# Patient Record
Sex: MALE | Race: WHITE | NOT HISPANIC OR LATINO | Employment: OTHER | URBAN - METROPOLITAN AREA
[De-identification: names, ages, dates, MRNs, and addresses within clinical notes are randomized per-mention and may not be internally consistent; named-entity substitution may affect disease eponyms.]

---

## 2024-11-19 ENCOUNTER — APPOINTMENT (OUTPATIENT)
Dept: RADIOLOGY | Facility: HOSPITAL | Age: 69
DRG: 177 | End: 2024-11-19
Payer: MEDICARE

## 2024-11-19 ENCOUNTER — APPOINTMENT (OUTPATIENT)
Dept: CARDIOLOGY | Facility: HOSPITAL | Age: 69
DRG: 177 | End: 2024-11-19
Payer: MEDICARE

## 2024-11-19 ENCOUNTER — HOSPITAL ENCOUNTER (INPATIENT)
Facility: HOSPITAL | Age: 69
DRG: 177 | End: 2024-11-19
Attending: EMERGENCY MEDICINE | Admitting: STUDENT IN AN ORGANIZED HEALTH CARE EDUCATION/TRAINING PROGRAM
Payer: MEDICARE

## 2024-11-19 DIAGNOSIS — I21.4 NSTEMI (NON-ST ELEVATED MYOCARDIAL INFARCTION) (MULTI): ICD-10-CM

## 2024-11-19 DIAGNOSIS — R41.82 ALTERED MENTAL STATUS, UNSPECIFIED ALTERED MENTAL STATUS TYPE: ICD-10-CM

## 2024-11-19 DIAGNOSIS — I48.91 ATRIAL FIBRILLATION WITH RVR (MULTI): Primary | ICD-10-CM

## 2024-11-19 DIAGNOSIS — J18.9 PNEUMONIA OF BOTH LUNGS DUE TO INFECTIOUS ORGANISM, UNSPECIFIED PART OF LUNG: ICD-10-CM

## 2024-11-19 DIAGNOSIS — F10.939 ALCOHOL WITHDRAWAL SYNDROME WITH COMPLICATION (MULTI): ICD-10-CM

## 2024-11-19 LAB
ALBUMIN SERPL BCP-MCNC: 4.4 G/DL (ref 3.4–5)
ALP SERPL-CCNC: 88 U/L (ref 33–136)
ALT SERPL W P-5'-P-CCNC: 5 U/L (ref 10–52)
AMMONIA PLAS-SCNC: 24 UMOL/L (ref 16–53)
AMPHETAMINES UR QL SCN: ABNORMAL
ANION GAP SERPL CALC-SCNC: 23 MMOL/L (ref 10–20)
APPEARANCE UR: CLEAR
AST SERPL W P-5'-P-CCNC: 19 U/L (ref 9–39)
ATRIAL RATE: 150 BPM
ATRIAL RATE: 58 BPM
ATRIAL RATE: 91 BPM
BARBITURATES UR QL SCN: ABNORMAL
BASOPHILS # BLD AUTO: 0.07 X10*3/UL (ref 0–0.1)
BASOPHILS NFR BLD AUTO: 0.7 %
BENZODIAZ UR QL SCN: ABNORMAL
BILIRUB SERPL-MCNC: 1.2 MG/DL (ref 0–1.2)
BILIRUB UR STRIP.AUTO-MCNC: NEGATIVE MG/DL
BNP SERPL-MCNC: 278 PG/ML (ref 0–99)
BUN SERPL-MCNC: 15 MG/DL (ref 6–23)
BZE UR QL SCN: ABNORMAL
CALCIUM SERPL-MCNC: 9.9 MG/DL (ref 8.6–10.3)
CANNABINOIDS UR QL SCN: ABNORMAL
CARDIAC TROPONIN I PNL SERPL HS: 10 NG/L (ref 0–20)
CHLORIDE SERPL-SCNC: 92 MMOL/L (ref 98–107)
CK SERPL-CCNC: 40 U/L (ref 0–325)
CO2 SERPL-SCNC: 21 MMOL/L (ref 21–32)
COLOR UR: YELLOW
CREAT SERPL-MCNC: 1.19 MG/DL (ref 0.5–1.3)
EGFRCR SERPLBLD CKD-EPI 2021: 65 ML/MIN/1.73M*2
EOSINOPHIL # BLD AUTO: 0.4 X10*3/UL (ref 0–0.4)
EOSINOPHIL NFR BLD AUTO: 4 %
ERYTHROCYTE [DISTWIDTH] IN BLOOD BY AUTOMATED COUNT: 14.2 % (ref 11.5–14.5)
ETHANOL SERPL-MCNC: <10 MG/DL
FENTANYL+NORFENTANYL UR QL SCN: ABNORMAL
GLUCOSE BLD MANUAL STRIP-MCNC: 100 MG/DL (ref 74–99)
GLUCOSE SERPL-MCNC: 71 MG/DL (ref 74–99)
GLUCOSE UR STRIP.AUTO-MCNC: NORMAL MG/DL
HCT VFR BLD AUTO: 53.5 % (ref 41–52)
HGB BLD-MCNC: 17.5 G/DL (ref 13.5–17.5)
HOLD SPECIMEN: NORMAL
HYALINE CASTS #/AREA URNS AUTO: ABNORMAL /LPF
IMM GRANULOCYTES # BLD AUTO: 0.07 X10*3/UL (ref 0–0.5)
IMM GRANULOCYTES NFR BLD AUTO: 0.7 % (ref 0–0.9)
KETONES UR STRIP.AUTO-MCNC: ABNORMAL MG/DL
LACTATE SERPL-SCNC: 1.5 MMOL/L (ref 0.4–2)
LEUKOCYTE ESTERASE UR QL STRIP.AUTO: NEGATIVE
LIPASE SERPL-CCNC: 16 U/L (ref 9–82)
LYMPHOCYTES # BLD AUTO: 1.5 X10*3/UL (ref 0.8–3)
LYMPHOCYTES NFR BLD AUTO: 15 %
MCH RBC QN AUTO: 31.7 PG (ref 26–34)
MCHC RBC AUTO-ENTMCNC: 32.7 G/DL (ref 32–36)
MCV RBC AUTO: 97 FL (ref 80–100)
METHADONE UR QL SCN: ABNORMAL
MONOCYTES # BLD AUTO: 0.85 X10*3/UL (ref 0.05–0.8)
MONOCYTES NFR BLD AUTO: 8.5 %
NEUTROPHILS # BLD AUTO: 7.14 X10*3/UL (ref 1.6–5.5)
NEUTROPHILS NFR BLD AUTO: 71.1 %
NITRITE UR QL STRIP.AUTO: NEGATIVE
NRBC BLD-RTO: 0 /100 WBCS (ref 0–0)
OPIATES UR QL SCN: ABNORMAL
OXYCODONE+OXYMORPHONE UR QL SCN: ABNORMAL
PCP UR QL SCN: ABNORMAL
PH UR STRIP.AUTO: 5.5 [PH]
PLATELET # BLD AUTO: 377 X10*3/UL (ref 150–450)
POTASSIUM SERPL-SCNC: 3.9 MMOL/L (ref 3.5–5.3)
PROT SERPL-MCNC: 9 G/DL (ref 6.4–8.2)
PROT UR STRIP.AUTO-MCNC: ABNORMAL MG/DL
Q ONSET: 228 MS
QRS COUNT: 15 BEATS
QRS COUNT: 22 BEATS
QRS COUNT: 22 BEATS
QRS DURATION: 70 MS
QRS DURATION: 72 MS
QRS DURATION: 74 MS
QT INTERVAL: 316 MS
QT INTERVAL: 324 MS
QT INTERVAL: 374 MS
QTC CALCULATION(BAZETT): 469 MS
QTC CALCULATION(BAZETT): 471 MS
QTC CALCULATION(BAZETT): 490 MS
QTC FREDERICIA: 412 MS
QTC FREDERICIA: 427 MS
QTC FREDERICIA: 435 MS
R AXIS: 87 DEGREES
R AXIS: 90 DEGREES
R AXIS: 95 DEGREES
RBC # BLD AUTO: 5.52 X10*6/UL (ref 4.5–5.9)
RBC # UR STRIP.AUTO: ABNORMAL /UL
RBC #/AREA URNS AUTO: ABNORMAL /HPF
SODIUM SERPL-SCNC: 132 MMOL/L (ref 136–145)
SP GR UR STRIP.AUTO: 1.02
T AXIS: 240 DEGREES
T AXIS: 243 DEGREES
T AXIS: 269 DEGREES
T OFFSET: 386 MS
T OFFSET: 390 MS
T OFFSET: 415 MS
UROBILINOGEN UR STRIP.AUTO-MCNC: NORMAL MG/DL
VENTRICULAR RATE: 134 BPM
VENTRICULAR RATE: 138 BPM
VENTRICULAR RATE: 95 BPM
WBC # BLD AUTO: 10 X10*3/UL (ref 4.4–11.3)
WBC #/AREA URNS AUTO: ABNORMAL /HPF

## 2024-11-19 PROCEDURE — 93005 ELECTROCARDIOGRAM TRACING: CPT

## 2024-11-19 PROCEDURE — 86140 C-REACTIVE PROTEIN: CPT | Performed by: STUDENT IN AN ORGANIZED HEALTH CARE EDUCATION/TRAINING PROGRAM

## 2024-11-19 PROCEDURE — 2060000001 HC INTERMEDIATE ICU ROOM DAILY

## 2024-11-19 PROCEDURE — 96361 HYDRATE IV INFUSION ADD-ON: CPT

## 2024-11-19 PROCEDURE — 96375 TX/PRO/DX INJ NEW DRUG ADDON: CPT

## 2024-11-19 PROCEDURE — 80053 COMPREHEN METABOLIC PANEL: CPT | Performed by: EMERGENCY MEDICINE

## 2024-11-19 PROCEDURE — 71045 X-RAY EXAM CHEST 1 VIEW: CPT

## 2024-11-19 PROCEDURE — 85025 COMPLETE CBC W/AUTO DIFF WBC: CPT | Performed by: EMERGENCY MEDICINE

## 2024-11-19 PROCEDURE — 96365 THER/PROPH/DIAG IV INF INIT: CPT | Mod: 59

## 2024-11-19 PROCEDURE — 2500000004 HC RX 250 GENERAL PHARMACY W/ HCPCS (ALT 636 FOR OP/ED): Performed by: STUDENT IN AN ORGANIZED HEALTH CARE EDUCATION/TRAINING PROGRAM

## 2024-11-19 PROCEDURE — 82550 ASSAY OF CK (CPK): CPT | Performed by: EMERGENCY MEDICINE

## 2024-11-19 PROCEDURE — 96372 THER/PROPH/DIAG INJ SC/IM: CPT

## 2024-11-19 PROCEDURE — 87899 AGENT NOS ASSAY W/OPTIC: CPT | Mod: ELYLAB | Performed by: STUDENT IN AN ORGANIZED HEALTH CARE EDUCATION/TRAINING PROGRAM

## 2024-11-19 PROCEDURE — 84484 ASSAY OF TROPONIN QUANT: CPT | Performed by: EMERGENCY MEDICINE

## 2024-11-19 PROCEDURE — 71045 X-RAY EXAM CHEST 1 VIEW: CPT | Performed by: RADIOLOGY

## 2024-11-19 PROCEDURE — 2500000004 HC RX 250 GENERAL PHARMACY W/ HCPCS (ALT 636 FOR OP/ED): Performed by: EMERGENCY MEDICINE

## 2024-11-19 PROCEDURE — 70450 CT HEAD/BRAIN W/O DYE: CPT

## 2024-11-19 PROCEDURE — 99291 CRITICAL CARE FIRST HOUR: CPT | Performed by: EMERGENCY MEDICINE

## 2024-11-19 PROCEDURE — 87449 NOS EACH ORGANISM AG IA: CPT | Mod: ELYLAB | Performed by: STUDENT IN AN ORGANIZED HEALTH CARE EDUCATION/TRAINING PROGRAM

## 2024-11-19 PROCEDURE — 81001 URINALYSIS AUTO W/SCOPE: CPT | Performed by: EMERGENCY MEDICINE

## 2024-11-19 PROCEDURE — 82077 ASSAY SPEC XCP UR&BREATH IA: CPT | Performed by: EMERGENCY MEDICINE

## 2024-11-19 PROCEDURE — 83690 ASSAY OF LIPASE: CPT | Performed by: EMERGENCY MEDICINE

## 2024-11-19 PROCEDURE — 82947 ASSAY GLUCOSE BLOOD QUANT: CPT

## 2024-11-19 PROCEDURE — 87040 BLOOD CULTURE FOR BACTERIA: CPT | Mod: ELYLAB | Performed by: EMERGENCY MEDICINE

## 2024-11-19 PROCEDURE — 2500000001 HC RX 250 WO HCPCS SELF ADMINISTERED DRUGS (ALT 637 FOR MEDICARE OP): Performed by: STUDENT IN AN ORGANIZED HEALTH CARE EDUCATION/TRAINING PROGRAM

## 2024-11-19 PROCEDURE — 83880 ASSAY OF NATRIURETIC PEPTIDE: CPT | Performed by: EMERGENCY MEDICINE

## 2024-11-19 PROCEDURE — 70450 CT HEAD/BRAIN W/O DYE: CPT | Performed by: RADIOLOGY

## 2024-11-19 PROCEDURE — 36415 COLL VENOUS BLD VENIPUNCTURE: CPT | Performed by: EMERGENCY MEDICINE

## 2024-11-19 PROCEDURE — 83605 ASSAY OF LACTIC ACID: CPT | Performed by: EMERGENCY MEDICINE

## 2024-11-19 PROCEDURE — 2500000004 HC RX 250 GENERAL PHARMACY W/ HCPCS (ALT 636 FOR OP/ED)

## 2024-11-19 PROCEDURE — 99223 1ST HOSP IP/OBS HIGH 75: CPT | Performed by: STUDENT IN AN ORGANIZED HEALTH CARE EDUCATION/TRAINING PROGRAM

## 2024-11-19 PROCEDURE — 82140 ASSAY OF AMMONIA: CPT | Performed by: EMERGENCY MEDICINE

## 2024-11-19 PROCEDURE — 80307 DRUG TEST PRSMV CHEM ANLYZR: CPT | Performed by: STUDENT IN AN ORGANIZED HEALTH CARE EDUCATION/TRAINING PROGRAM

## 2024-11-19 RX ORDER — OLANZAPINE 10 MG/2ML
5 INJECTION, POWDER, FOR SOLUTION INTRAMUSCULAR ONCE AS NEEDED
Status: COMPLETED | OUTPATIENT
Start: 2024-11-19 | End: 2024-11-20

## 2024-11-19 RX ORDER — LANOLIN ALCOHOL/MO/W.PET/CERES
100 CREAM (GRAM) TOPICAL DAILY
Status: DISCONTINUED | OUTPATIENT
Start: 2024-11-22 | End: 2024-11-20

## 2024-11-19 RX ORDER — LORAZEPAM 2 MG/ML
INJECTION INTRAMUSCULAR
Status: DISPENSED
Start: 2024-11-19 | End: 2024-11-19

## 2024-11-19 RX ORDER — METOPROLOL TARTRATE 25 MG/1
12.5 TABLET, FILM COATED ORAL
COMMUNITY
Start: 2024-09-03 | End: 2024-11-25 | Stop reason: HOSPADM

## 2024-11-19 RX ORDER — GABAPENTIN 400 MG/1
400 CAPSULE ORAL
COMMUNITY
Start: 2024-02-14

## 2024-11-19 RX ORDER — ACETAMINOPHEN 650 MG/1
650 SUPPOSITORY RECTAL EVERY 4 HOURS PRN
Status: DISCONTINUED | OUTPATIENT
Start: 2024-11-19 | End: 2024-11-25 | Stop reason: HOSPADM

## 2024-11-19 RX ORDER — POLYETHYLENE GLYCOL 3350 17 G/17G
17 POWDER, FOR SOLUTION ORAL DAILY PRN
Status: DISCONTINUED | OUTPATIENT
Start: 2024-11-19 | End: 2024-11-22

## 2024-11-19 RX ORDER — LORAZEPAM 2 MG/ML
1 INJECTION INTRAMUSCULAR EVERY 2 HOUR PRN
Status: DISCONTINUED | OUTPATIENT
Start: 2024-11-19 | End: 2024-11-20

## 2024-11-19 RX ORDER — LORAZEPAM 2 MG/ML
2 INJECTION INTRAMUSCULAR EVERY 2 HOUR PRN
Status: DISCONTINUED | OUTPATIENT
Start: 2024-11-19 | End: 2024-11-20

## 2024-11-19 RX ORDER — METOPROLOL TARTRATE 1 MG/ML
5 INJECTION, SOLUTION INTRAVENOUS EVERY 6 HOURS
Status: DISCONTINUED | OUTPATIENT
Start: 2024-11-19 | End: 2024-11-20

## 2024-11-19 RX ORDER — CEFTRIAXONE 1 G/50ML
1 INJECTION, SOLUTION INTRAVENOUS EVERY 24 HOURS
Status: DISCONTINUED | OUTPATIENT
Start: 2024-11-19 | End: 2024-11-21

## 2024-11-19 RX ORDER — ENOXAPARIN SODIUM 100 MG/ML
1 INJECTION SUBCUTANEOUS EVERY 12 HOURS
Status: DISCONTINUED | OUTPATIENT
Start: 2024-11-19 | End: 2024-11-20

## 2024-11-19 RX ORDER — MULTIVIT-MIN/IRON FUM/FOLIC AC 7.5 MG-4
1 TABLET ORAL DAILY
Status: DISCONTINUED | OUTPATIENT
Start: 2024-11-19 | End: 2024-11-20

## 2024-11-19 RX ORDER — ONDANSETRON HYDROCHLORIDE 2 MG/ML
4 INJECTION, SOLUTION INTRAVENOUS ONCE
Status: COMPLETED | OUTPATIENT
Start: 2024-11-19 | End: 2024-11-19

## 2024-11-19 RX ORDER — FOLIC ACID 1 MG/1
1 TABLET ORAL DAILY
Status: DISCONTINUED | OUTPATIENT
Start: 2024-11-19 | End: 2024-11-20

## 2024-11-19 RX ORDER — THIAMINE HYDROCHLORIDE 100 MG/ML
100 INJECTION, SOLUTION INTRAMUSCULAR; INTRAVENOUS DAILY
Status: DISCONTINUED | OUTPATIENT
Start: 2024-11-19 | End: 2024-11-20

## 2024-11-19 RX ORDER — DABIGATRAN ETEXILATE 150 MG/1
150 CAPSULE ORAL
COMMUNITY
Start: 2024-06-26

## 2024-11-19 RX ORDER — HALOPERIDOL 5 MG/ML
5 INJECTION INTRAMUSCULAR ONCE
Status: COMPLETED | OUTPATIENT
Start: 2024-11-19 | End: 2024-11-19

## 2024-11-19 RX ORDER — METOPROLOL TARTRATE 50 MG/1
50 TABLET ORAL 2 TIMES DAILY
Status: DISCONTINUED | OUTPATIENT
Start: 2024-11-19 | End: 2024-11-23

## 2024-11-19 RX ORDER — ACETAMINOPHEN 325 MG/1
650 TABLET ORAL EVERY 4 HOURS PRN
Status: DISCONTINUED | OUTPATIENT
Start: 2024-11-19 | End: 2024-11-25 | Stop reason: HOSPADM

## 2024-11-19 RX ORDER — LORAZEPAM 2 MG/ML
1 INJECTION INTRAMUSCULAR ONCE
Status: COMPLETED | OUTPATIENT
Start: 2024-11-19 | End: 2024-11-19

## 2024-11-19 RX ORDER — CYCLOBENZAPRINE HCL 10 MG
10 TABLET ORAL 3 TIMES DAILY PRN
COMMUNITY
Start: 2024-06-21

## 2024-11-19 RX ORDER — LORAZEPAM 2 MG/ML
0.5 INJECTION INTRAMUSCULAR EVERY 2 HOUR PRN
Status: DISCONTINUED | OUTPATIENT
Start: 2024-11-19 | End: 2024-11-20

## 2024-11-19 RX ORDER — LORAZEPAM 2 MG/ML
1 INJECTION INTRAMUSCULAR ONCE
Status: DISCONTINUED | OUTPATIENT
Start: 2024-11-19 | End: 2024-11-19

## 2024-11-19 RX ORDER — HALOPERIDOL 5 MG/ML
INJECTION INTRAMUSCULAR
Status: COMPLETED
Start: 2024-11-19 | End: 2024-11-19

## 2024-11-19 RX ORDER — DABIGATRAN ETEXILATE 150 MG/1
150 CAPSULE ORAL 2 TIMES DAILY
Status: DISCONTINUED | OUTPATIENT
Start: 2024-11-19 | End: 2024-11-25 | Stop reason: HOSPADM

## 2024-11-19 RX ORDER — ACETAMINOPHEN 160 MG/5ML
650 SOLUTION ORAL EVERY 4 HOURS PRN
Status: DISCONTINUED | OUTPATIENT
Start: 2024-11-19 | End: 2024-11-25 | Stop reason: HOSPADM

## 2024-11-19 RX ADMIN — DEXTROSE MONOHYDRATE 10 MG/HR: 50 INJECTION, SOLUTION INTRAVENOUS at 06:45

## 2024-11-19 RX ADMIN — SODIUM CHLORIDE 1000 ML: 9 INJECTION, SOLUTION INTRAVENOUS at 08:07

## 2024-11-19 RX ADMIN — LORAZEPAM 2 MG: 2 INJECTION INTRAMUSCULAR; INTRAVENOUS at 19:51

## 2024-11-19 RX ADMIN — METOPROLOL TARTRATE 5 MG: 5 INJECTION INTRAVENOUS at 11:27

## 2024-11-19 RX ADMIN — METOPROLOL TARTRATE 5 MG: 5 INJECTION INTRAVENOUS at 17:31

## 2024-11-19 RX ADMIN — CEFTRIAXONE SODIUM 1 G: 1 INJECTION, SOLUTION INTRAVENOUS at 20:43

## 2024-11-19 RX ADMIN — HALOPERIDOL LACTATE 5 MG: 5 INJECTION, SOLUTION INTRAMUSCULAR at 06:09

## 2024-11-19 RX ADMIN — PIPERACILLIN SODIUM AND TAZOBACTAM SODIUM 3.38 G: 3; .375 INJECTION, SOLUTION INTRAVENOUS at 08:01

## 2024-11-19 RX ADMIN — LORAZEPAM 2 MG: 2 INJECTION INTRAMUSCULAR; INTRAVENOUS at 13:27

## 2024-11-19 RX ADMIN — AZITHROMYCIN MONOHYDRATE 500 MG: 500 INJECTION, POWDER, LYOPHILIZED, FOR SOLUTION INTRAVENOUS at 18:51

## 2024-11-19 RX ADMIN — ONDANSETRON 4 MG: 2 INJECTION INTRAMUSCULAR; INTRAVENOUS at 03:57

## 2024-11-19 RX ADMIN — HALOPERIDOL 5 MG: 5 INJECTION INTRAMUSCULAR at 06:09

## 2024-11-19 RX ADMIN — LORAZEPAM 2 MG: 2 INJECTION INTRAMUSCULAR; INTRAVENOUS at 15:13

## 2024-11-19 RX ADMIN — ENOXAPARIN SODIUM 50 MG: 60 INJECTION SUBCUTANEOUS at 20:44

## 2024-11-19 RX ADMIN — LORAZEPAM 1 MG: 2 INJECTION INTRAMUSCULAR; INTRAVENOUS at 04:50

## 2024-11-19 RX ADMIN — LORAZEPAM 2 MG: 2 INJECTION INTRAMUSCULAR; INTRAVENOUS at 22:29

## 2024-11-19 RX ADMIN — LORAZEPAM 2 MG: 2 INJECTION INTRAMUSCULAR; INTRAVENOUS at 17:30

## 2024-11-19 RX ADMIN — SODIUM CHLORIDE 500 ML: 9 INJECTION, SOLUTION INTRAVENOUS at 03:18

## 2024-11-19 RX ADMIN — METOPROLOL TARTRATE 5 MG: 5 INJECTION INTRAVENOUS at 22:32

## 2024-11-19 SDOH — SOCIAL STABILITY: SOCIAL INSECURITY: DO YOU FEEL UNSAFE GOING BACK TO THE PLACE WHERE YOU ARE LIVING?: UNABLE TO ASSESS

## 2024-11-19 SDOH — SOCIAL STABILITY: SOCIAL INSECURITY: ARE THERE ANY APPARENT SIGNS OF INJURIES/BEHAVIORS THAT COULD BE RELATED TO ABUSE/NEGLECT?: UNABLE TO ASSESS

## 2024-11-19 SDOH — ECONOMIC STABILITY: HOUSING INSECURITY: AT ANY TIME IN THE PAST 12 MONTHS, WERE YOU HOMELESS OR LIVING IN A SHELTER (INCLUDING NOW)?: NO

## 2024-11-19 SDOH — ECONOMIC STABILITY: HOUSING INSECURITY
IN THE LAST 12 MONTHS, WAS THERE A TIME WHEN YOU WERE NOT ABLE TO PAY THE MORTGAGE OR RENT ON TIME?: PATIENT UNABLE TO ANSWER

## 2024-11-19 SDOH — HEALTH STABILITY: MENTAL HEALTH: HOW OFTEN DO YOU HAVE SIX OR MORE DRINKS ON ONE OCCASION?: PATIENT UNABLE TO ANSWER

## 2024-11-19 SDOH — HEALTH STABILITY: PHYSICAL HEALTH
HOW OFTEN DO YOU NEED TO HAVE SOMEONE HELP YOU WHEN YOU READ INSTRUCTIONS, PAMPHLETS, OR OTHER WRITTEN MATERIAL FROM YOUR DOCTOR OR PHARMACY?: PATIENT DECLINES TO RESPOND

## 2024-11-19 SDOH — SOCIAL STABILITY: SOCIAL INSECURITY
WITHIN THE LAST YEAR, HAVE YOU BEEN RAPED OR FORCED TO HAVE ANY KIND OF SEXUAL ACTIVITY BY YOUR PARTNER OR EX-PARTNER?: PATIENT UNABLE TO ANSWER

## 2024-11-19 SDOH — HEALTH STABILITY: MENTAL HEALTH
DO YOU FEEL STRESS - TENSE, RESTLESS, NERVOUS, OR ANXIOUS, OR UNABLE TO SLEEP AT NIGHT BECAUSE YOUR MIND IS TROUBLED ALL THE TIME - THESE DAYS?: PATIENT UNABLE TO ANSWER

## 2024-11-19 SDOH — SOCIAL STABILITY: SOCIAL INSECURITY: ARE YOU OR HAVE YOU BEEN THREATENED OR ABUSED PHYSICALLY, EMOTIONALLY, OR SEXUALLY BY ANYONE?: UNABLE TO ASSESS

## 2024-11-19 SDOH — SOCIAL STABILITY: SOCIAL INSECURITY: ABUSE: ADULT

## 2024-11-19 SDOH — ECONOMIC STABILITY: FOOD INSECURITY
HOW HARD IS IT FOR YOU TO PAY FOR THE VERY BASICS LIKE FOOD, HOUSING, MEDICAL CARE, AND HEATING?: PATIENT UNABLE TO ANSWER

## 2024-11-19 SDOH — ECONOMIC STABILITY: INCOME INSECURITY
IN THE PAST 12 MONTHS HAS THE ELECTRIC, GAS, OIL, OR WATER COMPANY THREATENED TO SHUT OFF SERVICES IN YOUR HOME?: PATIENT UNABLE TO ANSWER

## 2024-11-19 SDOH — SOCIAL STABILITY: SOCIAL INSECURITY: HAVE YOU HAD ANY THOUGHTS OF HARMING ANYONE ELSE?: UNABLE TO ASSESS

## 2024-11-19 SDOH — SOCIAL STABILITY: SOCIAL INSECURITY: ARE YOU MARRIED, WIDOWED, DIVORCED, SEPARATED, NEVER MARRIED, OR LIVING WITH A PARTNER?: PATIENT UNABLE TO ANSWER

## 2024-11-19 SDOH — SOCIAL STABILITY: SOCIAL INSECURITY
WITHIN THE LAST YEAR, HAVE YOU BEEN HUMILIATED OR EMOTIONALLY ABUSED IN OTHER WAYS BY YOUR PARTNER OR EX-PARTNER?: PATIENT UNABLE TO ANSWER

## 2024-11-19 SDOH — HEALTH STABILITY: MENTAL HEALTH: HOW MANY DRINKS CONTAINING ALCOHOL DO YOU HAVE ON A TYPICAL DAY WHEN YOU ARE DRINKING?: PATIENT UNABLE TO ANSWER

## 2024-11-19 SDOH — SOCIAL STABILITY: SOCIAL NETWORK: HOW OFTEN DO YOU GET TOGETHER WITH FRIENDS OR RELATIVES?: PATIENT UNABLE TO ANSWER

## 2024-11-19 SDOH — HEALTH STABILITY: PHYSICAL HEALTH
ON AVERAGE, HOW MANY DAYS PER WEEK DO YOU ENGAGE IN MODERATE TO STRENUOUS EXERCISE (LIKE A BRISK WALK)?: PATIENT UNABLE TO ANSWER

## 2024-11-19 SDOH — SOCIAL STABILITY: SOCIAL INSECURITY: DOES ANYONE TRY TO KEEP YOU FROM HAVING/CONTACTING OTHER FRIENDS OR DOING THINGS OUTSIDE YOUR HOME?: UNABLE TO ASSESS

## 2024-11-19 SDOH — SOCIAL STABILITY: SOCIAL NETWORK: HOW OFTEN DO YOU ATTEND CHURCH OR RELIGIOUS SERVICES?: PATIENT UNABLE TO ANSWER

## 2024-11-19 SDOH — SOCIAL STABILITY: SOCIAL NETWORK
DO YOU BELONG TO ANY CLUBS OR ORGANIZATIONS SUCH AS CHURCH GROUPS, UNIONS, FRATERNAL OR ATHLETIC GROUPS, OR SCHOOL GROUPS?: PATIENT UNABLE TO ANSWER

## 2024-11-19 SDOH — SOCIAL STABILITY: SOCIAL INSECURITY: HAVE YOU HAD THOUGHTS OF HARMING ANYONE ELSE?: UNABLE TO ASSESS

## 2024-11-19 SDOH — SOCIAL STABILITY: SOCIAL INSECURITY
WITHIN THE LAST YEAR, HAVE YOU BEEN KICKED, HIT, SLAPPED, OR OTHERWISE PHYSICALLY HURT BY YOUR PARTNER OR EX-PARTNER?: PATIENT UNABLE TO ANSWER

## 2024-11-19 SDOH — ECONOMIC STABILITY: FOOD INSECURITY
WITHIN THE PAST 12 MONTHS, THE FOOD YOU BOUGHT JUST DIDN'T LAST AND YOU DIDN'T HAVE MONEY TO GET MORE.: PATIENT UNABLE TO ANSWER

## 2024-11-19 SDOH — HEALTH STABILITY: MENTAL HEALTH: HOW OFTEN DO YOU HAVE A DRINK CONTAINING ALCOHOL?: PATIENT UNABLE TO ANSWER

## 2024-11-19 SDOH — SOCIAL STABILITY: SOCIAL INSECURITY: WITHIN THE LAST YEAR, HAVE YOU BEEN AFRAID OF YOUR PARTNER OR EX-PARTNER?: PATIENT UNABLE TO ANSWER

## 2024-11-19 SDOH — ECONOMIC STABILITY: TRANSPORTATION INSECURITY: IN THE PAST 12 MONTHS, HAS LACK OF TRANSPORTATION KEPT YOU FROM MEDICAL APPOINTMENTS OR FROM GETTING MEDICATIONS?: NO

## 2024-11-19 SDOH — SOCIAL STABILITY: SOCIAL INSECURITY: DO YOU FEEL ANYONE HAS EXPLOITED OR TAKEN ADVANTAGE OF YOU FINANCIALLY OR OF YOUR PERSONAL PROPERTY?: UNABLE TO ASSESS

## 2024-11-19 SDOH — HEALTH STABILITY: PHYSICAL HEALTH: ON AVERAGE, HOW MANY MINUTES DO YOU ENGAGE IN EXERCISE AT THIS LEVEL?: PATIENT UNABLE TO ANSWER

## 2024-11-19 SDOH — ECONOMIC STABILITY: FOOD INSECURITY
WITHIN THE PAST 12 MONTHS, YOU WORRIED THAT YOUR FOOD WOULD RUN OUT BEFORE YOU GOT THE MONEY TO BUY MORE.: PATIENT UNABLE TO ANSWER

## 2024-11-19 SDOH — SOCIAL STABILITY: SOCIAL NETWORK: IN A TYPICAL WEEK, HOW MANY TIMES DO YOU TALK ON THE PHONE WITH FAMILY, FRIENDS, OR NEIGHBORS?: PATIENT UNABLE TO ANSWER

## 2024-11-19 SDOH — SOCIAL STABILITY: SOCIAL NETWORK: HOW OFTEN DO YOU ATTEND MEETINGS OF THE CLUBS OR ORGANIZATIONS YOU BELONG TO?: PATIENT UNABLE TO ANSWER

## 2024-11-19 SDOH — ECONOMIC STABILITY: HOUSING INSECURITY: IN THE PAST 12 MONTHS, HOW MANY TIMES HAVE YOU MOVED WHERE YOU WERE LIVING?: 0

## 2024-11-19 SDOH — SOCIAL STABILITY: SOCIAL INSECURITY: HAS ANYONE EVER THREATENED TO HURT YOUR FAMILY OR YOUR PETS?: UNABLE TO ASSESS

## 2024-11-19 ASSESSMENT — LIFESTYLE VARIABLES
AGITATION: 5
PAROXYSMAL SWEATS: BARELY PERCEPTIBLE SWEATING, PALMS MOIST
ORIENTATION AND CLOUDING OF SENSORIUM: DISORIENTED FOR PLACE OR PERSON
AGITATION: MODERATELY FIDGETY AND RESTLESS
PAROXYSMAL SWEATS: BARELY PERCEPTIBLE SWEATING, PALMS MOIST
HAVE YOU OR SOMEONE ELSE BEEN INJURED AS A RESULT OF YOUR DRINKING: NO
AUDITORY DISTURBANCES: NOT PRESENT
AUDIT TOTAL SCORE: 0
VISUAL DISTURBANCES: NOT PRESENT
AUDIT-C TOTAL SCORE: -1
ORIENTATION AND CLOUDING OF SENSORIUM: DISORIENTED FOR PLACE OR PERSON
AUDITORY DISTURBANCES: NOT PRESENT
HOW OFTEN DURING THE LAST YEAR HAVE YOU HAD A FEELING OF GUILT OR REMORSE AFTER DRINKING: NEVER
HOW OFTEN DO YOU HAVE 6 OR MORE DRINKS ON ONE OCCASION: NEVER
HEADACHE, FULLNESS IN HEAD: NOT PRESENT
AUDIT-C TOTAL SCORE: 5
ORIENTATION AND CLOUDING OF SENSORIUM: DISORIENTED FOR PLACE OR PERSON
SKIP TO QUESTIONS 9-10: 0
HEADACHE, FULLNESS IN HEAD: NOT PRESENT
AUDITORY DISTURBANCES: NOT PRESENT
NAUSEA AND VOMITING: NO NAUSEA AND NO VOMITING
VISUAL DISTURBANCES: NOT PRESENT
TREMOR: NOT VISIBLE, BUT CAN BE FELT FINGERTIP TO FINGERTIP
HEADACHE, FULLNESS IN HEAD: NOT PRESENT
NAUSEA AND VOMITING: NO NAUSEA AND NO VOMITING
AGITATION: PACES BACK AND FORTH DURING MOST OF THE INTERVIEW, OR CONSTANTLY THRASHES ABOUT
AUDITORY DISTURBANCES: NOT PRESENT
AUDITORY DISTURBANCES: NOT PRESENT
ORIENTATION AND CLOUDING OF SENSORIUM: DISORIENTED FOR PLACE OR PERSON
SKIP TO QUESTIONS 9-10: 0
ORIENTATION AND CLOUDING OF SENSORIUM: DISORIENTED FOR PLACE OR PERSON
PAROXYSMAL SWEATS: BARELY PERCEPTIBLE SWEATING, PALMS MOIST
ORIENTATION AND CLOUDING OF SENSORIUM: DISORIENTED FOR PLACE OR PERSON
NAUSEA AND VOMITING: NO NAUSEA AND NO VOMITING
PAROXYSMAL SWEATS: BARELY PERCEPTIBLE SWEATING, PALMS MOIST
ANXIETY: MODERATELY ANXIOUS, OR GUARDED, SO ANXIETY IS INFERRED
PAROXYSMAL SWEATS: BARELY PERCEPTIBLE SWEATING, PALMS MOIST
NAUSEA AND VOMITING: NO NAUSEA AND NO VOMITING
NAUSEA AND VOMITING: NO NAUSEA AND NO VOMITING
SUBSTANCE_ABUSE_PAST_12_MONTHS: YES
ANXIETY: MODERATELY ANXIOUS, OR GUARDED, SO ANXIETY IS INFERRED
PULSE: 117
AGITATION: PACES BACK AND FORTH DURING MOST OF THE INTERVIEW, OR CONSTANTLY THRASHES ABOUT
AUDITORY DISTURBANCES: NOT PRESENT
HAS A RELATIVE, FRIEND, DOCTOR, OR ANOTHER HEALTH PROFESSIONAL EXPRESSED CONCERN ABOUT YOUR DRINKING OR SUGGESTED YOU CUT DOWN: NO
TOTAL SCORE: 19
VISUAL DISTURBANCES: NOT PRESENT
VISUAL DISTURBANCES: NOT PRESENT
BLOOD PRESSURE: 138/99
HOW OFTEN DURING THE LAST YEAR HAVE YOU FOUND THAT YOU WERE NOT ABLE TO STOP DRINKING ONCE YOU HAD STARTED: NEVER
TREMOR: NOT VISIBLE, BUT CAN BE FELT FINGERTIP TO FINGERTIP
BLOOD PRESSURE: 141/75
PAROXYSMAL SWEATS: BARELY PERCEPTIBLE SWEATING, PALMS MOIST
TREMOR: NOT VISIBLE, BUT CAN BE FELT FINGERTIP TO FINGERTIP
ORIENTATION AND CLOUDING OF SENSORIUM: DISORIENTED FOR PLACE OR PERSON
ANXIETY: 6
ORIENTATION AND CLOUDING OF SENSORIUM: DISORIENTED FOR PLACE OR PERSON
AGITATION: PACES BACK AND FORTH DURING MOST OF THE INTERVIEW, OR CONSTANTLY THRASHES ABOUT
HOW OFTEN DURING THE LAST YEAR HAVE YOU BEEN UNABLE TO REMEMBER WHAT HAPPENED THE NIGHT BEFORE BECAUSE YOU HAD BEEN DRINKING: NEVER
HEADACHE, FULLNESS IN HEAD: NOT PRESENT
AUDITORY DISTURBANCES: NOT PRESENT
AUDITORY DISTURBANCES: NOT PRESENT
VISUAL DISTURBANCES: NOT PRESENT
VISUAL DISTURBANCES: NOT PRESENT
TOTAL SCORE: 15
NAUSEA AND VOMITING: NO NAUSEA AND NO VOMITING
TOTAL SCORE: 14
ANXIETY: MODERATELY ANXIOUS, OR GUARDED, SO ANXIETY IS INFERRED
VISUAL DISTURBANCES: NOT PRESENT
AGITATION: 3
ORIENTATION AND CLOUDING OF SENSORIUM: DISORIENTED FOR PLACE OR PERSON
PAROXYSMAL SWEATS: BARELY PERCEPTIBLE SWEATING, PALMS MOIST
HEADACHE, FULLNESS IN HEAD: NOT PRESENT
TREMOR: NO TREMOR
HOW OFTEN DURING THE LAST YEAR HAVE YOU FAILED TO DO WHAT WAS NORMALLY EXPECTED FROM YOU BECAUSE OF DRINKING: NEVER
ANXIETY: 6
TREMOR: NOT VISIBLE, BUT CAN BE FELT FINGERTIP TO FINGERTIP
TOTAL SCORE: 13
PULSE: 114
TOTAL SCORE: 18
AGITATION: 5
HEADACHE, FULLNESS IN HEAD: NOT PRESENT
PULSE: 101
PAROXYSMAL SWEATS: BARELY PERCEPTIBLE SWEATING, PALMS MOIST
HOW OFTEN DURING THE LAST YEAR HAVE YOU NEEDED AN ALCOHOLIC DRINK FIRST THING IN THE MORNING TO GET YOURSELF GOING AFTER A NIGHT OF HEAVY DRINKING: NEVER
TOTAL SCORE: 14
TREMOR: NOT VISIBLE, BUT CAN BE FELT FINGERTIP TO FINGERTIP
ANXIETY: 6
NAUSEA AND VOMITING: NO NAUSEA AND NO VOMITING
HOW MANY STANDARD DRINKS CONTAINING ALCOHOL DO YOU HAVE ON A TYPICAL DAY: 3 OR 4
ORIENTATION AND CLOUDING OF SENSORIUM: DISORIENTED FOR PLACE OR PERSON
HEADACHE, FULLNESS IN HEAD: NOT PRESENT
PULSE: 90
ANXIETY: MODERATELY ANXIOUS, OR GUARDED, SO ANXIETY IS INFERRED
ANXIETY: 6
AGITATION: PACES BACK AND FORTH DURING MOST OF THE INTERVIEW, OR CONSTANTLY THRASHES ABOUT
NAUSEA AND VOMITING: NO NAUSEA AND NO VOMITING
TOTAL SCORE: 19
NAUSEA AND VOMITING: NO NAUSEA AND NO VOMITING
VISUAL DISTURBANCES: NOT PRESENT
AUDITORY DISTURBANCES: NOT PRESENT
PAROXYSMAL SWEATS: BARELY PERCEPTIBLE SWEATING, PALMS MOIST
HEADACHE, FULLNESS IN HEAD: NOT PRESENT
TOTAL SCORE: 16
TREMOR: NOT VISIBLE, BUT CAN BE FELT FINGERTIP TO FINGERTIP
TREMOR: NOT VISIBLE, BUT CAN BE FELT FINGERTIP TO FINGERTIP
ANXIETY: 5
TOTAL SCORE: 19
PRESCIPTION_ABUSE_PAST_12_MONTHS: NO
NAUSEA AND VOMITING: NO NAUSEA AND NO VOMITING
AUDITORY DISTURBANCES: NOT PRESENT
PAROXYSMAL SWEATS: BARELY PERCEPTIBLE SWEATING, PALMS MOIST
AUDIT TOTAL SCORE: 5
TREMOR: NOT VISIBLE, BUT CAN BE FELT FINGERTIP TO FINGERTIP
ANXIETY: MODERATELY ANXIOUS, OR GUARDED, SO ANXIETY IS INFERRED
HOW OFTEN DO YOU HAVE A DRINK CONTAINING ALCOHOL: 4 OR MORE TIMES A WEEK
AGITATION: MODERATELY FIDGETY AND RESTLESS
AUDIT-C TOTAL SCORE: 5
HEADACHE, FULLNESS IN HEAD: NOT PRESENT
BLOOD PRESSURE: 187/98
VISUAL DISTURBANCES: NOT PRESENT
HEADACHE, FULLNESS IN HEAD: NOT PRESENT
VISUAL DISTURBANCES: NOT PRESENT
TREMOR: NOT VISIBLE, BUT CAN BE FELT FINGERTIP TO FINGERTIP
AGITATION: MODERATELY FIDGETY AND RESTLESS
BLOOD PRESSURE: 140/85
TOTAL SCORE: 14

## 2024-11-19 ASSESSMENT — ACTIVITIES OF DAILY LIVING (ADL)
ASSISTIVE_DEVICE: CANE;WALKER;WHEELCHAIR
FEEDING YOURSELF: DEPENDENT
HEARING - RIGHT EAR: FUNCTIONAL
WALKS IN HOME: DEPENDENT
GROOMING: DEPENDENT
LACK_OF_TRANSPORTATION: NO
HEARING - LEFT EAR: FUNCTIONAL
DRESSING YOURSELF: DEPENDENT
PATIENT'S MEMORY ADEQUATE TO SAFELY COMPLETE DAILY ACTIVITIES?: YES
JUDGMENT_ADEQUATE_SAFELY_COMPLETE_DAILY_ACTIVITIES: NO
ADEQUATE_TO_COMPLETE_ADL: YES
LACK_OF_TRANSPORTATION: NO
BATHING: DEPENDENT
TOILETING: DEPENDENT

## 2024-11-19 ASSESSMENT — COGNITIVE AND FUNCTIONAL STATUS - GENERAL
WALKING IN HOSPITAL ROOM: A LOT
MOBILITY SCORE: 12
DRESSING REGULAR UPPER BODY CLOTHING: A LOT
MOBILITY SCORE: 12
HELP NEEDED FOR BATHING: A LOT
DRESSING REGULAR UPPER BODY CLOTHING: A LOT
STANDING UP FROM CHAIR USING ARMS: A LOT
HELP NEEDED FOR BATHING: A LOT
DRESSING REGULAR LOWER BODY CLOTHING: A LOT
WALKING IN HOSPITAL ROOM: A LOT
MOVING TO AND FROM BED TO CHAIR: A LOT
PERSONAL GROOMING: A LOT
STANDING UP FROM CHAIR USING ARMS: A LOT
DRESSING REGULAR UPPER BODY CLOTHING: A LOT
TOILETING: A LOT
MOVING TO AND FROM BED TO CHAIR: A LOT
TURNING FROM BACK TO SIDE WHILE IN FLAT BAD: A LOT
HELP NEEDED FOR BATHING: A LOT
EATING MEALS: A LOT
DAILY ACTIVITIY SCORE: 12
PATIENT BASELINE BEDBOUND: NO
PERSONAL GROOMING: A LOT
EATING MEALS: A LOT
DRESSING REGULAR LOWER BODY CLOTHING: A LOT
MOVING FROM LYING ON BACK TO SITTING ON SIDE OF FLAT BED WITH BEDRAILS: A LOT
MOVING FROM LYING ON BACK TO SITTING ON SIDE OF FLAT BED WITH BEDRAILS: A LOT
DAILY ACTIVITIY SCORE: 12
DAILY ACTIVITIY SCORE: 12
MOVING TO AND FROM BED TO CHAIR: A LOT
MOVING FROM LYING ON BACK TO SITTING ON SIDE OF FLAT BED WITH BEDRAILS: A LOT
TURNING FROM BACK TO SIDE WHILE IN FLAT BAD: A LOT
PERSONAL GROOMING: A LOT
TOILETING: A LOT
TURNING FROM BACK TO SIDE WHILE IN FLAT BAD: A LOT
CLIMB 3 TO 5 STEPS WITH RAILING: A LOT
MOBILITY SCORE: 12
CLIMB 3 TO 5 STEPS WITH RAILING: A LOT
TOILETING: A LOT
DRESSING REGULAR LOWER BODY CLOTHING: A LOT
CLIMB 3 TO 5 STEPS WITH RAILING: A LOT
STANDING UP FROM CHAIR USING ARMS: A LOT
WALKING IN HOSPITAL ROOM: A LOT
EATING MEALS: A LOT

## 2024-11-19 ASSESSMENT — PATIENT HEALTH QUESTIONNAIRE - PHQ9
1. LITTLE INTEREST OR PLEASURE IN DOING THINGS: SEVERAL DAYS
SUM OF ALL RESPONSES TO PHQ9 QUESTIONS 1 & 2: 2
2. FEELING DOWN, DEPRESSED OR HOPELESS: SEVERAL DAYS

## 2024-11-19 ASSESSMENT — PAIN - FUNCTIONAL ASSESSMENT: PAIN_FUNCTIONAL_ASSESSMENT: UNABLE TO SELF-REPORT

## 2024-11-19 ASSESSMENT — PAIN SCALES - WONG BAKER
WONGBAKER_NUMERICALRESPONSE: NO HURT
WONGBAKER_NUMERICALRESPONSE: NO HURT

## 2024-11-19 NOTE — PROGRESS NOTES
Emergency Medicine Transition of Care Note.    I received Ivan Bravo in signout from Dr. Lemons.  Please see the previous ED provider note for all HPI, PE and MDM up to the time of signout at 0700. This is in addition to the primary record.    In brief Ivan Bravo is an 69 y.o. male presenting for   Chief Complaint   Patient presents with    Altered Mental Status     PT PRESENTS TO THE ED VIA EMS FROM HOME AFTER NOT BEING ABLE TO GET OUT OF BED FOR TWO  DAYS AND NOT ACTING HIMSELF. PT HAS A HISTORY OF AFIB     At the time of signout we were awaiting: meds and reeval    Diagnoses as of 11/19/24 0727   Atrial fibrillation with RVR (Multi)   Pneumonia of both lungs due to infectious organism, unspecified part of lung   Altered mental status, unspecified altered mental status type       Medical Decision Making      Final diagnoses:   [I48.91] Atrial fibrillation with RVR (Multi)   [J18.9] Pneumonia of both lungs due to infectious organism, unspecified part of lung   [R41.82] Altered mental status, unspecified altered mental status type     69-year-old male presents from home for not feeling well for couple days.  Not eating or drinking much.  Denies chest pain or abdominal pain.  He is on Pradaxa for a history of chronic A-fib.  Patient was started on diltiazem drip by the previous provider.  I did monitor his response.  He was doing well on 10 therefore I was able to wean him down to 7-1/2.  Patient is already on anticoagulation.  Chest x-ray was suggesting pneumonia as well.  Patient was given a liter of IV fluids.  He was given IV antibiotics.  At this time he is not requiring oxygen.  Patient was admitted to the intermediate care unit under Dr. Arevalo.      Procedure  Procedures    Ariadna Mayo MD

## 2024-11-19 NOTE — CARE PLAN
The patient's goals for the shift include pt unabl to answer    The clinical goals for the shift include safety

## 2024-11-19 NOTE — CARE PLAN
The patient's goals for the shift include pt unabl to answer    The clinical goals for the shift include safety      Problem: Pain - Adult  Goal: Verbalizes/displays adequate comfort level or baseline comfort level  11/19/2024 1453 by Toya Swartz RN  Outcome: Not Progressing  11/19/2024 1449 by Toya Swartz RN  Outcome: Not Progressing  11/19/2024 1252 by Toya Swartz RN  Outcome: Progressing     Problem: Safety - Adult  Goal: Free from fall injury  11/19/2024 1453 by Toya Swartz RN  Outcome: Not Progressing  11/19/2024 1449 by Toya Swartz RN  Outcome: Not Progressing  11/19/2024 1252 by Toya Swartz RN  Outcome: Progressing     Problem: Discharge Planning  Goal: Discharge to home or other facility with appropriate resources  11/19/2024 1453 by Toya Swartz RN  Outcome: Not Progressing  11/19/2024 1449 by Toya Swartz RN  Outcome: Not Progressing  11/19/2024 1252 by Toya Swartz RN  Outcome: Progressing     Problem: Chronic Conditions and Co-morbidities  Goal: Patient's chronic conditions and co-morbidity symptoms are monitored and maintained or improved  11/19/2024 1453 by Toya Swartz RN  Outcome: Not Progressing  11/19/2024 1449 by Toya Swartz RN  Outcome: Not Progressing  11/19/2024 1252 by Toya Swartz RN  Outcome: Progressing

## 2024-11-19 NOTE — CARE PLAN
The patient's goals for the shift include pt unabl to answer    The clinical goals for the shift include safety      Problem: Pain - Adult  Goal: Verbalizes/displays adequate comfort level or baseline comfort level  11/19/2024 1453 by Tyoa Swartz RN  Outcome: Not Progressing  11/19/2024 1449 by Toya Swartz RN  Outcome: Not Progressing  11/19/2024 1252 by Toya Swartz RN  Outcome: Progressing     Problem: Safety - Adult  Goal: Free from fall injury  11/19/2024 1453 by Toya Swartz RN  Outcome: Not Progressing  11/19/2024 1449 by Toya Swartz RN  Outcome: Not Progressing  11/19/2024 1252 by Toya Swartz RN  Outcome: Progressing     Problem: Discharge Planning  Goal: Discharge to home or other facility with appropriate resources  11/19/2024 1453 by Toya Swartz RN  Outcome: Not Progressing  11/19/2024 1449 by Toya Swartz RN  Outcome: Not Progressing  11/19/2024 1252 by Toya Swartz RN  Outcome: Progressing     Problem: Chronic Conditions and Co-morbidities  Goal: Patient's chronic conditions and co-morbidity symptoms are monitored and maintained or improved  11/19/2024 1453 by Toya Swartz RN  Outcome: Not Progressing  11/19/2024 1449 by Toya Swartz RN  Outcome: Not Progressing  11/19/2024 1252 by Toya Swartz RN  Outcome: Progressing

## 2024-11-19 NOTE — H&P
Medical Group History and Physical  ASSESSMENT & PLAN:     Metabolic encephalopathy  - Unclear etiology, but at baseline patient is alert and oriented x 3 with no cognitive issues  - On my exam not alert nor oriented, unable to participate in exam, very restless  - Likely in setting of pneumonia, chronic malnourished state    Paroxysmal atrial fibrillation   Patient with RVR, resolved  Long-term anticoagulation use  - Was in A-fib with RVR in the ED, resolved on Cardizem drip  - Cardizem drip stopped and patient's heart rates continue to do well  - Home metoprolol increased from 12.5 mg twice daily to 50 mg twice daily  - However patient unable to swallow at this time with his mentation therefore started on 5 mg IV every 6 until oral intake is able  - Lovenox full dose subcutaneous twice daily and resume Pradaxa once able to tolerate oral intake    Community-acquired bacterial pneumonia  - Chest x-ray with concerns for left lung pneumonia  - Continue ceftriaxone and azithromycin  - Follow-up strep pneumo antigen, Legionella urine antigen    Chronic severe protein calorie malnutrition  - Dietitian consult for confirmation of diagnosis and education    Alcohol use disorder  - Consumes 2-3 beers per day with last drink being on 11/16  - CIWA protocol with Ativan    Lung nodule  - [facility], medical records request sent for results    Had a long discussion with patient's wife at bedside, she would like for us to continue full code at this time.  Aware of guarded prognosis with patient's chronic severe malnourished state, severe alcohol use disorder and tobacco use disorder compounding with patient's pneumonia and metabolic encephalopathy.    VTE Prophylaxis: Enoxaparin subcutaneous      ---Of note, this documentation is completed using the Dragon Dictation system (voice recognition software). There may be spelling and/or grammatical errors that were not corrected  prior to final submission.---    Kevin Brown MD    HISTORY OF PRESENT ILLNESS:   Chief Complaint: Confusion    History Of Present Illness:    Ivan Bravo is a 69 y.o. male with a significant past medical history of paroxysmal atrial fibrillation, long-term anticoagulation use, hidradenitis suppurativa, lung nodules that was brought in by his wife due to changes in behavior.  She states that on Saturday 11/16 and 11/17 he was at his normal mentation and behavior.  However on the evening of 11/17, became less responsive and yesterday was very incoherent which prompted her to bring her to the ED overnight.  On my exam patient was not alert and oriented, unable to participate in exam, was very restless and moving on his bed trying to get up.  Patient's wife states that this is never happened before.    On a daily basis patient consumes very minimal oral intake with eating no more than approximately 1 can of soup per day.  Drinks 2-3 beers per day as well.  At baseline patient uses devices for ambulation, uses sits behind a computer playing games all day, she is without mentation or memory/cognition deficits.    Patient had a bronchoscopy for a left lung nodule back in September 2024 however per his wife they have not received the results.    ED course:  - Vitals: Temperature 97.2, , /115, RR 16 saturating 99% on room air  - Labs: Unremarkable CBC.  Sodium of 132 otherwise unremarkable CMP  - Imaging: CT head without contrast with no acute findings.  Chest x-ray also showing a retrocardiac airspace opacity with concerns for pneumonia or aspiration.  Patient was started on Cardizem drip in the ED with improvement in his heart rates and was pause prior to arrival to the floor.     Review of systems: Unable to perform a 10 point review of systems with patient's current mentation and unable to precipitate in exam.    PAST HISTORIES:     Past Medical History: Atrial fibrillation, long-term anticoagulation  use, Hydro nidus of vertebra, lung nodule    Past Surgical History: Bronchoscopy      Social History: 1.5 pack/day tobacco smoker for unknown number of years, working on quitting.  Consumes 2-3 beers per day, last drink on Saturday.  Takes medical marijuana Gummies on a nightly basis.  Lives at home with his wife.  Uses a cane/walker/rollator/scooter for ambulatory assistance.    Family History: Patient unable to provide    Allergies: Oxycodone    OBJECTIVE:     Last Recorded Vitals:  Vitals:    11/19/24 0900 11/19/24 0930 11/19/24 1030 11/19/24 1100   BP:  167/71 126/66 142/79   BP Location:   Right arm Right arm   Patient Position:   Lying Lying   Pulse: 94 92 104 82   Resp: 20 20 22 18   Temp:   36.2 °C (97.2 °F) 37.9 °C (100.2 °F)   TempSrc:   Temporal Temporal   SpO2: 95% 96% 93% 93%   Weight:       Height:         Last I/O:  No intake/output data recorded.    Physical Exam  Vitals reviewed.   Constitutional:       General: He is not in acute distress.     Appearance: He is not ill-appearing.      Comments: Not alert or oriented.  Very frail/malnourished in appearance.   HENT:      Head: Normocephalic and atraumatic.   Eyes:      Extraocular Movements: Extraocular movements intact.      Conjunctiva/sclera: Conjunctivae normal.   Cardiovascular:      Rate and Rhythm: Normal rate and regular rhythm.      Pulses: Normal pulses.      Heart sounds: Normal heart sounds.   Pulmonary:      Effort: Pulmonary effort is normal.      Breath sounds: Normal breath sounds.   Abdominal:      General: Bowel sounds are normal. There is no distension.      Palpations: Abdomen is soft.      Tenderness: There is no abdominal tenderness. There is no guarding.   Musculoskeletal:         General: No swelling or tenderness. Normal range of motion.      Cervical back: Normal range of motion and neck supple.   Skin:     Comments: Refer to media section for picture of patient's lower back skin on my exam today   Neurological:      Mental  Status: He is disoriented.      Comments: Not alert or oriented, unable to obtain neurological exam.       Scheduled Medications  LORazepam, 1 mg, intramuscular, Once  metoprolol, 5 mg, intravenous, q6h  metoprolol tartrate, 50 mg, oral, BID      PRN Medications    Continuous Medications       Outpatient Medications:  Prior to Admission medications    Medication Sig Start Date End Date Taking? Authorizing Provider   cyclobenzaprine (Flexeril) 10 mg tablet Take 1 tablet (10 mg) by mouth 3 times a day as needed. 6/21/24  Yes Historical Provider, MD   dabigatran etexilate (Pradaxa) 150 mg capsule Take 1 capsule (150 mg) by mouth. 6/26/24  Yes Historical Provider, MD   gabapentin (Neurontin) 400 mg capsule Take 1 capsule (400 mg) by mouth. 2/14/24  Yes Historical Provider, MD   metoprolol tartrate (Lopressor) 25 mg tablet Take 0.5 tablets (12.5 mg) by mouth. 9/3/24  Yes Historical Provider, MD       LABS AND IMAGING:     Labs:  Results from last 7 days   Lab Units 11/19/24  0315   WBC AUTO x10*3/uL 10.0   RBC AUTO x10*6/uL 5.52   HEMOGLOBIN g/dL 17.5   HEMATOCRIT % 53.5*   MCV fL 97   MCH pg 31.7   MCHC g/dL 32.7   RDW % 14.2   PLATELETS AUTO x10*3/uL 377     Results from last 7 days   Lab Units 11/19/24  0343   SODIUM mmol/L 132*   POTASSIUM mmol/L 3.9   CHLORIDE mmol/L 92*   CO2 mmol/L 21   BUN mg/dL 15   CREATININE mg/dL 1.19   GLUCOSE mg/dL 71*   PROTEIN TOTAL g/dL 9.0*   CALCIUM mg/dL 9.9   BILIRUBIN TOTAL mg/dL 1.2   ALK PHOS U/L 88   AST U/L 19   ALT U/L 5*         Results from last 7 days   Lab Units 11/19/24  0343   TROPHS ng/L 10       Imaging:  ECG 12 lead  Atrial fibrillation  Rightward axis  Anteroseptal infarct (cited on or before 09-MAY-2020)  Marked ST abnormality, possible inferior subendocardial injury  Abnormal ECG  When compared with ECG of 19-NOV-2024 06:02, (unconfirmed)  No significant change was found  See ED provider note for full interpretation and clinical correlation  Confirmed by Bean  Leslee ([id number]) on 11/19/2024 11:04:23 AM  ECG 12 lead  Atrial fibrillation with rapid ventricular response with premature ventricular or aberrantly conducted complexes  Anteroseptal infarct (cited on or before 09-MAY-2020)  Marked ST abnormality, possible inferior subendocardial injury  Abnormal ECG  When compared with ECG of 09-MAY-2020 09:47,  ST more depressed in Inferior leads  Inverted T waves have replaced nonspecific T wave abnormality in Inferior leads  T wave inversion now evident in Lateral leads  See ED provider note for full interpretation and clinical correlation  Confirmed by Leslee Del Angel ([id number]) on 11/19/2024 11:03:59 AM  ECG 12 lead  Atrial fibrillation with rapid ventricular response  Rightward axis  Anteroseptal infarct (cited on or before 09-MAY-2020)  Marked ST abnormality, possible inferior subendocardial injury  Abnormal ECG  When compared with ECG of 19-NOV-2024 05:59, (unconfirmed)  No significant change was found  See ED provider note for full interpretation and clinical correlation  Confirmed by Leslee Del Angel ([id number]) on 11/19/2024 11:03:40 AM  XR chest 1 view  Narrative: Interpreted By:  Finkelstein, Evan,   STUDY:  XR CHEST 1 VIEW;  11/19/2024 5:24 am      INDICATION:  Signs/Symptoms:aspiration.          COMPARISON:  Chest radiograph 05/09/2020      ACCESSION NUMBER(S):  [id number]      ORDERING CLINICIAN:  PILAR EASLEY      FINDINGS:  Median sternotomy wires are present.      CARDIOMEDIASTINAL SILHOUETTE:  Cardiomediastinal silhouette is stable in size and configuration.      LUNGS:  Retrocardiac airspace opacity. Skin folds overlie the right  hemithorax. No sizable pleural effusion or pneumothorax.      ABDOMEN:  No remarkable upper abdominal findings.      BONES:  No acute osseous abnormality.      Impression: Retrocardiac airspace opacity which may represent atelectasis or  pneumonia/aspiration in the appropriate clinical setting.      MACRO:  None.      Signed by:  Evan Finkelstein 11/19/2024 6:21 AM  Dictation workstation:   [id number]  CT head wo IV contrast  Narrative: Interpreted By:  Finkelstein, Evan,   STUDY:  CT HEAD WO IV CONTRAST;  11/19/2024 5:23 am      INDICATION:  Signs/Symptoms:ams.          COMPARISON:  CT brain 05/12/2020      ACCESSION NUMBER(S):  [id number]      ORDERING CLINICIAN:  PILAR EASLEY      TECHNIQUE:  Axial noncontrast CT images of the head with coronal and sagittal  reconstructions.      FINDINGS:  EXTRACRANIAL SOFT TISSUES: Unremarkable.      CALVARIUM: No depressed skull fracture. No destructive osseous lesion.      PARANASAL SINUSES/MASTOIDS: The visualized paranasal sinuses and  mastoid air cells are aerated.      HEMORRHAGE: No acute intracranial hemorrhage.      BRAIN PARENCHYMA: Gray-white matter interfaces are preserved. No mass  effect or midline shift. There are nonspecific scattered white matter  hypodensities.      VENTRICLES and EXTRA-AXIAL SPACES: Parenchymal atrophy with  prominence of the ventricles and cortical sulci.      OTHER FINDINGS: There are calcifications within the cavernous carotids      Impression: No acute intracranial hemorrhage, mass effect or midline shift.      Nonspecific scattered white matter hypodensities favored to represent  sequela of small vessel ischemia.          MACRO:  None.      Signed by: Evan Finkelstein 11/19/2024 5:31 AM  Dictation workstation:   [id number]

## 2024-11-19 NOTE — CARE PLAN
The patient's goals for the shift include pt unabl to answer    The clinical goals for the shift include safety      Problem: Pain - Adult  Goal: Verbalizes/displays adequate comfort level or baseline comfort level  11/19/2024 1449 by Toya Swartz RN  Outcome: Not Progressing  11/19/2024 1252 by Toya Swartz RN  Outcome: Progressing     Problem: Safety - Adult  Goal: Free from fall injury  11/19/2024 1449 by Toya Swartz RN  Outcome: Not Progressing  11/19/2024 1252 by Toya Swartz RN  Outcome: Progressing     Problem: Discharge Planning  Goal: Discharge to home or other facility with appropriate resources  11/19/2024 1449 by Toya Swartz RN  Outcome: Not Progressing  11/19/2024 1252 by Toya Swartz RN  Outcome: Progressing     Problem: Chronic Conditions and Co-morbidities  Goal: Patient's chronic conditions and co-morbidity symptoms are monitored and maintained or improved  11/19/2024 1449 by Toya Swartz RN  Outcome: Not Progressing  11/19/2024 1252 by Toya Swartz RN  Outcome: Progressing

## 2024-11-19 NOTE — ED PROVIDER NOTES
HPI   Chief Complaint   Patient presents with    Altered Mental Status     PT PRESENTS TO THE ED VIA EMS FROM HOME AFTER NOT BEING ABLE TO GET OUT OF BED FOR TWO  DAYS AND NOT ACTING HIMSELF. PT HAS A HISTORY OF AFIB       She was also fingers chief complaint generalized weakness  History of present illness 69-year-old male who lives with his wife currently was diagnosed of pulmonary nodules recently.  Last 2 days he has had decreased level of activity has not been eating or drinking, per the family's history is obtained through them.  He has not been gaining weight over nausea.  Triage room 14 brought by squad with a blood pressure 225/136 temp 36 pulse of 116 respiratory rate 22 pulse ox 98%              Patient History   History reviewed. No pertinent past medical history.  History reviewed. No pertinent surgical history.  No family history on file.  Social History     Tobacco Use    Smoking status: Unknown    Smokeless tobacco: Not on file   Substance Use Topics    Alcohol use: Yes     Alcohol/week: 12.0 standard drinks of alcohol     Types: 12 Cans of beer per week    Drug use: Not Currently       Physical Exam   ED Triage Vitals   Temp Pulse Resp BP   -- -- -- --      SpO2 Temp src Heart Rate Source Patient Position   -- -- -- --      BP Location FiO2 (%)     -- --       Physical Exam  Vitals and nursing note reviewed.   Constitutional:       General: He is in acute distress.      Appearance: He is ill-appearing.   HENT:      Head: Normocephalic.      Right Ear: Tympanic membrane normal.      Left Ear: Tympanic membrane normal.   Eyes:      Pupils: Pupils are equal, round, and reactive to light.   Cardiovascular:      Rate and Rhythm: Regular rhythm. Tachycardia present.   Pulmonary:      Effort: Pulmonary effort is normal.      Breath sounds: Wheezing and rhonchi present.   Abdominal:      Palpations: Abdomen is soft.   Musculoskeletal:      Cervical back: Normal range of motion.   Skin:     General: Skin is  warm.      Capillary Refill: Capillary refill takes less than 2 seconds.   Neurological:      General: No focal deficit present.      Mental Status: He is alert and oriented to person, place, and time.   Psychiatric:         Mood and Affect: Mood normal.           ED Course & MDM   Diagnoses as of 11/19/24 2119   Atrial fibrillation with RVR (Multi)   Pneumonia of both lungs due to infectious organism, unspecified part of lung   Altered mental status, unspecified altered mental status type                 No data recorded     Muna Coma Scale Score: 8 (11/19/24 1927 : Daina Milian RN)                           Medical Decision Making  Differential diagnosis  Intracranial hemorrhage okay   hepatic encephalopathy  Hypertensive urgency  Pulmonary cancer  Arrhythmia  Cardiac monitor CT of the head chest x-ray blood work CBC electrolytes EKGs were done Cardizem Cardizem drip.  Ativan and Haldol were given heart rate in the 190s)      Amount and/or Complexity of Data Reviewed  ECG/medicine tests: ordered and independent interpretation performed.     Details:  EKG reveals atrial fibrillation with a rate of 124 EKG #1 no acute ischemic changes noted  EKG 2 afibrillation rate 95    Discussion of management or test interpretation with external provider(s): Patient was placed on Cardizem drip IV antibiotics were given and admitted    Risk  Decision regarding hospitalization.      Labs Reviewed   CBC WITH AUTO DIFFERENTIAL - Abnormal       Result Value    WBC 10.0      nRBC 0.0      RBC 5.52      Hemoglobin 17.5      Hematocrit 53.5 (*)     MCV 97      MCH 31.7      MCHC 32.7      RDW 14.2      Platelets 377      Neutrophils % 71.1      Immature Granulocytes %, Automated 0.7      Lymphocytes % 15.0      Monocytes % 8.5      Eosinophils % 4.0      Basophils % 0.7      Neutrophils Absolute 7.14 (*)     Immature Granulocytes Absolute, Automated 0.07      Lymphocytes Absolute 1.50      Monocytes Absolute 0.85 (*)      Eosinophils Absolute 0.40      Basophils Absolute 0.07     COMPREHENSIVE METABOLIC PANEL - Abnormal    Glucose 71 (*)     Sodium 132 (*)     Potassium 3.9      Chloride 92 (*)     Bicarbonate 21      Anion Gap 23 (*)     Urea Nitrogen 15      Creatinine 1.19      eGFR 65      Calcium 9.9      Albumin 4.4      Alkaline Phosphatase 88      Total Protein 9.0 (*)     AST 19      Bilirubin, Total 1.2      ALT 5 (*)    B-TYPE NATRIURETIC PEPTIDE - Abnormal     (*)     Narrative:        <100 pg/mL - Heart failure unlikely  100-299 pg/mL - Intermediate probability of acute heart                  failure exacerbation. Correlate with clinical                  context and patient history.    >=300 pg/mL - Heart Failure likely. Correlate with clinical                  context and patient history.    BNP testing is performed using different testing methodology at [facility] than at other [facility]. Direct result comparisons should only be made within the same method.      DRUG SCREEN,URINE - Abnormal    Amphetamine Screen, Urine Presumptive Negative      Barbiturate Screen, Urine Presumptive Negative      Benzodiazepines Screen, Urine Presumptive Negative      Cannabinoid Screen, Urine Presumptive Positive (*)     Cocaine Metabolite Screen, Urine Presumptive Negative      Fentanyl Screen, Urine Presumptive Negative      Opiate Screen, Urine Presumptive Negative      Oxycodone Screen, Urine Presumptive Negative      PCP Screen, Urine Presumptive Negative      Methadone Screen, Urine Presumptive Negative      Narrative:     Drug screen results are presumptive and should not be used to assess   compliance with prescribed medication. Contact the performing [facility] [place] laboratory   to add-on definitive confirmatory testing if clinically indicated.    Toxicology screening results are reported qualitatively. The concentration must   be greater than or equal to the cutoff to be reported as positive. The  concentration   at which the screening test can detect an individual drug or metabolite varies.   The absence of expected drug(s) and/or drug metabolite(s) may indicate non-compliance,   inappropriate timing of specimen collection relative to drug administration, poor drug   absorption, diluted/adulterated urine, or limitations of testing. For medical purposes   only; not valid for forensic use.    Interpretive questions should be directed to the laboratory medical directors.   URINALYSIS WITH REFLEX CULTURE AND MICROSCOPIC - Abnormal    Color, Urine Yellow      Appearance, Urine Clear      Specific Gravity, Urine 1.016      pH, Urine 5.5      Protein, Urine 200 (2+) (*)     Glucose, Urine Normal      Blood, Urine 0.1 (1+) (*)     Ketones, Urine 80 (3+) (*)     Bilirubin, Urine NEGATIVE      Urobilinogen, Urine Normal      Nitrite, Urine NEGATIVE      Leukocyte Esterase, Urine NEGATIVE     POCT GLUCOSE - Abnormal    POCT Glucose 100 (*)    URINALYSIS MICROSCOPIC WITH REFLEX CULTURE - Abnormal    WBC, Urine 1-5      RBC, Urine 1-2      Hyaline Casts, Urine 1+ (*)    BLOOD CULTURE - Normal    Blood Culture Loaded on Instrument - Culture in progress     BLOOD CULTURE - Normal    Blood Culture Loaded on Instrument - Culture in progress     LIPASE - Normal    Lipase 16      Narrative:     Venipuncture immediately after or during the administration of Metamizole may lead to falsely low results. Testing should be performed immediately prior to Metamizole dosing.   LACTATE - Normal    Lactate 1.5      Narrative:     Venipuncture immediately after or during the administration of Metamizole may lead to falsely low results. Testing should be performed immediately prior to Metamizole dosing.   AMMONIA - Normal    Ammonia 24     TROPONIN I, HIGH SENSITIVITY - Normal    Troponin I, High Sensitivity 10      Narrative:     Less than 99th percentile of normal range cutoff-  Female and children under 18 years old <14 ng/L; Male <21  ng/L: Negative  Repeat testing should be performed if clinically indicated.     Female and children under 18 years old 14-50 ng/L; Male 21-50 ng/L:  Consistent with possible cardiac damage and possible increased clinical   risk. Serial measurements may help to assess extent of myocardial damage.     >50 ng/L: Consistent with cardiac damage, increased clinical risk and  myocardial infarction. Serial measurements may help assess extent of   myocardial damage.      NOTE: Children less than 1 year old may have higher baseline troponin   levels and results should be interpreted in conjunction with the overall   clinical context.     NOTE: Troponin I testing is performed using a different   testing methodology at [facility] than at other   [facility]. Direct result comparisons should only   be made within the same method.   ALCOHOL - Normal    Alcohol <10     CREATINE KINASE - Normal    Creatine Kinase 40     STREPTOCOCCUS PNEUMONIAE ANTIGEN, URINE   LEGIONELLA ANTIGEN, URINE   URINALYSIS WITH REFLEX CULTURE AND MICROSCOPIC    Narrative:     The following orders were created for panel order Urinalysis with Reflex Culture and Microscopic.  Procedure                               Abnormality         Status                     ---------                               -----------         ------                     Urinalysis with Reflex C...[[id number]]  Abnormal            Final result               Extra Urine Gray Tube[[id number]]                            In process                   Please view results for these tests on the individual orders.   EXTRA URINE GRAY TUBE   BASIC METABOLIC PANEL   CBC WITH AUTO DIFFERENTIAL   MAGNESIUM   POCT GLUCOSE METER        XR chest 1 view   Final Result   Retrocardiac airspace opacity which may represent atelectasis or   pneumonia/aspiration in the appropriate clinical setting.        MACRO:   None.        Signed by: Evan Finkelstein 11/19/2024 6:21 AM   Dictation  workstation:   [id number]      CT head wo IV contrast   Final Result   No acute intracranial hemorrhage, mass effect or midline shift.        Nonspecific scattered white matter hypodensities favored to represent   sequela of small vessel ischemia.             MACRO:   None.        Signed by: Evan Finkelstein 11/19/2024 5:31 AM   Dictation workstation:   [id number]          okay okay okay okay  Procedure  Procedures     Saundra Lemons MD  11/19/24 0728

## 2024-11-20 ENCOUNTER — APPOINTMENT (OUTPATIENT)
Dept: CARDIOLOGY | Facility: HOSPITAL | Age: 69
DRG: 177 | End: 2024-11-20
Payer: MEDICARE

## 2024-11-20 LAB
ALBUMIN SERPL BCP-MCNC: 3.6 G/DL (ref 3.4–5)
ANION GAP SERPL CALC-SCNC: 18 MMOL/L (ref 10–20)
ANION GAP SERPL CALC-SCNC: 18 MMOL/L (ref 10–20)
AORTIC VALVE PEAK VELOCITY: 0.82 M/S
ATRIAL RATE: 97 BPM
AV PEAK GRADIENT: 3 MMHG
BASOPHILS # BLD AUTO: 0.08 X10*3/UL (ref 0–0.1)
BASOPHILS NFR BLD AUTO: 0.6 %
BUN SERPL-MCNC: 13 MG/DL (ref 6–23)
BUN SERPL-MCNC: 15 MG/DL (ref 6–23)
CALCIUM SERPL-MCNC: 9.2 MG/DL (ref 8.6–10.3)
CALCIUM SERPL-MCNC: 9.5 MG/DL (ref 8.6–10.3)
CARDIAC TROPONIN I PNL SERPL HS: 32 NG/L (ref 0–20)
CHLORIDE SERPL-SCNC: 97 MMOL/L (ref 98–107)
CHLORIDE SERPL-SCNC: 98 MMOL/L (ref 98–107)
CO2 SERPL-SCNC: 23 MMOL/L (ref 21–32)
CO2 SERPL-SCNC: 24 MMOL/L (ref 21–32)
CREAT SERPL-MCNC: 1.23 MG/DL (ref 0.5–1.3)
CREAT SERPL-MCNC: 1.43 MG/DL (ref 0.5–1.3)
CRP SERPL-MCNC: 2.67 MG/DL
EGFRCR SERPLBLD CKD-EPI 2021: 52 ML/MIN/1.73M*2
EGFRCR SERPLBLD CKD-EPI 2021: 63 ML/MIN/1.73M*2
EJECTION FRACTION APICAL 4 CHAMBER: 45.2
EJECTION FRACTION: 53 %
EOSINOPHIL # BLD AUTO: 0.01 X10*3/UL (ref 0–0.4)
EOSINOPHIL NFR BLD AUTO: 0.1 %
ERYTHROCYTE [DISTWIDTH] IN BLOOD BY AUTOMATED COUNT: 14.1 % (ref 11.5–14.5)
ERYTHROCYTE [SEDIMENTATION RATE] IN BLOOD BY WESTERGREN METHOD: 67 MM/H (ref 0–20)
EST. AVERAGE GLUCOSE BLD GHB EST-MCNC: 88 MG/DL
GLUCOSE BLD MANUAL STRIP-MCNC: 156 MG/DL (ref 74–99)
GLUCOSE SERPL-MCNC: 140 MG/DL (ref 74–99)
GLUCOSE SERPL-MCNC: 163 MG/DL (ref 74–99)
HBA1C MFR BLD: 4.7 %
HCT VFR BLD AUTO: 46.2 % (ref 41–52)
HGB BLD-MCNC: 15.1 G/DL (ref 13.5–17.5)
IMM GRANULOCYTES # BLD AUTO: 0.09 X10*3/UL (ref 0–0.5)
IMM GRANULOCYTES NFR BLD AUTO: 0.7 % (ref 0–0.9)
LEFT VENTRICLE INTERNAL DIMENSION DIASTOLE: 4.59 CM (ref 3.5–6)
LEGIONELLA AG UR QL: NEGATIVE
LV EJECTION FRACTION BIPLANE: 45 %
LYMPHOCYTES # BLD AUTO: 1.4 X10*3/UL (ref 0.8–3)
LYMPHOCYTES NFR BLD AUTO: 10.3 %
MAGNESIUM SERPL-MCNC: 1.51 MG/DL (ref 1.6–2.4)
MAGNESIUM SERPL-MCNC: 2.59 MG/DL (ref 1.6–2.4)
MCH RBC QN AUTO: 30.9 PG (ref 26–34)
MCHC RBC AUTO-ENTMCNC: 32.7 G/DL (ref 32–36)
MCV RBC AUTO: 95 FL (ref 80–100)
MONOCYTES # BLD AUTO: 1.81 X10*3/UL (ref 0.05–0.8)
MONOCYTES NFR BLD AUTO: 13.3 %
NEUTROPHILS # BLD AUTO: 10.26 X10*3/UL (ref 1.6–5.5)
NEUTROPHILS NFR BLD AUTO: 75 %
NRBC BLD-RTO: 0 /100 WBCS (ref 0–0)
PHOSPHATE SERPL-MCNC: 2.3 MG/DL (ref 2.5–4.9)
PLATELET # BLD AUTO: 378 X10*3/UL (ref 150–450)
POTASSIUM SERPL-SCNC: 3.1 MMOL/L (ref 3.5–5.3)
POTASSIUM SERPL-SCNC: 3.6 MMOL/L (ref 3.5–5.3)
Q ONSET: 226 MS
QRS COUNT: 16 BEATS
QRS DURATION: 82 MS
QT INTERVAL: 392 MS
QTC CALCULATION(BAZETT): 497 MS
QTC FREDERICIA: 459 MS
R AXIS: 75 DEGREES
RBC # BLD AUTO: 4.88 X10*6/UL (ref 4.5–5.9)
S PNEUM AG UR QL: NEGATIVE
SODIUM SERPL-SCNC: 135 MMOL/L (ref 136–145)
SODIUM SERPL-SCNC: 136 MMOL/L (ref 136–145)
T AXIS: 75 DEGREES
T OFFSET: 422 MS
TSH SERPL-ACNC: 1.96 MIU/L (ref 0.44–3.98)
VENTRICULAR RATE: 97 BPM
WBC # BLD AUTO: 13.7 X10*3/UL (ref 4.4–11.3)

## 2024-11-20 PROCEDURE — 85025 COMPLETE CBC W/AUTO DIFF WBC: CPT | Performed by: STUDENT IN AN ORGANIZED HEALTH CARE EDUCATION/TRAINING PROGRAM

## 2024-11-20 PROCEDURE — 93010 ELECTROCARDIOGRAM REPORT: CPT | Performed by: INTERNAL MEDICINE

## 2024-11-20 PROCEDURE — 36415 COLL VENOUS BLD VENIPUNCTURE: CPT

## 2024-11-20 PROCEDURE — 2500000004 HC RX 250 GENERAL PHARMACY W/ HCPCS (ALT 636 FOR OP/ED): Performed by: NURSE PRACTITIONER

## 2024-11-20 PROCEDURE — 80048 BASIC METABOLIC PNL TOTAL CA: CPT | Mod: CCI

## 2024-11-20 PROCEDURE — 84484 ASSAY OF TROPONIN QUANT: CPT | Performed by: NURSE PRACTITIONER

## 2024-11-20 PROCEDURE — 99233 SBSQ HOSP IP/OBS HIGH 50: CPT | Performed by: STUDENT IN AN ORGANIZED HEALTH CARE EDUCATION/TRAINING PROGRAM

## 2024-11-20 PROCEDURE — C8929 TTE W OR WO FOL WCON,DOPPLER: HCPCS

## 2024-11-20 PROCEDURE — 93005 ELECTROCARDIOGRAM TRACING: CPT

## 2024-11-20 PROCEDURE — 83036 HEMOGLOBIN GLYCOSYLATED A1C: CPT | Mod: ELYLAB

## 2024-11-20 PROCEDURE — 99291 CRITICAL CARE FIRST HOUR: CPT

## 2024-11-20 PROCEDURE — 2500000005 HC RX 250 GENERAL PHARMACY W/O HCPCS

## 2024-11-20 PROCEDURE — 2500000004 HC RX 250 GENERAL PHARMACY W/ HCPCS (ALT 636 FOR OP/ED): Performed by: STUDENT IN AN ORGANIZED HEALTH CARE EDUCATION/TRAINING PROGRAM

## 2024-11-20 PROCEDURE — 83735 ASSAY OF MAGNESIUM: CPT | Performed by: STUDENT IN AN ORGANIZED HEALTH CARE EDUCATION/TRAINING PROGRAM

## 2024-11-20 PROCEDURE — 99223 1ST HOSP IP/OBS HIGH 75: CPT | Performed by: INTERNAL MEDICINE

## 2024-11-20 PROCEDURE — 2500000004 HC RX 250 GENERAL PHARMACY W/ HCPCS (ALT 636 FOR OP/ED)

## 2024-11-20 PROCEDURE — 2020000001 HC ICU ROOM DAILY

## 2024-11-20 PROCEDURE — 93306 TTE W/DOPPLER COMPLETE: CPT | Performed by: INTERNAL MEDICINE

## 2024-11-20 PROCEDURE — 84443 ASSAY THYROID STIM HORMONE: CPT

## 2024-11-20 PROCEDURE — 80048 BASIC METABOLIC PNL TOTAL CA: CPT | Performed by: STUDENT IN AN ORGANIZED HEALTH CARE EDUCATION/TRAINING PROGRAM

## 2024-11-20 PROCEDURE — 82947 ASSAY GLUCOSE BLOOD QUANT: CPT

## 2024-11-20 PROCEDURE — 36415 COLL VENOUS BLD VENIPUNCTURE: CPT | Performed by: STUDENT IN AN ORGANIZED HEALTH CARE EDUCATION/TRAINING PROGRAM

## 2024-11-20 PROCEDURE — 83735 ASSAY OF MAGNESIUM: CPT

## 2024-11-20 PROCEDURE — 85652 RBC SED RATE AUTOMATED: CPT | Performed by: STUDENT IN AN ORGANIZED HEALTH CARE EDUCATION/TRAINING PROGRAM

## 2024-11-20 RX ORDER — MULTIVIT-MIN/IRON FUM/FOLIC AC 7.5 MG-4
1 TABLET ORAL DAILY
Status: DISCONTINUED | OUTPATIENT
Start: 2024-11-20 | End: 2024-11-25 | Stop reason: HOSPADM

## 2024-11-20 RX ORDER — THIAMINE HYDROCHLORIDE 100 MG/ML
100 INJECTION, SOLUTION INTRAMUSCULAR; INTRAVENOUS DAILY
Status: DISCONTINUED | OUTPATIENT
Start: 2024-11-20 | End: 2024-11-23

## 2024-11-20 RX ORDER — LABETALOL HYDROCHLORIDE 5 MG/ML
10 INJECTION, SOLUTION INTRAVENOUS EVERY 6 HOURS PRN
Status: DISCONTINUED | OUTPATIENT
Start: 2024-11-20 | End: 2024-11-22

## 2024-11-20 RX ORDER — MAGNESIUM SULFATE HEPTAHYDRATE 40 MG/ML
4 INJECTION, SOLUTION INTRAVENOUS ONCE
Status: COMPLETED | OUTPATIENT
Start: 2024-11-20 | End: 2024-11-20

## 2024-11-20 RX ORDER — PHENOBARBITAL SODIUM 65 MG/ML
65 INJECTION, SOLUTION INTRAMUSCULAR; INTRAVENOUS EVERY 8 HOURS
Status: DISCONTINUED | OUTPATIENT
Start: 2024-11-20 | End: 2024-11-22

## 2024-11-20 RX ORDER — LABETALOL HYDROCHLORIDE 5 MG/ML
20 INJECTION, SOLUTION INTRAVENOUS ONCE
Status: COMPLETED | OUTPATIENT
Start: 2024-11-20 | End: 2024-11-20

## 2024-11-20 RX ORDER — PHENOBARBITAL SODIUM 65 MG/ML
130 INJECTION, SOLUTION INTRAMUSCULAR; INTRAVENOUS
Status: DISCONTINUED | OUTPATIENT
Start: 2024-11-20 | End: 2024-11-22

## 2024-11-20 RX ORDER — POTASSIUM CHLORIDE 14.9 MG/ML
20 INJECTION INTRAVENOUS ONCE
Status: COMPLETED | OUTPATIENT
Start: 2024-11-20 | End: 2024-11-20

## 2024-11-20 RX ORDER — LANOLIN ALCOHOL/MO/W.PET/CERES
100 CREAM (GRAM) TOPICAL DAILY
Status: DISCONTINUED | OUTPATIENT
Start: 2024-11-23 | End: 2024-11-25 | Stop reason: HOSPADM

## 2024-11-20 RX ORDER — METOPROLOL TARTRATE 1 MG/ML
5 INJECTION, SOLUTION INTRAVENOUS EVERY 4 HOURS
Status: DISCONTINUED | OUTPATIENT
Start: 2024-11-20 | End: 2024-11-20

## 2024-11-20 RX ORDER — ENOXAPARIN SODIUM 100 MG/ML
40 INJECTION SUBCUTANEOUS EVERY 24 HOURS
Status: DISCONTINUED | OUTPATIENT
Start: 2024-11-20 | End: 2024-11-21

## 2024-11-20 RX ORDER — HYDRALAZINE HYDROCHLORIDE 20 MG/ML
5 INJECTION INTRAMUSCULAR; INTRAVENOUS EVERY 6 HOURS PRN
Status: DISCONTINUED | OUTPATIENT
Start: 2024-11-20 | End: 2024-11-22

## 2024-11-20 RX ORDER — LABETALOL HYDROCHLORIDE 5 MG/ML
10 INJECTION, SOLUTION INTRAVENOUS ONCE
Status: COMPLETED | OUTPATIENT
Start: 2024-11-20 | End: 2024-11-20

## 2024-11-20 RX ORDER — PHENOBARBITAL SODIUM 65 MG/ML
32.5 INJECTION, SOLUTION INTRAMUSCULAR; INTRAVENOUS EVERY 8 HOURS
Status: DISCONTINUED | OUTPATIENT
Start: 2024-11-22 | End: 2024-11-22

## 2024-11-20 RX ADMIN — MAGNESIUM SULFATE HEPTAHYDRATE 4 G: 40 INJECTION, SOLUTION INTRAVENOUS at 10:19

## 2024-11-20 RX ADMIN — PHENOBARBITAL SODIUM 65 MG: 65 INJECTION INTRAMUSCULAR; INTRAVENOUS at 10:17

## 2024-11-20 RX ADMIN — LORAZEPAM 2 MG: 2 INJECTION INTRAMUSCULAR; INTRAVENOUS at 00:33

## 2024-11-20 RX ADMIN — THIAMINE HYDROCHLORIDE 100 MG: 100 INJECTION, SOLUTION INTRAMUSCULAR; INTRAVENOUS at 08:14

## 2024-11-20 RX ADMIN — METOPROLOL TARTRATE 5 MG: 5 INJECTION INTRAVENOUS at 08:21

## 2024-11-20 RX ADMIN — PERFLUTREN 2 ML OF DILUTION: 6.52 INJECTION, SUSPENSION INTRAVENOUS at 14:00

## 2024-11-20 RX ADMIN — SODIUM CHLORIDE, POTASSIUM CHLORIDE, SODIUM LACTATE AND CALCIUM CHLORIDE 1000 ML: 600; 310; 30; 20 INJECTION, SOLUTION INTRAVENOUS at 12:00

## 2024-11-20 RX ADMIN — ENOXAPARIN SODIUM 50 MG: 60 INJECTION SUBCUTANEOUS at 08:13

## 2024-11-20 RX ADMIN — POTASSIUM CHLORIDE 20 MEQ: 14.9 INJECTION, SOLUTION INTRAVENOUS at 08:18

## 2024-11-20 RX ADMIN — LORAZEPAM 2 MG: 2 INJECTION INTRAMUSCULAR; INTRAVENOUS at 04:27

## 2024-11-20 RX ADMIN — POTASSIUM PHOSPHATE, MONOBASIC AND POTASSIUM PHOSPHATE, DIBASIC 15 MMOL: 224; 236 INJECTION, SOLUTION, CONCENTRATE INTRAVENOUS at 18:26

## 2024-11-20 RX ADMIN — METOPROLOL TARTRATE 5 MG: 5 INJECTION INTRAVENOUS at 04:30

## 2024-11-20 RX ADMIN — CEFTRIAXONE SODIUM 1 G: 1 INJECTION, SOLUTION INTRAVENOUS at 17:42

## 2024-11-20 RX ADMIN — LORAZEPAM 2 MG: 2 INJECTION INTRAMUSCULAR; INTRAVENOUS at 06:44

## 2024-11-20 RX ADMIN — HYDRALAZINE HYDROCHLORIDE 5 MG: 20 INJECTION INTRAMUSCULAR; INTRAVENOUS at 16:30

## 2024-11-20 RX ADMIN — LORAZEPAM 2 MG: 2 INJECTION INTRAMUSCULAR; INTRAVENOUS at 02:29

## 2024-11-20 RX ADMIN — OLANZAPINE 5 MG: 10 INJECTION, POWDER, FOR SOLUTION INTRAMUSCULAR at 07:54

## 2024-11-20 RX ADMIN — AZITHROMYCIN MONOHYDRATE 500 MG: 500 INJECTION, POWDER, LYOPHILIZED, FOR SOLUTION INTRAVENOUS at 15:59

## 2024-11-20 RX ADMIN — PHENOBARBITAL SODIUM 65 MG: 65 INJECTION INTRAMUSCULAR; INTRAVENOUS at 17:42

## 2024-11-20 RX ADMIN — SODIUM CHLORIDE 500 ML: 9 INJECTION, SOLUTION INTRAVENOUS at 08:02

## 2024-11-20 RX ADMIN — LABETALOL HYDROCHLORIDE 20 MG: 5 INJECTION, SOLUTION INTRAVENOUS at 11:58

## 2024-11-20 RX ADMIN — LABETALOL HYDROCHLORIDE 10 MG: 5 INJECTION, SOLUTION INTRAVENOUS at 04:38

## 2024-11-20 ASSESSMENT — LIFESTYLE VARIABLES
PAROXYSMAL SWEATS: BARELY PERCEPTIBLE SWEATING, PALMS MOIST
VISUAL DISTURBANCES: NOT PRESENT
ORIENTATION AND CLOUDING OF SENSORIUM: DISORIENTED FOR PLACE OR PERSON
HEADACHE, FULLNESS IN HEAD: NOT PRESENT
TREMOR: NOT VISIBLE, BUT CAN BE FELT FINGERTIP TO FINGERTIP
ANXIETY: NO ANXIETY, AT EASE
TOTAL SCORE: 6
AGITATION: NORMAL ACTIVITY
ANXIETY: MODERATELY ANXIOUS, OR GUARDED, SO ANXIETY IS INFERRED
VISUAL DISTURBANCES: NOT PRESENT
AGITATION: MODERATELY FIDGETY AND RESTLESS
AUDITORY DISTURBANCES: NOT PRESENT
AGITATION: NORMAL ACTIVITY
TREMOR: NOT VISIBLE, BUT CAN BE FELT FINGERTIP TO FINGERTIP
TOTAL SCORE: 14
AGITATION: MODERATELY FIDGETY AND RESTLESS
VISUAL DISTURBANCES: NOT PRESENT
PULSE: 93
NAUSEA AND VOMITING: NO NAUSEA AND NO VOMITING
ANXIETY: NO ANXIETY, AT EASE
PAROXYSMAL SWEATS: BARELY PERCEPTIBLE SWEATING, PALMS MOIST
TREMOR: NOT VISIBLE, BUT CAN BE FELT FINGERTIP TO FINGERTIP
ANXIETY: MODERATELY ANXIOUS, OR GUARDED, SO ANXIETY IS INFERRED
ORIENTATION AND CLOUDING OF SENSORIUM: DISORIENTED FOR PLACE OR PERSON
NAUSEA AND VOMITING: NO NAUSEA AND NO VOMITING
TOTAL SCORE: 14
TOTAL SCORE: 14
AUDITORY DISTURBANCES: NOT PRESENT
BLOOD PRESSURE: 193/92
HEADACHE, FULLNESS IN HEAD: NOT PRESENT
AGITATION: NORMAL ACTIVITY
PAROXYSMAL SWEATS: BARELY PERCEPTIBLE SWEATING, PALMS MOIST
AGITATION: NORMAL ACTIVITY
HEADACHE, FULLNESS IN HEAD: NOT PRESENT
AUDITORY DISTURBANCES: NOT PRESENT
ORIENTATION AND CLOUDING OF SENSORIUM: DISORIENTED FOR PLACE OR PERSON
TREMOR: NOT VISIBLE, BUT CAN BE FELT FINGERTIP TO FINGERTIP
AGITATION: MODERATELY FIDGETY AND RESTLESS
TREMOR: NOT VISIBLE, BUT CAN BE FELT FINGERTIP TO FINGERTIP
AUDITORY DISTURBANCES: NOT PRESENT
PAROXYSMAL SWEATS: BARELY PERCEPTIBLE SWEATING, PALMS MOIST
ORIENTATION AND CLOUDING OF SENSORIUM: DISORIENTED FOR PLACE OR PERSON
TREMOR: NOT VISIBLE, BUT CAN BE FELT FINGERTIP TO FINGERTIP
NAUSEA AND VOMITING: NO NAUSEA AND NO VOMITING
ORIENTATION AND CLOUDING OF SENSORIUM: DISORIENTED FOR PLACE OR PERSON
AUDITORY DISTURBANCES: NOT PRESENT
AUDITORY DISTURBANCES: NOT PRESENT
NAUSEA AND VOMITING: NO NAUSEA AND NO VOMITING
VISUAL DISTURBANCES: NOT PRESENT
AUDITORY DISTURBANCES: NOT PRESENT
TOTAL SCORE: 8
AGITATION: MODERATELY FIDGETY AND RESTLESS
ANXIETY: MODERATELY ANXIOUS, OR GUARDED, SO ANXIETY IS INFERRED
AGITATION: MODERATELY FIDGETY AND RESTLESS
TREMOR: NOT VISIBLE, BUT CAN BE FELT FINGERTIP TO FINGERTIP
AUDITORY DISTURBANCES: NOT PRESENT
ANXIETY: MILDLY ANXIOUS
PAROXYSMAL SWEATS: BARELY PERCEPTIBLE SWEATING, PALMS MOIST
TOTAL SCORE: 5
VISUAL DISTURBANCES: NOT PRESENT
AUDITORY DISTURBANCES: NOT PRESENT
VISUAL DISTURBANCES: NOT PRESENT
PAROXYSMAL SWEATS: BARELY PERCEPTIBLE SWEATING, PALMS MOIST
VISUAL DISTURBANCES: NOT PRESENT
ORIENTATION AND CLOUDING OF SENSORIUM: DISORIENTED FOR PLACE OR PERSON
ORIENTATION AND CLOUDING OF SENSORIUM: DISORIENTED FOR PLACE OR PERSON
AGITATION: SOMEWHAT MORE THAN NORMAL ACTIVITY
PAROXYSMAL SWEATS: BARELY PERCEPTIBLE SWEATING, PALMS MOIST
HEADACHE, FULLNESS IN HEAD: NOT PRESENT
HEADACHE, FULLNESS IN HEAD: NOT PRESENT
ORIENTATION AND CLOUDING OF SENSORIUM: DISORIENTED FOR PLACE OR PERSON
PAROXYSMAL SWEATS: BARELY PERCEPTIBLE SWEATING, PALMS MOIST
HEADACHE, FULLNESS IN HEAD: NOT PRESENT
TOTAL SCORE: 14
PAROXYSMAL SWEATS: BARELY PERCEPTIBLE SWEATING, PALMS MOIST
HEADACHE, FULLNESS IN HEAD: NOT PRESENT
TOTAL SCORE: 8
NAUSEA AND VOMITING: NO NAUSEA AND NO VOMITING
AUDITORY DISTURBANCES: NOT PRESENT
TOTAL SCORE: 14
TREMOR: NOT VISIBLE, BUT CAN BE FELT FINGERTIP TO FINGERTIP
HEADACHE, FULLNESS IN HEAD: NOT PRESENT
PAROXYSMAL SWEATS: NO SWEAT VISIBLE
NAUSEA AND VOMITING: NO NAUSEA AND NO VOMITING
TOTAL SCORE: 14
ANXIETY: NO ANXIETY, AT EASE
AUDITORY DISTURBANCES: NOT PRESENT
NAUSEA AND VOMITING: NO NAUSEA AND NO VOMITING
ORIENTATION AND CLOUDING OF SENSORIUM: DISORIENTED FOR PLACE OR PERSON
NAUSEA AND VOMITING: NO NAUSEA AND NO VOMITING
TREMOR: NOT VISIBLE, BUT CAN BE FELT FINGERTIP TO FINGERTIP
TREMOR: NOT VISIBLE, BUT CAN BE FELT FINGERTIP TO FINGERTIP
BLOOD PRESSURE: 188/91
VISUAL DISTURBANCES: NOT PRESENT
NAUSEA AND VOMITING: NO NAUSEA AND NO VOMITING
PULSE: 122
BLOOD PRESSURE: 162/91
HEADACHE, FULLNESS IN HEAD: NOT PRESENT
VISUAL DISTURBANCES: NOT PRESENT
HEADACHE, FULLNESS IN HEAD: NOT PRESENT
NAUSEA AND VOMITING: NO NAUSEA AND NO VOMITING
AUDITORY DISTURBANCES: NOT PRESENT
AGITATION: MODERATELY FIDGETY AND RESTLESS
ANXIETY: NO ANXIETY, AT EASE
ANXIETY: MODERATELY ANXIOUS, OR GUARDED, SO ANXIETY IS INFERRED
VISUAL DISTURBANCES: NOT PRESENT
NAUSEA AND VOMITING: NO NAUSEA AND NO VOMITING
PAROXYSMAL SWEATS: BARELY PERCEPTIBLE SWEATING, PALMS MOIST
PAROXYSMAL SWEATS: BARELY PERCEPTIBLE SWEATING, PALMS MOIST
ANXIETY: MILDLY ANXIOUS
VISUAL DISTURBANCES: NOT PRESENT
TOTAL SCORE: 6
HEADACHE, FULLNESS IN HEAD: NOT PRESENT
ORIENTATION AND CLOUDING OF SENSORIUM: DISORIENTED FOR PLACE OR PERSON
HEADACHE, FULLNESS IN HEAD: NOT PRESENT
TREMOR: NOT VISIBLE, BUT CAN BE FELT FINGERTIP TO FINGERTIP
VISUAL DISTURBANCES: NOT PRESENT
ANXIETY: MODERATELY ANXIOUS, OR GUARDED, SO ANXIETY IS INFERRED
PULSE: 107
TOTAL SCORE: 6
NAUSEA AND VOMITING: NO NAUSEA AND NO VOMITING
AGITATION: SOMEWHAT MORE THAN NORMAL ACTIVITY
TREMOR: NOT VISIBLE, BUT CAN BE FELT FINGERTIP TO FINGERTIP
ANXIETY: MODERATELY ANXIOUS, OR GUARDED, SO ANXIETY IS INFERRED
PULSE: 104

## 2024-11-20 ASSESSMENT — COGNITIVE AND FUNCTIONAL STATUS - GENERAL
TOILETING: A LOT
STANDING UP FROM CHAIR USING ARMS: A LOT
STANDING UP FROM CHAIR USING ARMS: A LOT
WALKING IN HOSPITAL ROOM: A LOT
EATING MEALS: A LOT
EATING MEALS: A LOT
MOVING TO AND FROM BED TO CHAIR: A LOT
WALKING IN HOSPITAL ROOM: A LOT
DRESSING REGULAR LOWER BODY CLOTHING: A LOT
DAILY ACTIVITIY SCORE: 12
MOVING FROM LYING ON BACK TO SITTING ON SIDE OF FLAT BED WITH BEDRAILS: A LOT
HELP NEEDED FOR BATHING: A LOT
DAILY ACTIVITIY SCORE: 12
DRESSING REGULAR UPPER BODY CLOTHING: A LOT
HELP NEEDED FOR BATHING: A LOT
CLIMB 3 TO 5 STEPS WITH RAILING: A LOT
DRESSING REGULAR UPPER BODY CLOTHING: A LOT
TOILETING: A LOT
PERSONAL GROOMING: A LOT
PERSONAL GROOMING: A LOT
MOBILITY SCORE: 12
CLIMB 3 TO 5 STEPS WITH RAILING: A LOT
MOVING TO AND FROM BED TO CHAIR: A LOT
MOVING FROM LYING ON BACK TO SITTING ON SIDE OF FLAT BED WITH BEDRAILS: A LOT
DRESSING REGULAR LOWER BODY CLOTHING: A LOT
TURNING FROM BACK TO SIDE WHILE IN FLAT BAD: A LOT
TURNING FROM BACK TO SIDE WHILE IN FLAT BAD: A LOT
MOBILITY SCORE: 12

## 2024-11-20 ASSESSMENT — PAIN - FUNCTIONAL ASSESSMENT
PAIN_FUNCTIONAL_ASSESSMENT: CPOT (CRITICAL CARE PAIN OBSERVATION TOOL)
PAIN_FUNCTIONAL_ASSESSMENT: WONG-BAKER FACES

## 2024-11-20 ASSESSMENT — PAIN SCALES - WONG BAKER
WONGBAKER_NUMERICALRESPONSE: NO HURT

## 2024-11-20 NOTE — PROGRESS NOTES
[facility] Critical Care Medicine       Date:  11/20/2024  Patient:  Ivan Bravo  YOB: 1955  MRN:  [id number]   Admit Date:  11/19/2024  ========================================================================================================    Chief Complaint   Patient presents with    Altered Mental Status     PT PRESENTS TO THE ED VIA EMS FROM HOME AFTER NOT BEING ABLE TO GET OUT OF BED FOR TWO  DAYS AND NOT ACTING HIMSELF. PT HAS A HISTORY OF AFIB         History of Present Illness:  Ivan Bravo is a 69 y.o. year old male patient with Past Medical History of  pAfib on Pradaxa, ETOH abuse, malnutrition who was [place] [facility] [place] for evaluation of mental status change worsening over the past two days, LKW 11/17 evening. In the ER he was also noted to be in Afib with RVR, initiated on Cardizem infusion and subsequently admitted to SDU with CIWA monitoring. Today, he was noted to be increasingly agitated/confused with CIWA 14-15, Afib with HR 90-120s, controlled with IV lopressor scheduled. Transfer to ICU for further management.n    CTH negative for acute process, scattered white matter hypodensities suggestive of small vessel ischemia     Interval ICU Events:  11/20: Transfer to ICU, Phenobarb IV taper with PRN, NPO status. Continue treatment with Azithro/Ceftriaxone. Monitor neuro status, no focal changes. Holding pradaxa with therapueitc Loveno per cardiology. SLP/PT/OT therapies ordered.    Medical History:  History reviewed. No pertinent past medical history.  History reviewed. No pertinent surgical history.  Medications Prior to Admission   Medication Sig Dispense Refill Last Dose/Taking    cyclobenzaprine (Flexeril) 10 mg tablet Take 1 tablet (10 mg) by mouth 3 times a day as needed.   Taking As Needed    dabigatran etexilate (Pradaxa) 150 mg capsule Take 1 capsule (150 mg) by mouth.   Taking    gabapentin (Neurontin) 400 mg capsule Take 1 capsule (400 mg) by mouth.   Taking     metoprolol tartrate (Lopressor) 25 mg tablet Take 0.5 tablets (12.5 mg) by mouth.   Taking     Oxycodone  Social History     Tobacco Use    Smoking status: Unknown   Substance Use Topics    Alcohol use: Yes     Alcohol/week: 12.0 standard drinks of alcohol     Types: 12 Cans of beer per week    Drug use: Not Currently     No family history on file.    Review of Systems:  14 point review of systems was completed and negative except for those specially mention in my HPI    Physical Exam:    Heart Rate:  []   Temp:  [36 °C (96.8 °F)-37.9 °C (100.2 °F)]   Resp:  [16-32]   BP: (126-210)/()   SpO2:  [91 %-100 %]     Physical Exam  Vitals and nursing note reviewed.   Constitutional:       Appearance: He is underweight. He is ill-appearing and diaphoretic.   HENT:      Mouth/Throat:      Pharynx: Oropharynx is clear.   Eyes:      Pupils: Pupils are equal, round, and reactive to light.   Cardiovascular:      Rate and Rhythm: Regular rhythm. Tachycardia present.      Pulses: Normal pulses.   Pulmonary:      Effort: Pulmonary effort is normal.      Breath sounds: Normal breath sounds.   Abdominal:      General: Abdomen is flat. There is no distension.      Palpations: Abdomen is soft.      Tenderness: There is no abdominal tenderness.   Musculoskeletal:         General: Normal range of motion.      Right lower leg: No edema.      Left lower leg: No edema.   Skin:     General: Skin is warm.      Capillary Refill: Capillary refill takes less than 2 seconds.   Neurological:      General: No focal deficit present.      Mental Status: He is disoriented.   Psychiatric:         Judgment: Judgment is impulsive.         Objective:    I have reviewed all medications, laboratory results, and imaging pertinent for today's encounter    Assessment/Plan:    I am currently managing this critically ill patient for the following problems:    Neuro/Psych/Pain Ctrl/Sedation:  Alcohol withdrawal drinks 3-4 beers daily, Ethanol level  <10 on admission  Metabolic encephalopathy  THC use medical marijuana gummies HS  Phenobarb taper with PRN  Thiamine/MV/folic acid  CAM ICU, CIWA scoring  Ammonia normal    Respiratory/ENT:  Pulmonary nodules s/p bronch[facility]biopsy with [facility] 9/2024  Current nicotine smoker  No O2 needs  Maintain SpO2 >92%  Nicotine patch if needed    Cardiovascular:  Afib RVR newly diagnosed Afib s/p Covid in 9/2024  Pradaxa on hold NPO, Lovenox Q12  Cardizem infusion stopped 11/19  IV Labetolol 20mg once now  PRN Hydralazine/Labetolol with parameters  Repeat TTE  Cardiology consulted    GI:  Severe protein calorie malnutrition  ?Dysphagia  SLP eval  NPO    Renal/Volume Status (Intra & Extravascular):  YOLY  Hypokalemia, hypomagnesemia  IVF bolus today  Replete electrolytes  Daily CMP, Mg  Monitor UOP    Endocrine  TSH, A1C pending  POCT Q6 while NPO  Hypoglycemia precautions    Infectious Disease:  CAP  SIRS  CXR- bilateral opacities  Continue Azithro, Ceftriaxone  UA negative  Antigens pending  BC pending    Heme/Onc:  Record request from [facility] for bronchoscopy results  Daily CBC    MSK:  Uses walker at baseline  PT/OT    Ethics/Code Status:  Full Code    :  DVT Prophylaxis: Lovenox  GI Prophylaxis: None  Bowel Regimen: Miralax PRN  Diet: NPO  CVC: None  Waddell: None  Ivory: None  Restraints: BSW  Dispo: ICU    Critical Care Time:  45 minutes spent in preparing to see patient (I.e. review of medical records), evaluation of diagnostics (I.e. labs, imaging, etc.), documentation, discussing plan of care with patient/ family/ caregiver, and/ or coordination of care with multidisciplinary team. Time does not include completion of procedure time.      Sandi Elizabeth, APRN-CNP

## 2024-11-20 NOTE — NURSING NOTE
RRT rounding on CIWA  pt with elevated deterioration index score    UA pt found supine in bed responsive to painful stimuli Per Night shift RN pt combative and being given Ativan for elevate CIWA,  Pt had suffered skin tears over night pt in Soft restraints.    Blood pressure elevated Night shift Dr aware of CIWA/Blood Pressure and Mental status  Spoke with Day Shift on coming RAMA Pittman who had pt yesterday and advised pt would most likely need transfer to ICU for continued care.    Pt initial admission for Afib with RVR  heart rate controled and pt now off of Cardizem drip,    Information for pt given to ICU CHONG Junior for possible need for ICU transfer    Decision made to transfer pt to SICU 14 for continued care

## 2024-11-20 NOTE — PROGRESS NOTES
Medical Group Progress Note  ASSESSMENT & PLAN:      Metabolic encephalopathy  -Etiology unclear  -Patient has received Ativan and Zyprexa for agitation  -Patient not alert or oriented upon exam this morning, increased WOB and tachypnea present  -Transferred to ICU for further medical management    Community-acquired pneumonia  -CXR 11/19 reveals retrocardiac airspace opacity   -WBC 13.7 increased from 10 yesterday  -Continue ceftriaxone and azithromycin    Chronic malnourishment  -nutrition consulted    Alcohol Use Disorder  -Patient's wife states he drinks 2-3 beers daily, last drink was Saturday 11/16  -CIWA protocol    Paroxysmal atrial fibrillation  -Continue metoprolol 5mg IV Q6hrs  -Continue lovenox subQ  -Cardiology consulted    Lung nodule  -Bronch and [facility]24 at [facility]  -Awaiting contact regarding medical records from [facility]     Hydraadenitis suppurutiva  -continue to monitor    VTE Prophylaxis: Enoxaparin subcutaneous    This note is for educational purposes. Please see attending doctor's note.  Nikki Kumar    SUBJECTIVE     Patient was seen and examined at bedside this morning. He is awake but not following commands, unchanged from yesterday, but with increased work of breathing.     Patient being transferred to ICU. Wife not present this morning, but RN states she will update her.    OBJECTIVE:     Last Recorded Vitals:  Vitals:    11/20/24 0945 11/20/24 1000 11/20/24 1100 11/20/24 1200   BP: (!) 155/92 (!) 165/101 (!) 182/116 (!) 185/101   BP Location: Right arm Right arm     Patient Position: Lying Lying     Pulse: 104 102 108 104   Resp: (!) 35 (!) 33 (!) 30 (!) 30   Temp: 36.1 °C (97 °F)      TempSrc: Temporal      SpO2: 92% 92% (!) 84% 91%   Weight: 54.5 kg (120 lb 2.4 oz)      Height:         Last I/O:  I/O last 3 completed shifts:  In: - (0 mL/kg)   Out: 1200 (22 mL/kg) [Urine:1200 (0.6 mL/kg/hr)]  Weight: 54.4 kg     Physical Exam  Constitutional:       General: He is in  acute distress.      Appearance: He is cachectic. He is ill-appearing.      Interventions: He is restrained.   Cardiovascular:      Rate and Rhythm: Tachycardia present. Rhythm irregular.   Pulmonary:      Effort: Tachypnea (38/min.) and respiratory distress present.   Skin:     General: Skin is dry.      Coloration: Skin is pale.   Psychiatric:         Behavior: Behavior is uncooperative.         Inpatient Medications:  azithromycin, 500 mg, intravenous, q24h  cefTRIAXone, 1 g, intravenous, q24h  [Held by provider] dabigatran etexilate, 150 mg, oral, BID  enoxaparin, 40 mg, subcutaneous, q24h  lactated Ringer's, 1,000 mL, intravenous, Once  magnesium sulfate, 4 g, intravenous, Once  [Held by provider] metoprolol tartrate, 50 mg, oral, BID  multivitamin with minerals, 1 tablet, oral, Daily  PHENobarbital, 65 mg, intravenous, q8h   Followed by  [START ON 11/22/2024] PHENobarbital, 32.5 mg, intravenous, q8h  [START ON 11/23/2024] thiamine, 100 mg, oral, Daily  thiamine, 100 mg, intravenous, Daily    PRN Medications  PRN medications: acetaminophen **OR** acetaminophen **OR** acetaminophen, hydrALAZINE, labetaloL, PHENobarbital, polyethylene glycol  Continuous Medications:     LABS AND IMAGING:     Labs:  Results from last 7 days   Lab Units 11/20/24  0524 11/19/24  0315   WBC AUTO x10*3/uL 13.7* 10.0   RBC AUTO x10*6/uL 4.88 5.52   HEMOGLOBIN g/dL 15.1 17.5   HEMATOCRIT % 46.2 53.5*   MCV fL 95 97   MCH pg 30.9 31.7   MCHC g/dL 32.7 32.7   RDW % 14.1 14.2   PLATELETS AUTO x10*3/uL 378 377     Results from last 7 days   Lab Units 11/20/24  0524 11/19/24  0343   SODIUM mmol/L 135* 132*   POTASSIUM mmol/L 3.1* 3.9   CHLORIDE mmol/L 97* 92*   CO2 mmol/L 23 21   BUN mg/dL 15 15   CREATININE mg/dL 1.43* 1.19   GLUCOSE mg/dL 140* 71*   PROTEIN TOTAL g/dL  --  9.0*   CALCIUM mg/dL 9.5 9.9   BILIRUBIN TOTAL mg/dL  --  1.2   ALK PHOS U/L  --  88   AST U/L  --  19   ALT U/L  --  5*     Results from last 7 days   Lab Units  11/20/24  0524   MAGNESIUM mg/dL 1.51*     Results from last 7 days   Lab Units 11/20/24  0524 11/19/24  0343   TROPHS ng/L 32* 10     Imaging:  ECG 12 Lead  Atrial fibrillation with premature ventricular or aberrantly conducted complexes  Septal infarct (cited on or before 09-MAY-2020)  Abnormal ECG  When compared with ECG of 19-NOV-2024 06:55,  T wave inversion no longer evident in Inferior leads  T wave inversion no longer evident in Lateral leads  Confirmed by Isac Pereira ([id number]) on 11/20/2024 12:05:30 PM

## 2024-11-20 NOTE — CONSULTS
Cardiology Consult Note      Date:   11/20/2024  Patient name:  Ivan Bravo  Date of admission:  11/19/2024  2:50 AM  MRN:   [id number]  YOB: 1955  Time of Consult:  8:08 AM    Consulting Cardiologist: Dr. Ricardo Barajas, APRN, CNP  Primary Cardiologist:  Dr. Ricardo Luna    Referring Provider: Dr Brown      Admission Diagnosis:     Atrial fibrillation with RVR (Multi)      History of Present Illness:      Ivan Bravo is a 69 y.o.  male patient who is being at the request of Dr. Brown for inpatient consultation of  atrial fibrillation with RVR . He was admitted on 11/19/2024.  Previous [facility] and [facility] records have been reviewed in detail.    Patient has a history of PAF, hypertension, substance abuse  Patient was brought in by his wife due to change in his behavior.  On Saturday and Sunday he was normal and then however on Monday become less responsive not acting like himself brought into the emergency department he was not alert but he was very restless they did a chest x-ray showed that he had pneumonia found to be in A-fib with RVR so decided to admit him.  He does have a longstanding history of atrial fibrillation where he was taking Lopressor and Pradaxa.  This morning they did call cardiology and his heart rates in the 100s but he is only responds to deep painful stimuli.  He is currently in restraints being sedated and they plan to transfer to the ICU at this present time.  Lungs are very diminished with bilateral rhonchi.  He is unable to answer any questions majority of this information is received from the nurse and from the chart.  Per the staff he drinks at least 3-4 beers per day.    EKG A-fib rate in the 90s    Magnesium 1.51  CK 40  Alcohol level less than 10  Troponin 10    Cardiac history  5/11/2020  CONCLUSIONS:   1. The left ventricular systolic function is normal with a 60% estimated ejection fraction.   2. There is mitral stenosis is not seen.    3. Aortic valve stenosis is not present.         Allergies:     Allergies   Allergen Reactions    Oxycodone Hives         Past Medical History:   Hypertension  PAF  History reviewed. No pertinent past medical history.    Past Surgical History:     History reviewed. No pertinent surgical history.    Family History:     No family history on file.    Social History:     Social History     Tobacco Use    Smoking status: Unknown   Substance Use Topics    Alcohol use: Yes     Alcohol/week: 12.0 standard drinks of alcohol     Types: 12 Cans of beer per week    Drug use: Not Currently       CURRENT INPATIENT MEDICATIONS    azithromycin, 500 mg, intravenous, q24h  cefTRIAXone, 1 g, intravenous, q24h  [Held by provider] dabigatran etexilate, 150 mg, oral, BID  enoxaparin, 1 mg/kg, subcutaneous, q12h  folic acid, 1 mg, oral, Daily  magnesium sulfate, 4 g, intravenous, Once  metoprolol, 5 mg, intravenous, q4h  [Held by provider] metoprolol tartrate, 50 mg, oral, BID  multivitamin with minerals, 1 tablet, oral, Daily  potassium chloride, 20 mEq, intravenous, Once  sodium chloride, 500 mL, intravenous, Once  [START ON 11/22/2024] thiamine, 100 mg, oral, Daily  thiamine, 100 mg, intravenous, Daily         Current Outpatient Medications   Medication Instructions    cyclobenzaprine (FLEXERIL) 10 mg, 3 times daily PRN    dabigatran etexilate (PRADAXA) 150 mg    gabapentin (NEURONTIN) 400 mg    metoprolol tartrate (LOPRESSOR) 12.5 mg        Review of Systems:      12 point review of systems was obtained in detail and is negative other than that detailed above.    Vital Signs:     Vitals:    11/20/24 0424 11/20/24 0502 11/20/24 0634 11/20/24 0736   BP: (!) 210/122 (!) 170/98 (!) 193/92 (!) 152/91   BP Location:    Left arm   Patient Position:    Lying   Pulse:   104 (!) 119   Resp:    22   Temp: 37.6 °C (99.7 °F)   37.1 °C (98.8 °F)   TempSrc:    Temporal   SpO2: 93%   100%   Weight:       Height:           Intake/Output Summary (Last  24 hours) at 11/20/2024 0808  Last data filed at 11/19/2024 1804  Gross per 24 hour   Intake --   Output 1200 ml   Net -1200 ml       Wt Readings from Last 4 Encounters:   11/19/24 54.4 kg (120 lb)       Physical Examination:     GENERAL APPEARANCE: Ill appearance  CHEST: Symmetric and non-tender.  INTEGUMENT: Skin warm and dry, without gross excoriationis or lesions.  HEENT: No gross abnormalities of conjunctiva, teeth, gums, oral mucosa  NECK: Supple, no JVD, no bruit. Thyroid not palpable. Carotid upstrokes normal.  NEURO/PSHCY: Responds to painful stimuli  LUNGS: Bilateral rhonchi  HEART: S1, S2 irregular  ABDOMEN: Soft, nontender, no palpable hepatosplenomegaly, no mases, no bruits. Abdominal aorta not noted to be enlarged.  EXTREMITIES: Warm with good color, no clubbing or cyanois. There is no edema noted.  PERIPHERAL VASCULAR: Pulses present and equally palpable; 1+ throughout. No femoral bruits.      Lab:     CBC:   Results from last 7 days   Lab Units 11/20/24  0524 11/19/24  0315   WBC AUTO x10*3/uL 13.7* 10.0   RBC AUTO x10*6/uL 4.88 5.52   HEMOGLOBIN g/dL 15.1 17.5   HEMATOCRIT % 46.2 53.5*   MCV fL 95 97   MCH pg 30.9 31.7   MCHC g/dL 32.7 32.7   RDW % 14.1 14.2   PLATELETS AUTO x10*3/uL 378 377     CMP:    Results from last 7 days   Lab Units 11/20/24  0524 11/19/24  0343   SODIUM mmol/L 135* 132*   POTASSIUM mmol/L 3.1* 3.9   CHLORIDE mmol/L 97* 92*   CO2 mmol/L 23 21   BUN mg/dL 15 15   CREATININE mg/dL 1.43* 1.19   GLUCOSE mg/dL 140* 71*   PROTEIN TOTAL g/dL  --  9.0*   CALCIUM mg/dL 9.5 9.9   BILIRUBIN TOTAL mg/dL  --  1.2   ALK PHOS U/L  --  88   AST U/L  --  19   ALT U/L  --  5*     BMP:    Results from last 7 days   Lab Units 11/20/24  0524 11/19/24  0343   SODIUM mmol/L 135* 132*   POTASSIUM mmol/L 3.1* 3.9   CHLORIDE mmol/L 97* 92*   CO2 mmol/L 23 21   BUN mg/dL 15 15   CREATININE mg/dL 1.43* 1.19   CALCIUM mg/dL 9.5 9.9   GLUCOSE mg/dL 140* 71*     Magnesium:  Results from last 7 days   Lab  Units 11/20/24  0524   MAGNESIUM mg/dL 1.51*     Troponin:    Results from last 7 days   Lab Units 11/19/24  0343   TROPHS ng/L 10     BNP:   Results from last 7 days   Lab Units 11/19/24  0315   BNP pg/mL 278*     Lipid Panel:         Diagnostic Studies:   @No results found for this or any previous visit.    ECG 12 lead    Result Date: 11/19/2024  Atrial fibrillation Rightward axis Anteroseptal infarct (cited on or before 09-MAY-2020) Marked ST abnormality, possible inferior subendocardial injury Abnormal ECG When compared with ECG of 19-NOV-2024 06:02, (unconfirmed) No significant change was found See ED provider note for full interpretation and clinical correlation Confirmed by Leslee Del Angel ([id number]) on 11/19/2024 11:04:23 AM    ECG 12 lead    Result Date: 11/19/2024  Atrial fibrillation with rapid ventricular response with premature ventricular or aberrantly conducted complexes Anteroseptal infarct (cited on or before 09-MAY-2020) Marked ST abnormality, possible inferior subendocardial injury Abnormal ECG When compared with ECG of 09-MAY-2020 09:47, ST more depressed in Inferior leads Inverted T waves have replaced nonspecific T wave abnormality in Inferior leads T wave inversion now evident in Lateral leads See ED provider note for full interpretation and clinical correlation Confirmed by Leslee Del Angel ([id number]) on 11/19/2024 11:03:59 AM    ECG 12 lead    Result Date: 11/19/2024  Atrial fibrillation with rapid ventricular response Rightward axis Anteroseptal infarct (cited on or before 09-MAY-2020) Marked ST abnormality, possible inferior subendocardial injury Abnormal ECG When compared with ECG of 19-NOV-2024 05:59, (unconfirmed) No significant change was found See ED provider note for full interpretation and clinical correlation Confirmed by Leslee Del Angel ([id number]) on 11/19/2024 11:03:40 AM    XR chest 1 view    Result Date: 11/19/2024  Interpreted By:  Finkelstein, Evan, STUDY: XR CHEST 1 VIEW;   11/19/2024 5:24 am   INDICATION: Signs/Symptoms:aspiration.     COMPARISON: Chest radiograph 05/09/2020   ACCESSION NUMBER(S): [id number]   ORDERING CLINICIAN: PILAR EASLEY   FINDINGS: Median sternotomy wires are present.   CARDIOMEDIASTINAL SILHOUETTE: Cardiomediastinal silhouette is stable in size and configuration.   LUNGS: Retrocardiac airspace opacity. Skin folds overlie the right hemithorax. No sizable pleural effusion or pneumothorax.   ABDOMEN: No remarkable upper abdominal findings.   BONES: No acute osseous abnormality.       Retrocardiac airspace opacity which may represent atelectasis or pneumonia/aspiration in the appropriate clinical setting.   MACRO: None.   Signed by: Evan Finkelstein 11/19/2024 6:21 AM Dictation workstation:   [id number]    CT head wo IV contrast    Result Date: 11/19/2024  Interpreted By:  Finkelstein, Evan, STUDY: CT HEAD WO IV CONTRAST;  11/19/2024 5:23 am   INDICATION: Signs/Symptoms:ams.     COMPARISON: CT brain 05/12/2020   ACCESSION NUMBER(S): [id number]   ORDERING CLINICIAN: PILAR EASLEY   TECHNIQUE: Axial noncontrast CT images of the head with coronal and sagittal reconstructions.   FINDINGS: EXTRACRANIAL SOFT TISSUES: Unremarkable.   CALVARIUM: No depressed skull fracture. No destructive osseous lesion.   PARANASAL SINUSES/MASTOIDS: The visualized paranasal sinuses and mastoid air cells are aerated.   HEMORRHAGE: No acute intracranial hemorrhage.   BRAIN PARENCHYMA: Gray-white matter interfaces are preserved. No mass effect or midline shift. There are nonspecific scattered white matter hypodensities.   VENTRICLES and EXTRA-AXIAL SPACES: Parenchymal atrophy with prominence of the ventricles and cortical sulci.   OTHER FINDINGS: There are calcifications within the cavernous carotids       No acute intracranial hemorrhage, mass effect or midline shift.   Nonspecific scattered white matter hypodensities favored to represent sequela of small vessel ischemia.      MACRO: None.   Signed by: Evan Finkelstein 11/19/2024 5:31 AM Dictation workstation:   [id number]      No echocardiogram results found for the past 14 days    Radiology:     XR chest 1 view   Final Result   Retrocardiac airspace opacity which may represent atelectasis or   pneumonia/aspiration in the appropriate clinical setting.        MACRO:   None.        Signed by: Evan Finkelstein 11/19/2024 6:21 AM   Dictation workstation:   [id number]      CT head wo IV contrast   Final Result   No acute intracranial hemorrhage, mass effect or midline shift.        Nonspecific scattered white matter hypodensities favored to represent   sequela of small vessel ischemia.             MACRO:   None.        Signed by: Evan Finkelstein 11/19/2024 5:31 AM   Dictation workstation:   [id number]      Transthoracic Echo (TTE) Complete    (Results Pending)       Problem List:     Patient Active Problem List   Diagnosis    Atrial fibrillation with RVR (Multi)       Assessment:   Metabolic encephalopathy  A-fib with RVR  Hypertension  Substance abuse      Plan:   Transfer to ICU  Serial enzymes  2d echo  Daily EKG's  Hold the Pradaxa for now Lovenox 1 mg/kg every 12 hours  Lopressor 5 mg IV push every 4 hours  Normal saline 500 cc bolus  Keep magnesium greater than 2.0  Keep potassium 4.0-4.5  Intensivist input thank you  Further recommendations per Dr Jeremy Barajas CNP  [facility]      Of note, this documentation is completed using the Dragon Dictation system (voice recognition software). There may be spelling and/or grammatical errors that were not corrected prior to final submission.      Electronically signed by NATHALIE Yap-CNP, on 11/20/2024 at 8:08 AM    Patient seen and examined in conjunction with NATHALIE Beckwith and agree with the evaluation as noted above.    I have personally interviewed and examined the patient.   I have personally and  independently reviewed labs and diagnostic testing.  I have personally verified the elements of the history and physical listed above and changes, if any, are noted.     69-year-old with a history of paroxysmal atrial fibrillation, hypertension substance abuse was brought in by his wife because of increased unresponsiveness and he was not acting like himself.  He has a long standing history of atrial fibrillation for which she was previously on Lopressor and Pradaxa for anticoagulation.  It appears that the Lopressor was recently discontinued at the [facility].  He also has a significant history of alcohol abuse and appears to be in active alcohol withdrawal so he was transferred to the ICU.    Agree with examination as noted above.  Patient is sedated but arousable, with significant mouth breathing.  Cardiac exam reveals irregularly irregular S1-S2, with no murmurs appreciated.  Chest reveals reduced breath sounds at the lung bases bilaterally.    ASSESSMENT AND PLAN:  1.  Persistent atrial fibrillation: Appears multifactorial, in setting of significant EtOH abuse and withdrawal.  The rate appears improved on current IV Lopressor.  Agree with plans for continued IV hydration and supportive treatment for early alcohol withdrawal.  Will continue current anticoagulation strategy with Lovenox.  Will get a 2D echocardiogram for further evaluation.  We will not pursue cardioversion at this time due to active ongoing with the lower, but consider elective cardioversion when patient is more stable.  2.  Active substance abuse: Agree with current supportive treatment per ICU team.

## 2024-11-20 NOTE — PROGRESS NOTES
Medical Group Progress Note  ASSESSMENT & PLAN:     Metabolic encephalopathy  - Baseline patient is alert and oriented x 3 with no cognitive issues  - Likely in setting of pneumonia, chronic malnourished state, alcohol withdrawal  - On my exam not alert nor oriented, unable to participate in exam, less restless after receiving zyprexa     Paroxysmal atrial fibrillation   Patient with RVR, resolved  Long-term anticoagulation use  - Increased home metoprolol from 12.5 mg twice daily to 50 mg twice daily - however unable to tolerate PO therefore continuing metoprolol 5mg IV Q6hrs scheduled until able to tolerate PO  - Lovenox full dose subcutaneous twice daily and resume Pradaxa once able to tolerate oral intake  - Cardiology consulted     Community-acquired bacterial pneumonia  - Continue ceftriaxone and azithromycin  - Follow-up strep pneumo antigen, Legionella urine antigen     Chronic severe protein calorie malnutrition  - Dietitian consult for confirmation of diagnosis and education     Alcohol use disorder  - Consumes 2-3 beers per day with last drink being on 11/16  - CIWA protocol with Ativan     Lung nodule  - [facility], medical records request sent for results     Hydraadenitis suppurutiva  - Has chronic skin changes in the axilla and sacral/buttock area (refer to media section for images from my exam on 11/19)    VTE Prophylaxis: Enoxaparin subcutaneous    Disposition/Daily update: Patient through the night had worsening CIWA with restlessness and agitation.  Received Zyprexa this morning and was more restful.  Spoke with ICU at the request of nursing staff and patient has been accepted for transfer to the ICU at this time.      ---Of note, this documentation is completed using the Dragon Dictation system (voice recognition software). There may be spelling and/or grammatical errors that were not corrected prior to final submission.---    Kevin Brown,  MD BALDERRAMA     Patient was seen and examined bedside this morning.  Overnight, patient was very restless and still unable to follow commands.  She was however trending in the high teens.    Spoke with ICU this morning and patient has been accepted for transfer.    OBJECTIVE:     Last Recorded Vitals:  Vitals:    11/20/24 0502 11/20/24 0634 11/20/24 0736 11/20/24 0945   BP: (!) 170/98 (!) 193/92 (!) 152/91 (!) 155/92   BP Location:   Left arm Right arm   Patient Position:   Lying Lying   Pulse:  104 (!) 119 104   Resp:   22 (!) 35   Temp:   37.1 °C (98.8 °F) 36.1 °C (97 °F)   TempSrc:   Temporal Temporal   SpO2:   100% 92%   Weight:    54.5 kg (120 lb 2.4 oz)   Height:         Last I/O:  I/O last 3 completed shifts:  In: - (0 mL/kg)   Out: 1200 (22 mL/kg) [Urine:1200 (0.6 mL/kg/hr)]  Weight: 54.4 kg     Physical Exam  Vitals reviewed.   Constitutional:       General: He is not in acute distress.     Appearance: He is ill-appearing.      Comments: Very frail in appearance.  Not alert and oriented.  Did receive Zyprexa prior to my exam however.   HENT:      Head: Normocephalic and atraumatic.   Cardiovascular:      Rate and Rhythm: Tachycardia present. Rhythm irregular.      Pulses: Normal pulses.      Heart sounds: Normal heart sounds.   Pulmonary:      Effort: Pulmonary effort is normal.      Breath sounds: Normal breath sounds.   Abdominal:      General: Bowel sounds are normal. There is no distension.      Palpations: Abdomen is soft.      Tenderness: There is no abdominal tenderness. There is no guarding.   Musculoskeletal:      Cervical back: Normal range of motion and neck supple.      Comments: Spontaneous range of motion of the upper extremities, limited however with restraints.   Neurological:      Mental Status: He is disoriented.      Comments: Unable to obtain neurological exam as patient received Zyprexa IM.     Inpatient Medications:  azithromycin, 500 mg, intravenous, q24h  cefTRIAXone, 1 g,  intravenous, q24h  [Held by provider] dabigatran etexilate, 150 mg, oral, BID  enoxaparin, 40 mg, subcutaneous, q24h  magnesium sulfate, 4 g, intravenous, Once  metoprolol, 5 mg, intravenous, q4h  [Held by provider] metoprolol tartrate, 50 mg, oral, BID  multivitamin with minerals, 1 tablet, oral, Daily  PHENobarbital, 65 mg, intravenous, q8h   Followed by  [START ON 11/22/2024] PHENobarbital, 32.5 mg, intravenous, q8h  potassium chloride, 20 mEq, intravenous, Once  [START ON 11/23/2024] thiamine, 100 mg, oral, Daily  thiamine, 100 mg, intravenous, Daily    PRN Medications  PRN medications: acetaminophen **OR** acetaminophen **OR** acetaminophen, PHENobarbital, polyethylene glycol  Continuous Medications:     LABS AND IMAGING:     Labs:  Results from last 7 days   Lab Units 11/20/24  0524 11/19/24  0315   WBC AUTO x10*3/uL 13.7* 10.0   RBC AUTO x10*6/uL 4.88 5.52   HEMOGLOBIN g/dL 15.1 17.5   HEMATOCRIT % 46.2 53.5*   MCV fL 95 97   MCH pg 30.9 31.7   MCHC g/dL 32.7 32.7   RDW % 14.1 14.2   PLATELETS AUTO x10*3/uL 378 377     Results from last 7 days   Lab Units 11/20/24  0524 11/19/24  0343   SODIUM mmol/L 135* 132*   POTASSIUM mmol/L 3.1* 3.9   CHLORIDE mmol/L 97* 92*   CO2 mmol/L 23 21   BUN mg/dL 15 15   CREATININE mg/dL 1.43* 1.19   GLUCOSE mg/dL 140* 71*   PROTEIN TOTAL g/dL  --  9.0*   CALCIUM mg/dL 9.5 9.9   BILIRUBIN TOTAL mg/dL  --  1.2   ALK PHOS U/L  --  88   AST U/L  --  19   ALT U/L  --  5*     Results from last 7 days   Lab Units 11/20/24 0524   MAGNESIUM mg/dL 1.51*     Results from last 7 days   Lab Units 11/20/24  0524 11/19/24  0343   TROPHS ng/L 32* 10     Imaging:  ECG 12 Lead  Atrial fibrillation with premature ventricular or aberrantly conducted complexes  Septal infarct (cited on or before 09-MAY-2020)  Abnormal ECG  When compared with ECG of 19-NOV-2024 06:55,  T wave inversion no longer evident in Inferior leads  T wave inversion no longer evident in Lateral leads

## 2024-11-20 NOTE — SIGNIFICANT EVENT
2015 RRT to pt bedside for report of elevated CIWA requesting assessment of appropriateness of pt for intermediate care floor.    Pt non verbal, groans and writhes frequently if not constantly. Skin tears to right upper extremity. Rails have been padded soft limb restraints to which we added mitts as well.   Afib, hr low 100s (ranges from low 90s to 110s) bp adequate, sonorous respirations. Pt is tachypnic but not for hypoxic or metabolic etiology, appears to be hepatic or withdrawal irritability. I witnessed no obstruction, just dry sticky oral cavity from rate and depth of his respirations.  He appears stable at this time for this disposition.   RRT will check back periodically to observe.   Please call if situation escalates.  0200 RRT following up, pt remains tachypneic, repositioned and pulled up in bed with primary RN Daina. Pt groaning but not speaking with pad change. HR 90s to 120s moment to moment.

## 2024-11-20 NOTE — CARE PLAN
The patient's goals for the shift include pt unabl to answer    The clinical goals for the shift include pt will remain free from injury throughout shift

## 2024-11-20 NOTE — PROGRESS NOTES
Speech-Language Pathology                 Therapy Communication Note    Patient Name: Ivan Bravo  MRN: [id number]  Department: [facility]  Room: 14/14-A  Today's Date: 11/20/2024     Discipline: Speech Language Pathology    Missed Visit Reason:  Order for evaluation/treatment received and reviewed. Pt transferred from [facility] to SICU. Pt lethargic, not able to wake to appropriate level for PO intake, not able to follow directions. Pt to remain NPO until status improves. Speech therapy to re-attempt at later time as pt appropriate.      Missed Time: Attempt

## 2024-11-21 ENCOUNTER — APPOINTMENT (OUTPATIENT)
Dept: CARDIOLOGY | Facility: HOSPITAL | Age: 69
DRG: 177 | End: 2024-11-21
Payer: MEDICARE

## 2024-11-21 LAB
ALBUMIN SERPL BCP-MCNC: 3.9 G/DL (ref 3.4–5)
ALP SERPL-CCNC: 74 U/L (ref 33–136)
ALT SERPL W P-5'-P-CCNC: 7 U/L (ref 10–52)
ANION GAP SERPL CALC-SCNC: 17 MMOL/L (ref 10–20)
AST SERPL W P-5'-P-CCNC: 28 U/L (ref 9–39)
BASOPHILS # BLD AUTO: 0.06 X10*3/UL (ref 0–0.1)
BASOPHILS NFR BLD AUTO: 0.5 %
BILIRUB SERPL-MCNC: 0.8 MG/DL (ref 0–1.2)
BUN SERPL-MCNC: 10 MG/DL (ref 6–23)
CALCIUM SERPL-MCNC: 9.6 MG/DL (ref 8.6–10.3)
CHLORIDE SERPL-SCNC: 96 MMOL/L (ref 98–107)
CO2 SERPL-SCNC: 27 MMOL/L (ref 21–32)
CREAT SERPL-MCNC: 1.02 MG/DL (ref 0.5–1.3)
EGFRCR SERPLBLD CKD-EPI 2021: 79 ML/MIN/1.73M*2
EOSINOPHIL # BLD AUTO: 0.33 X10*3/UL (ref 0–0.4)
EOSINOPHIL NFR BLD AUTO: 3 %
ERYTHROCYTE [DISTWIDTH] IN BLOOD BY AUTOMATED COUNT: 14.1 % (ref 11.5–14.5)
GLUCOSE BLD MANUAL STRIP-MCNC: 120 MG/DL (ref 74–99)
GLUCOSE BLD MANUAL STRIP-MCNC: 123 MG/DL (ref 74–99)
GLUCOSE BLD MANUAL STRIP-MCNC: 79 MG/DL (ref 74–99)
GLUCOSE BLD MANUAL STRIP-MCNC: 84 MG/DL (ref 74–99)
GLUCOSE BLD MANUAL STRIP-MCNC: 94 MG/DL (ref 74–99)
GLUCOSE SERPL-MCNC: 97 MG/DL (ref 74–99)
HCT VFR BLD AUTO: 54.1 % (ref 41–52)
HGB BLD-MCNC: 17.6 G/DL (ref 13.5–17.5)
IMM GRANULOCYTES # BLD AUTO: 0.05 X10*3/UL (ref 0–0.5)
IMM GRANULOCYTES NFR BLD AUTO: 0.4 % (ref 0–0.9)
LYMPHOCYTES # BLD AUTO: 1.35 X10*3/UL (ref 0.8–3)
LYMPHOCYTES NFR BLD AUTO: 12.1 %
MAGNESIUM SERPL-MCNC: 2.15 MG/DL (ref 1.6–2.4)
MCH RBC QN AUTO: 31.1 PG (ref 26–34)
MCHC RBC AUTO-ENTMCNC: 32.5 G/DL (ref 32–36)
MCV RBC AUTO: 96 FL (ref 80–100)
MONOCYTES # BLD AUTO: 1.26 X10*3/UL (ref 0.05–0.8)
MONOCYTES NFR BLD AUTO: 11.3 %
NEUTROPHILS # BLD AUTO: 8.07 X10*3/UL (ref 1.6–5.5)
NEUTROPHILS NFR BLD AUTO: 72.7 %
NRBC BLD-RTO: 0 /100 WBCS (ref 0–0)
PHOSPHATE SERPL-MCNC: 2.5 MG/DL (ref 2.5–4.9)
PLATELET # BLD AUTO: 259 X10*3/UL (ref 150–450)
POTASSIUM SERPL-SCNC: 3.5 MMOL/L (ref 3.5–5.3)
PROT SERPL-MCNC: 8 G/DL (ref 6.4–8.2)
RBC # BLD AUTO: 5.66 X10*6/UL (ref 4.5–5.9)
SODIUM SERPL-SCNC: 136 MMOL/L (ref 136–145)
WBC # BLD AUTO: 11.1 X10*3/UL (ref 4.4–11.3)

## 2024-11-21 PROCEDURE — 84100 ASSAY OF PHOSPHORUS: CPT | Performed by: NURSE PRACTITIONER

## 2024-11-21 PROCEDURE — 36415 COLL VENOUS BLD VENIPUNCTURE: CPT | Performed by: NURSE PRACTITIONER

## 2024-11-21 PROCEDURE — 92610 EVALUATE SWALLOWING FUNCTION: CPT | Mod: GN

## 2024-11-21 PROCEDURE — 82947 ASSAY GLUCOSE BLOOD QUANT: CPT

## 2024-11-21 PROCEDURE — 99291 CRITICAL CARE FIRST HOUR: CPT

## 2024-11-21 PROCEDURE — 97161 PT EVAL LOW COMPLEX 20 MIN: CPT | Mod: GP

## 2024-11-21 PROCEDURE — 2020000001 HC ICU ROOM DAILY

## 2024-11-21 PROCEDURE — 99233 SBSQ HOSP IP/OBS HIGH 50: CPT | Performed by: NURSE PRACTITIONER

## 2024-11-21 PROCEDURE — 93010 ELECTROCARDIOGRAM REPORT: CPT | Performed by: INTERNAL MEDICINE

## 2024-11-21 PROCEDURE — 2500000004 HC RX 250 GENERAL PHARMACY W/ HCPCS (ALT 636 FOR OP/ED): Performed by: NURSE PRACTITIONER

## 2024-11-21 PROCEDURE — 2500000004 HC RX 250 GENERAL PHARMACY W/ HCPCS (ALT 636 FOR OP/ED)

## 2024-11-21 PROCEDURE — 93005 ELECTROCARDIOGRAM TRACING: CPT

## 2024-11-21 PROCEDURE — 85025 COMPLETE CBC W/AUTO DIFF WBC: CPT | Performed by: NURSE PRACTITIONER

## 2024-11-21 PROCEDURE — 83735 ASSAY OF MAGNESIUM: CPT | Performed by: NURSE PRACTITIONER

## 2024-11-21 PROCEDURE — 80053 COMPREHEN METABOLIC PANEL: CPT | Performed by: NURSE PRACTITIONER

## 2024-11-21 PROCEDURE — 97165 OT EVAL LOW COMPLEX 30 MIN: CPT | Mod: GO

## 2024-11-21 RX ORDER — POTASSIUM CHLORIDE 14.9 MG/ML
20 INJECTION INTRAVENOUS
Status: COMPLETED | OUTPATIENT
Start: 2024-11-21 | End: 2024-11-21

## 2024-11-21 RX ORDER — METOPROLOL TARTRATE 1 MG/ML
5 INJECTION, SOLUTION INTRAVENOUS EVERY 4 HOURS
Status: DISCONTINUED | OUTPATIENT
Start: 2024-11-21 | End: 2024-11-22

## 2024-11-21 RX ORDER — METOPROLOL TARTRATE 1 MG/ML
5 INJECTION, SOLUTION INTRAVENOUS EVERY 6 HOURS
Status: DISCONTINUED | OUTPATIENT
Start: 2024-11-21 | End: 2024-11-21

## 2024-11-21 RX ORDER — CEFTRIAXONE 1 G/50ML
1 INJECTION, SOLUTION INTRAVENOUS EVERY 24 HOURS
Status: COMPLETED | OUTPATIENT
Start: 2024-11-21 | End: 2024-11-23

## 2024-11-21 RX ORDER — ENOXAPARIN SODIUM 100 MG/ML
1 INJECTION SUBCUTANEOUS EVERY 12 HOURS SCHEDULED
Status: DISCONTINUED | OUTPATIENT
Start: 2024-11-21 | End: 2024-11-23

## 2024-11-21 RX ADMIN — METOPROLOL TARTRATE 5 MG: 5 INJECTION INTRAVENOUS at 13:50

## 2024-11-21 RX ADMIN — POTASSIUM CHLORIDE 20 MEQ: 14.9 INJECTION, SOLUTION INTRAVENOUS at 06:15

## 2024-11-21 RX ADMIN — THIAMINE HYDROCHLORIDE 100 MG: 100 INJECTION, SOLUTION INTRAMUSCULAR; INTRAVENOUS at 08:41

## 2024-11-21 RX ADMIN — METOPROLOL TARTRATE 5 MG: 5 INJECTION INTRAVENOUS at 17:19

## 2024-11-21 RX ADMIN — PHENOBARBITAL SODIUM 65 MG: 65 INJECTION INTRAMUSCULAR; INTRAVENOUS at 09:52

## 2024-11-21 RX ADMIN — POTASSIUM CHLORIDE 20 MEQ: 14.9 INJECTION, SOLUTION INTRAVENOUS at 08:54

## 2024-11-21 RX ADMIN — METOPROLOL TARTRATE 5 MG: 5 INJECTION INTRAVENOUS at 20:03

## 2024-11-21 RX ADMIN — METOPROLOL TARTRATE 5 MG: 5 INJECTION INTRAVENOUS at 08:44

## 2024-11-21 RX ADMIN — HYDRALAZINE HYDROCHLORIDE 5 MG: 20 INJECTION INTRAMUSCULAR; INTRAVENOUS at 01:10

## 2024-11-21 RX ADMIN — ENOXAPARIN SODIUM 50 MG: 60 INJECTION SUBCUTANEOUS at 08:50

## 2024-11-21 RX ADMIN — HYDRALAZINE HYDROCHLORIDE 5 MG: 20 INJECTION INTRAMUSCULAR; INTRAVENOUS at 07:05

## 2024-11-21 RX ADMIN — SODIUM CHLORIDE, POTASSIUM CHLORIDE, SODIUM LACTATE AND CALCIUM CHLORIDE 500 ML: 600; 310; 30; 20 INJECTION, SOLUTION INTRAVENOUS at 13:50

## 2024-11-21 RX ADMIN — ENOXAPARIN SODIUM 50 MG: 60 INJECTION SUBCUTANEOUS at 20:03

## 2024-11-21 RX ADMIN — CEFTRIAXONE SODIUM 1 G: 1 INJECTION, SOLUTION INTRAVENOUS at 17:19

## 2024-11-21 RX ADMIN — PHENOBARBITAL SODIUM 65 MG: 65 INJECTION INTRAMUSCULAR; INTRAVENOUS at 18:24

## 2024-11-21 ASSESSMENT — LIFESTYLE VARIABLES
ANXIETY: NO ANXIETY, AT EASE
AUDITORY DISTURBANCES: NOT PRESENT
AGITATION: NORMAL ACTIVITY
PAROXYSMAL SWEATS: BARELY PERCEPTIBLE SWEATING, PALMS MOIST
NAUSEA AND VOMITING: NO NAUSEA AND NO VOMITING
ORIENTATION AND CLOUDING OF SENSORIUM: DISORIENTED FOR PLACE OR PERSON
AUDITORY DISTURBANCES: NOT PRESENT
ANXIETY: NO ANXIETY, AT EASE
NAUSEA AND VOMITING: NO NAUSEA AND NO VOMITING
TREMOR: NOT VISIBLE, BUT CAN BE FELT FINGERTIP TO FINGERTIP
TOTAL SCORE: 6
TREMOR: NO TREMOR
AGITATION: NORMAL ACTIVITY
ORIENTATION AND CLOUDING OF SENSORIUM: DISORIENTED FOR PLACE OR PERSON
HEADACHE, FULLNESS IN HEAD: NOT PRESENT
TOTAL SCORE: 5
PAROXYSMAL SWEATS: BARELY PERCEPTIBLE SWEATING, PALMS MOIST
AUDITORY DISTURBANCES: NOT PRESENT
ANXIETY: NO ANXIETY, AT EASE
TOTAL SCORE: 6
HEADACHE, FULLNESS IN HEAD: NOT PRESENT
ORIENTATION AND CLOUDING OF SENSORIUM: DISORIENTED FOR PLACE OR PERSON
VISUAL DISTURBANCES: NOT PRESENT
AGITATION: NORMAL ACTIVITY
AGITATION: NORMAL ACTIVITY
ANXIETY: NO ANXIETY, AT EASE
TOTAL SCORE: 5
VISUAL DISTURBANCES: NOT PRESENT
TREMOR: NOT VISIBLE, BUT CAN BE FELT FINGERTIP TO FINGERTIP
AUDITORY DISTURBANCES: NOT PRESENT
PAROXYSMAL SWEATS: BARELY PERCEPTIBLE SWEATING, PALMS MOIST
TREMOR: NOT VISIBLE, BUT CAN BE FELT FINGERTIP TO FINGERTIP
ORIENTATION AND CLOUDING OF SENSORIUM: DISORIENTED FOR PLACE OR PERSON
NAUSEA AND VOMITING: NO NAUSEA AND NO VOMITING
TREMOR: NOT VISIBLE, BUT CAN BE FELT FINGERTIP TO FINGERTIP
AUDITORY DISTURBANCES: NOT PRESENT
ANXIETY: NO ANXIETY, AT EASE
PAROXYSMAL SWEATS: BARELY PERCEPTIBLE SWEATING, PALMS MOIST
AUDITORY DISTURBANCES: NOT PRESENT
VISUAL DISTURBANCES: NOT PRESENT
VISUAL DISTURBANCES: NOT PRESENT
HEADACHE, FULLNESS IN HEAD: NOT PRESENT
ORIENTATION AND CLOUDING OF SENSORIUM: DISORIENTED FOR PLACE OR PERSON
HEADACHE, FULLNESS IN HEAD: NOT PRESENT
PAROXYSMAL SWEATS: BARELY PERCEPTIBLE SWEATING, PALMS MOIST
PAROXYSMAL SWEATS: BARELY PERCEPTIBLE SWEATING, PALMS MOIST
HEADACHE, FULLNESS IN HEAD: NOT PRESENT
ORIENTATION AND CLOUDING OF SENSORIUM: DISORIENTED FOR PLACE OR PERSON
NAUSEA AND VOMITING: NO NAUSEA AND NO VOMITING
TOTAL SCORE: 6
VISUAL DISTURBANCES: NOT PRESENT
ANXIETY: NO ANXIETY, AT EASE
ANXIETY: NO ANXIETY, AT EASE
AGITATION: NORMAL ACTIVITY
TOTAL SCORE: 6
PAROXYSMAL SWEATS: BARELY PERCEPTIBLE SWEATING, PALMS MOIST
NAUSEA AND VOMITING: NO NAUSEA AND NO VOMITING
TOTAL SCORE: 5
VISUAL DISTURBANCES: NOT PRESENT
HEADACHE, FULLNESS IN HEAD: NOT PRESENT
AGITATION: NORMAL ACTIVITY
VISUAL DISTURBANCES: NOT PRESENT
TREMOR: NO TREMOR
TREMOR: NO TREMOR
NAUSEA AND VOMITING: NO NAUSEA AND NO VOMITING
AUDITORY DISTURBANCES: NOT PRESENT
NAUSEA AND VOMITING: NO NAUSEA AND NO VOMITING
ORIENTATION AND CLOUDING OF SENSORIUM: DISORIENTED FOR PLACE OR PERSON
HEADACHE, FULLNESS IN HEAD: NOT PRESENT
AGITATION: NORMAL ACTIVITY

## 2024-11-21 ASSESSMENT — PAIN - FUNCTIONAL ASSESSMENT
PAIN_FUNCTIONAL_ASSESSMENT: CPOT (CRITICAL CARE PAIN OBSERVATION TOOL)
PAIN_FUNCTIONAL_ASSESSMENT: 0-10
PAIN_FUNCTIONAL_ASSESSMENT: 0-10
PAIN_FUNCTIONAL_ASSESSMENT: CPOT (CRITICAL CARE PAIN OBSERVATION TOOL)
PAIN_FUNCTIONAL_ASSESSMENT: 0-10
PAIN_FUNCTIONAL_ASSESSMENT: 0-10
PAIN_FUNCTIONAL_ASSESSMENT: CPOT (CRITICAL CARE PAIN OBSERVATION TOOL)
PAIN_FUNCTIONAL_ASSESSMENT: 0-10

## 2024-11-21 ASSESSMENT — COGNITIVE AND FUNCTIONAL STATUS - GENERAL
TOILETING: TOTAL
STANDING UP FROM CHAIR USING ARMS: A LOT
MOVING FROM LYING ON BACK TO SITTING ON SIDE OF FLAT BED WITH BEDRAILS: A LOT
DAILY ACTIVITIY SCORE: 12
WALKING IN HOSPITAL ROOM: TOTAL
DRESSING REGULAR UPPER BODY CLOTHING: A LOT
PERSONAL GROOMING: A LOT
DRESSING REGULAR LOWER BODY CLOTHING: A LOT
STANDING UP FROM CHAIR USING ARMS: TOTAL
TOILETING: A LOT
DAILY ACTIVITIY SCORE: 12
EATING MEALS: A LOT
TURNING FROM BACK TO SIDE WHILE IN FLAT BAD: A LOT
DRESSING REGULAR LOWER BODY CLOTHING: A LOT
DRESSING REGULAR LOWER BODY CLOTHING: TOTAL
MOVING FROM LYING ON BACK TO SITTING ON SIDE OF FLAT BED WITH BEDRAILS: A LOT
TURNING FROM BACK TO SIDE WHILE IN FLAT BAD: TOTAL
HELP NEEDED FOR BATHING: A LOT
MOBILITY SCORE: 6
CLIMB 3 TO 5 STEPS WITH RAILING: TOTAL
DRESSING REGULAR UPPER BODY CLOTHING: TOTAL
HELP NEEDED FOR BATHING: A LOT
TURNING FROM BACK TO SIDE WHILE IN FLAT BAD: A LOT
HELP NEEDED FOR BATHING: TOTAL
STANDING UP FROM CHAIR USING ARMS: A LOT
MOVING FROM LYING ON BACK TO SITTING ON SIDE OF FLAT BED WITH BEDRAILS: TOTAL
WALKING IN HOSPITAL ROOM: A LOT
CLIMB 3 TO 5 STEPS WITH RAILING: TOTAL
MOBILITY SCORE: 12
CLIMB 3 TO 5 STEPS WITH RAILING: A LOT
MOBILITY SCORE: 10
MOVING TO AND FROM BED TO CHAIR: TOTAL
WALKING IN HOSPITAL ROOM: TOTAL
MOVING TO AND FROM BED TO CHAIR: A LOT
TOILETING: A LOT
DAILY ACTIVITIY SCORE: 8
EATING MEALS: A LOT
MOVING TO AND FROM BED TO CHAIR: A LOT
EATING MEALS: A LOT
DRESSING REGULAR UPPER BODY CLOTHING: A LOT
PERSONAL GROOMING: A LOT
PERSONAL GROOMING: A LOT

## 2024-11-21 ASSESSMENT — PAIN SCALES - GENERAL
PAINLEVEL_OUTOF10: 0 - NO PAIN

## 2024-11-21 ASSESSMENT — ACTIVITIES OF DAILY LIVING (ADL): BATHING_ASSISTANCE: TOTAL

## 2024-11-21 ASSESSMENT — PAIN SCALES - WONG BAKER: WONGBAKER_NUMERICALRESPONSE: NO HURT

## 2024-11-21 NOTE — PROGRESS NOTES
Physical Therapy    Physical Therapy Evaluation    Patient Name: Ivan Bravo  MRN: [id number]  Today's Date: 11/21/2024   Time Calculation  Start Time: 1101  Stop Time: 1110  Time Calculation (min): 9 min  14/14-A    Assessment/Plan   PT Assessment  PT Assessment Results: Decreased strength, Decreased endurance, Impaired balance, Decreased mobility, Decreased coordination, Decreased cognition, Impaired judgement, Decreased safety awareness  Rehab Prognosis: Fair  Evaluation/Treatment Tolerance: Patient limited by fatigue  End of Session Communication: Bedside nurse  Assessment Comment: pt with decreased mobility strength balance endurance . pt to benefit from skilled PT to address deficits and improve functional mobility .  End of Session Patient Position: Bed, 3 rail up, Alarm off, not on at start of session  IP OR SWING BED PT PLAN  Inpatient or Swing Bed: Inpatient  PT Plan  Treatment/Interventions: Bed mobility, Transfer training, Gait training, Stair training, Balance training, Strengthening, Endurance training, Therapeutic exercise, Therapeutic activity  PT Plan: Ongoing PT  PT Frequency: 3 times per week  PT Discharge Recommendations: Moderate intensity level of continued care  PT Recommended Transfer Status: Assist x2  PT - OK to Discharge: Yes (once medically ready for discharge to next level of care.)  Subjective   Current Problem:  1. Atrial fibrillation with RVR (Multi)  Transthoracic Echo (TTE) Complete    Transthoracic Echo (TTE) Complete      2. Pneumonia of both lungs due to infectious organism, unspecified part of lung        3. Altered mental status, unspecified altered mental status type        4. NSTEMI (non-ST elevated myocardial infarction) (Multi)  Transthoracic Echo (TTE) Complete    Transthoracic Echo (TTE) Complete        Patient Active Problem List   Diagnosis    Atrial fibrillation with RVR (Multi)     General Visit Information:  General  Reason for Referral: weakness/ impaired  mobility  Referred By: Glenn CEBALLOS OT/PT  Past Medical History Relevant to Rehab: A fib, Hidradenitis suppurativa, Lung nodules, Medical marijuana, ETOH abuse  Co-Treatment: OT  Co-Treatment Reason: To maximize functional outcomes and patient safety.  Prior to Session Communication: Bedside nurse (cleared to see for therapy evals.)  Patient Position Received: Bed, 3 rail up, Alarm off, not on at start of session  General Comment: Patient presented with c/o changes in behavior per wife. + A fib with RVR in the ED. Community acquired PNA. Rapid response called 11/19/24 d/t elevated CIWA, transferred to ICU for continued care. CT head: (-) acute. CXR: retrocardiac airspace opacity which may represent atelectasis or PNA/aspiration. + cannabinoids. Dx: A fib with RVR.  Home Living:  Home Living  Home Living Comments: pt unable to report info. very lethargic and nonverbal at this time.  per report pt lives with his wife.  Prior Level of Function:  Prior Function Per Pt/Caregiver Report  Prior Function Comments: Patient is a poor historian, unable to provide information re: PLOF/home setup.  Precautions:  Precautions  Medical Precautions: Fall precautions  Vital Signs:  Vital Signs  Heart Rate: 94  SpO2: 98 %  BP: 134/60  Objective   Pain:  Pain Assessment  Pain Assessment: 0-10  0-10 (Numeric) Pain Score: 0 - No pain  Cognition:  Cognition  Overall Cognitive Status:  (Patient lethargic during evaluation. Impaired command following, follows less than 25% simple 1 step commands. Patient responds to name/makes eye contact.)  Orientation Level: Unable to assess  Processing Speed: Delayed  General Assessments:  Activity Tolerance  Endurance: Decreased tolerance for upright activites    Strength  Strength Comments: unable to MMT  grossly 2+/5 BLEs  Coordination  Movements are Fluid and Coordinated: No  Static Sitting Balance  Static Sitting-Comment/Number of Minutes: poor  Dynamic Sitting Balance  Dynamic Sitting-Comments:  poor  Static Standing Balance  Static Standing-Comment/Number of Minutes: unable  Dynamic Standing Balance  Dynamic Standing-Comments: unable  Functional Assessments:  Bed Mobility  Bed Mobility: Yes  Bed Mobility 1  Bed Mobility 1: Supine to sitting, Sitting to supine  Level of Assistance 1: Dependent, +2, Maximum verbal cues  Bed Mobility Comments 1: Dep x 2 to EOB .  max cues .  pt tolerated EOB x 3 mins with max A x2 .  pt with poor trunk control. able to move limbs on command in sitting .  Transfers  Transfer: No  Ambulation/Gait Training  Ambulation/Gait Training Performed: No  Extremity/Trunk Assessments:  RLE   RLE : Within Functional Limits  LLE   LLE : Within Functional Limits  Outcome Measures:     [facility] Basic Mobility  Turning from your back to your side while in a flat bed without using bedrails: Total  Moving from lying on your back to sitting on the side of a flat bed without using bedrails: Total  Moving to and from bed to chair (including a wheelchair): Total  Standing up from a chair using your arms (e.g. wheelchair or bedside chair): Total  To walk in hospital room: Total  Climbing 3-5 steps with railing: Total  Basic Mobility - Total Score: 6  Goals:  Encounter Problems       Encounter Problems (Active)       PT Problem       Pt will demonstrate mod A  with bed mobility to edge of bed.         Start:  11/21/24    Expected End:  12/05/24            Pt will demonstrate mod A  with sit to stand/chair transfers with FWW.         Start:  11/21/24    Expected End:  12/05/24            Pt will ambulate 20 feet with FWW mod A .         Start:  11/21/24    Expected End:  12/05/24            Pt to demo improved BLE strength by being able to complete supine/seated thera ex 2x20 BLEs with 4 or less rest breaks .         Start:  11/21/24    Expected End:  12/05/24               Pain - Adult            Education Documentation  Mobility Training, taught by Carlos Lockwood, PT at 11/21/2024  1:22 PM.  Learner:  Patient  Readiness: Acceptance  Method: Explanation  Response: Verbalizes Understanding    Education Comments  No comments found.

## 2024-11-21 NOTE — CONSULTS
"Nutrition Initial Assessment:   Nutrition Assessment    Reason for Assessment: Admission nursing screening    Patient is a 69 y.o. male presenting with afib with RVR.   Past Medical History of pAfib on Pradaxa, ETOH abuse, malnutrition who was brought to [facility] [place] on 11/19 for evaluation of mental status change worsening over the past two days, LKW 11/17 evening.     Nutrition History:  Energy Intake:  (NPO)  Food and Nutrient History: RDN consult for MST score of 5. Pt unable to participate in assessment at this time, wife at bedside and provided history. Wife states pt was 220 lb ~5 years ago. States pt wt in March was 134 lbs, has been having poor po and wt loss since then due to recurring pneumonia. Wife has tried to encouraged ONS at home however pt does not like them. Pt does not have any GI complaints at this time. Per wife pt had bedside swallow eval today with SLP and plans for MBS tomorrow, wife open to feeding tube if needed. Will add Magic Cups with meals pending diet advancement. Will continue to monitor.  Vitamin/Herbal Supplement Use: none  Food Allergies/Intolerances:  None  GI Symptoms: None  Oral Problems: Swallowing difficulty       Anthropometrics:  Height: 172.7 cm (5' 8\")   Weight: 54.1 kg (119 lb 4.3 oz)   BMI (Calculated): 18.14  IBW/kg (Dietitian Calculated): 70 kg  Percent of IBW: 77 %       Weight History:   Wt Readings from Last 10 Encounters:   11/21/24 54.1 kg (119 lb 4.3 oz)      Weight Change %:  Weight History / % Weight Change: no wt history in EMR. wife reports 15 lb, 11% wt loss in 8 months; 5 lb, 4% nonsignificant wt loss in 3 month  Significant Weight Loss: No    Nutrition Focused Physical Exam Findings:    Subcutaneous Fat Loss:   Orbital Fat Pads: Severe (dark circles, hollowing and loose skin)  Buccal Fat Pads: Severe (hollow, sunken and narrow face)  Muscle Wasting:  Temporalis: Severe (hollowed scooping depression)  Pectoralis (Clavicular Region): Severe (protruding prominent " clavicle)  Deltoid/Trapezius: Severe (squared shoulders, acromion process prominent)  Edema:  Edema: none  Physical Findings:  Skin: Negative    Nutrition Significant Labs:  BMP Trend:   Results from last 7 days   Lab Units 11/21/24  0438 11/20/24  1632 11/20/24  0524 11/19/24  0343   GLUCOSE mg/dL 97 163* 140* 71*   CALCIUM mg/dL 9.6 9.2 9.5 9.9   SODIUM mmol/L 136 136 135* 132*   POTASSIUM mmol/L 3.5 3.6 3.1* 3.9   CO2 mmol/L 27 24 23 21   CHLORIDE mmol/L 96* 98 97* 92*   BUN mg/dL 10 13 15 15   CREATININE mg/dL 1.02 1.23 1.43* 1.19    , A1C:  Lab Results   Component Value Date    HGBA1C 4.7 11/20/2024   , BG POCT trend:   Results from last 7 days   Lab Units 11/21/24  1209 11/21/24  1121 11/21/24  0528 11/20/24  0657 11/19/24  0553   POCT GLUCOSE mg/dL 120* 123* 94 156* 100*        Nutrition Specific Medications:  Scheduled medications  multivitamin with minerals, 1 tablet, oral, Daily  [START ON 11/23/2024] thiamine, 100 mg, oral, Daily  thiamine, 100 mg, intravenous, Daily    I/O:    ;      Dietary Orders (From admission, onward)       Start     Ordered    11/20/24 0949  NPO Diet; Effective now  Diet effective now         11/20/24 0948    11/19/24 1148  May Not Participate in Room Service  ( ROOM SERVICE MAY NOT PARTICIPATE)  Once        Question:  .  Answer:  Yes    11/19/24 1147                     Estimated Needs:   Total Energy Estimated Needs (kCal): 1600 kCal  Method for Estimating Needs: 5919-4985 kcal (30-35 kcal/kg ABW)  Total Protein Estimated Needs (g): 65 g  Method for Estimating Needs: 65-81 g (1.2-1.5 g/kg ABW)  Total Fluid Estimated Needs (mL): 1600 mL  Method for Estimating Needs: 1 ml/kcal or per MD        Nutrition Diagnosis   Malnutrition Diagnosis  Patient has Malnutrition Diagnosis: Yes  Diagnosis Status: New  Malnutrition Diagnosis: Severe malnutrition related to starvation  As Evidenced by: severe muscle wasting, severe fat loss, progressive wt loss over the past 8 months, pt likely  meeting < 75% of estimated energy requiriements prior to admission            Nutrition Interventions/Recommendations         Nutrition Prescription:  Individualized Nutrition Prescription Provided for : Advance diet as medically appropriate, texture/consistency pper SLP. If pt unable to safely advance diet, recommend Corpak placement for tube feeding        Nutrition Interventions:   Interventions: Meals and snacks, Medical food supplement, Enteral intake  Meals and Snacks: General healthful diet  Goal: Consumes 3 meals per day  Enteral Intake: Insert enteral feeding tube, Modify rate of enteral nutrition, Modify volume of enteral nutrition, Feeding tube flush  Goal: TF of Jevity 1.5 at goal rate of 50 ml/hr continuous if/when access is established (provides 1800 kcal, 77 g protein and 912 ml free water). 150 ml water flush every 4 hours or per MD.  Medical Food Supplement: Commercial beverage  Goal: Magic cup TID (290 kcal, 9 g protein per serving) pending diet advancement.  Additional Interventions: Initiate TF at 20 ml/hr, advance by 10 ml every 8 hours as tolerated until goal rate is reached. Monitor electrolytes and repleat as needed.  TF at goal rate will meet >75% of estimated energy and protein needs.    Collaboration and Referral of Nutrition Care: Collaboration by nutrition professional with other providers  Goal: IDT rounds    Nutrition Education:   Not appropriate at this time       Nutrition Monitoring and Evaluation   Food/Nutrient Related History Monitoring  Monitoring and Evaluation Plan: Energy intake, Amount of food, Fluid intake  Energy Intake: Estimated energy intake  Criteria: Pt meets >75% of estimated energy needs  Fluid Intake: Estimated fluid intake  Criteria: Meets >75% of estimated fluid needs  Amount of Food: Estimated amout of food, Medical food intake  Criteria: Pt consumes >75% of meals and supplements    Body Composition/Growth/Weight History  Monitoring and Evaluation Plan:  Weight  Weight: Measured weight  Criteria: Maintains stable weight    Biochemical Data, Medical Tests and Procedures  Monitoring and Evaluation Plan: Glucose/endocrine profile, Electrolyte/renal panel  Electrolyte and Renal Panel: Sodium, Potassium, Phosphorus  Criteria: Electrolytes WNL  Glucose/Endocrine Profile: Glucose, casual  Criteria: BG within desirable range              Time Spent (min): 45 minutes

## 2024-11-21 NOTE — CONSULTS
Social Work Note  The LSW participated in the SICU interdisciplinary rounds this date. The pt has a significant history of alcohol abuse and appears to be in active alcohol withdrawal. The pt transferred to SICU in CIWA protocol. The Substance Use Navigator Specialist LSW has been notified of the pt's history of ETOH abuse. The [facility] LSW will continue to monitor the pt's progress for mental and medical appropriateness for the ETOH abuse follow up.

## 2024-11-21 NOTE — PROGRESS NOTES
[facility] Critical Care Medicine       Date:  11/21/2024  Patient:  Ivan Bravo  YOB: 1955  MRN:  [id number]   Admit Date:  11/19/2024  ========================================================================================================    Chief Complaint   Patient presents with    Altered Mental Status     PT PRESENTS TO THE ED VIA EMS FROM HOME AFTER NOT BEING ABLE TO GET OUT OF BED FOR TWO  DAYS AND NOT ACTING HIMSELF. PT HAS A HISTORY OF AFIB         History of Present Illness:  Ivan Bravo is a 69 y.o. year old male patient with Past Medical History of  pAfib on Pradaxa, ETOH abuse, malnutrition who was brought to [facility] [place] ER on 11/19 for evaluation of mental status change worsening over the past two days, LKW 11/17 evening. In the ER he was also noted to be in Afib with RVR, initiated on Cardizem infusion and subsequently admitted to SDU with CIWA monitoring. Today, he was noted to be increasingly agitated/confused with CIWA 14-15, Afib with HR 90-120s, controlled with IV lopressor scheduled. Transfer to ICU for further management    CTH negative for acute process, scattered white matter hypodensities suggestive of small vessel ischemia     Interval ICU Events:  11/20: Transfer to ICU, Phenobarb IV taper with PRN, NPO status. Continue treatment with Azithro/Ceftriaxone. Monitor neuro status, no focal changes. Holding pradaxa with therapueitc Lovenox per cardiology. SLP/PT/OT therapies ordered.    11/21: Drowsy yesterday s/p medications for agitation. Mentation improving, when able to participate perform SLP/bedside swallow evaluation. Will need PT/OT. Cardiology scheduled metop, TTE normal.    Medical History:  History reviewed. No pertinent past medical history.  History reviewed. No pertinent surgical history.  Medications Prior to Admission   Medication Sig Dispense Refill Last Dose/Taking    cyclobenzaprine (Flexeril) 10 mg tablet Take 1 tablet (10 mg) by mouth 3 times a day as  needed.   Taking As Needed    dabigatran etexilate (Pradaxa) 150 mg capsule Take 1 capsule (150 mg) by mouth.   Taking    gabapentin (Neurontin) 400 mg capsule Take 1 capsule (400 mg) by mouth.   Taking    metoprolol tartrate (Lopressor) 25 mg tablet Take 0.5 tablets (12.5 mg) by mouth.   Taking     Oxycodone  Social History     Tobacco Use    Smoking status: Unknown   Substance Use Topics    Alcohol use: Yes     Alcohol/week: 12.0 standard drinks of alcohol     Types: 12 Cans of beer per week    Drug use: Not Currently     No family history on file.    Review of Systems:  14 point review of systems was completed and negative except for those specially mention in my HPI    Physical Exam:    Heart Rate:  []   Temp:  [35.4 °C (95.7 °F)-36.1 °C (97 °F)]   Resp:  [20-35]   BP: (114-185)/()   Weight:  [54.1 kg (119 lb 4.3 oz)-54.5 kg (120 lb 2.4 oz)]   SpO2:  [84 %-96 %]     Physical Exam  Vitals and nursing note reviewed.   Constitutional:       Appearance: He is underweight. He is ill-appearing.   HENT:      Mouth/Throat:      Pharynx: Oropharynx is clear.   Eyes:      Pupils: Pupils are equal, round, and reactive to light.   Cardiovascular:      Rate and Rhythm: Regular rhythm. Tachycardia present.      Pulses: Normal pulses.   Pulmonary:      Effort: Pulmonary effort is normal.      Breath sounds: Normal breath sounds.   Abdominal:      General: Abdomen is flat. There is no distension.      Palpations: Abdomen is soft.      Tenderness: There is no abdominal tenderness.   Musculoskeletal:         General: Normal range of motion.      Right lower leg: No edema.      Left lower leg: No edema.   Skin:     General: Skin is warm.      Capillary Refill: Capillary refill takes less than 2 seconds.   Neurological:      General: No focal deficit present.      Mental Status: He is disoriented.   Psychiatric:         Judgment: Judgment is impulsive.         Objective:    I have reviewed all medications, laboratory  results, and imaging pertinent for today's encounter    Assessment/Plan:    I am currently managing this critically ill patient for the following problems:    Neuro/Psych/Pain Ctrl/Sedation:  Alcohol withdrawal drinks 3-4 beers daily, Ethanol level <10 on admission  Metabolic encephalopathy  THC use medical marijuana gummies HS  Phenobarb taper with PRN, convert to PO when able  Thiamine/MV/folic acid  CAM ICU, CIWA scoring    Respiratory/ENT:  Pulmonary nodules s/p bronch/biopsy with [facility] 9/2024  Current nicotine smoker  No O2 needs  Maintain SpO2 >92%  Nicotine patch if needed    Cardiovascular:  Afib RVR newly diagnosed Afib s/p Covid in 9/2024  Pradaxa on hold NPO, Lovenox Q12  Q4 Lopressor IV  PRN antihypertensives  TTE: EF 50-55%, underlying atrial fibrillation/limited study   Cardiology consulted    GI:  Severe protein calorie malnutrition  Dysphagia  SLP eval: Pureed, nectar thick liquids  MBS tomorrow    Renal/Volume Status (Intra & Extravascular):  YOLY resolved  Hypokalemia, hypomagnesemia  Consider mIVF if unable to take PO today  Replete electrolytes  Daily CMP, Mg  Monitor UOP    Endocrine  TSH/A1C normal  POCT Q6 while NPO  Hypoglycemia precautions    Infectious Disease:  CAP  SIRS  CXR- bilateral opacities  Continue Ceftriaxone  UA negative  Antigens negative  BC NGTD    Heme/Onc:  BAL performed at [facility]  Daily CBC    MSK:  Uses walker/electric wheelchair at baseline  PT/OT    Ethics/Code Status:  Full Code    :  DVT Prophylaxis: Lovenox  GI Prophylaxis: None  Bowel Regimen: Miralax PRN  Diet: Puree/Nectar thick  CVC: None  Metz: None  Ivory: None  Restraints: None  Dispo: ICU    Critical Care Time:  45 minutes spent in preparing to see patient (I.e. review of medical records), evaluation of diagnostics (I.e. labs, imaging, etc.), documentation, discussing plan of care with patient/ family/ caregiver, and/ or coordination of care with multidisciplinary team. Time does not include completion  of procedure time.      Sandi Elizabeth, NATHALIE-CNP

## 2024-11-21 NOTE — PROGRESS NOTES
[facility] Heart Progress Note           Rounding MARIBETH/Cardiologist:  Kaushal Barajas, APRN-CNP,     Primary Cardiologist: Dr. Ricardo Luna    Date:  11/21/2024  Patient:  Ivan Bravo  YOB: 1955  MRN:  [id number]   Admit Date:  11/19/2024      SUBJECTIVE:    11/21/24  Patient remains in SICU.  His eyes are open he responds to stimuli still slightly lethargic but much improved from yesterday.  Following simple commands squeezing hand.  Telemetry A-fib rate 100  EKG A-fib rate 112 no acute elevations or depressions    Echo  CONCLUSIONS:   1. Left ventricular ejection fraction is low normal, by visual estimate at 50-55%.   2. Abnormal wall motion.   3. Left ventricular diastolic filling was indeterminate.   4. Underlying atrial fibrillation.      Moderate hypokinesis of the basal inferior wall.   5. There is normal right ventricular global systolic function.   6. Trivial tricuspid regurgitation.   7. Comparison study dated 5/11/20 showed an LVEF 60%. Trivial MR and       VITALS:     Vitals:    11/21/24 0500 11/21/24 0600 11/21/24 0700 11/21/24 0800   BP: 126/73 (!) 147/109 (!) 175/114 114/77   BP Location:  Right arm Right arm    Patient Position:  Lying Lying    Pulse: 100 104 (!) 118 (!) 120   Resp: 21 21 23 24   Temp:  35.6 °C (96.1 °F)     TempSrc:  Temporal     SpO2: 92% 92% 92% 92%   Weight:  54.1 kg (119 lb 4.3 oz)     Height:           Intake/Output Summary (Last 24 hours) at 11/21/2024 0817  Last data filed at 11/21/2024 0600  Gross per 24 hour   Intake 1747.08 ml   Output 930 ml   Net 817.08 ml       Wt Readings from Last 4 Encounters:   11/21/24 54.1 kg (119 lb 4.3 oz)       CURRENT HOSPITAL MEDICATIONS:   azithromycin, 500 mg, intravenous, q24h  cefTRIAXone, 1 g, intravenous, q24h  [Held by provider] dabigatran etexilate, 150 mg, oral, BID  enoxaparin, 40 mg, subcutaneous, q24h  [Held by provider] metoprolol tartrate, 50 mg, oral, BID  multivitamin with minerals, 1 tablet, oral,  Daily  PHENobarbital, 65 mg, intravenous, q8h   Followed by  [START ON 11/22/2024] PHENobarbital, 32.5 mg, intravenous, q8h  potassium chloride, 20 mEq, intravenous, q2h  sodium chloride, 500 mL, intravenous, Once  [START ON 11/23/2024] thiamine, 100 mg, oral, Daily  thiamine, 100 mg, intravenous, Daily         Current Outpatient Medications   Medication Instructions    cyclobenzaprine (FLEXERIL) 10 mg, 3 times daily PRN    dabigatran etexilate (PRADAXA) 150 mg    gabapentin (NEURONTIN) 400 mg    metoprolol tartrate (LOPRESSOR) 12.5 mg        PHYSICAL EXAMINATION:   GENERAL APPEARANCE: Ill appearance  CHEST: Symmetric and non-tender.  INTEGUMENT: Skin warm and dry, without gross excoriationis or lesions.  HEENT: No gross abnormalities of conjunctiva, teeth, gums, oral mucosa  NECK: Supple, no JVD, no bruit. Thyroid not palpable. Carotid upstrokes normal.  NEURO/PSHCY: Eyes open hands squeezes on command  LUNGS: Bilateral rhonchi  HEART: S1, S2 irregular  ABDOMEN: Soft, nontender, no palpable hepatosplenomegaly, no mases, no bruits. Abdominal aorta not noted to be enlarged.  EXTREMITIES: Warm with good color, no clubbing or cyanois. There is no edema noted.  PERIPHERAL VASCULAR: Pulses present and equally palpable; 1+ throughout. No femoral bruits      LAB DATA:     CBC:   Results from last 7 days   Lab Units 11/21/24  0438 11/20/24  0524 11/19/24  0315   WBC AUTO x10*3/uL 11.1 13.7* 10.0   RBC AUTO x10*6/uL 5.66 4.88 5.52   HEMOGLOBIN g/dL 17.6* 15.1 17.5   HEMATOCRIT % 54.1* 46.2 53.5*   MCV fL 96 95 97   MCH pg 31.1 30.9 31.7   MCHC g/dL 32.5 32.7 32.7   RDW % 14.1 14.1 14.2   PLATELETS AUTO x10*3/uL 259 378 377     CMP:    Results from last 7 days   Lab Units 11/21/24  0438 11/20/24  1632 11/20/24  0524 11/19/24  0343   SODIUM mmol/L 136 136 135* 132*   POTASSIUM mmol/L 3.5 3.6 3.1* 3.9   CHLORIDE mmol/L 96* 98 97* 92*   CO2 mmol/L 27 24 23 21   BUN mg/dL 10 13 15 15   CREATININE mg/dL 1.02 1.23 1.43* 1.19    GLUCOSE mg/dL 97 163* 140* 71*   PROTEIN TOTAL g/dL 8.0  --   --  9.0*   CALCIUM mg/dL 9.6 9.2 9.5 9.9   BILIRUBIN TOTAL mg/dL 0.8  --   --  1.2   ALK PHOS U/L 74  --   --  88   AST U/L 28  --   --  19   ALT U/L 7*  --   --  5*     BMP:    Results from last 7 days   Lab Units 11/21/24  0438 11/20/24  1632 11/20/24  0524   SODIUM mmol/L 136 136 135*   POTASSIUM mmol/L 3.5 3.6 3.1*   CHLORIDE mmol/L 96* 98 97*   CO2 mmol/L 27 24 23   BUN mg/dL 10 13 15   CREATININE mg/dL 1.02 1.23 1.43*   CALCIUM mg/dL 9.6 9.2 9.5   GLUCOSE mg/dL 97 163* 140*     Magnesium:  Results from last 7 days   Lab Units 11/21/24  0438 11/20/24  1632 11/20/24  0524   MAGNESIUM mg/dL 2.15 2.59* 1.51*     Troponin:    Results from last 7 days   Lab Units 11/20/24  0524 11/19/24  0343   TROPHS ng/L 32* 10     BNP:   Results from last 7 days   Lab Units 11/19/24  0315   BNP pg/mL 278*     Lipid Panel:         DIAGNOSTIC TESTING:   @No results found for this or any previous visit.      ECG 12 Lead    Result Date: 11/20/2024  Atrial fibrillation with premature ventricular or aberrantly conducted complexes Septal infarct (cited on or before 09-MAY-2020) Abnormal ECG When compared with ECG of 19-NOV-2024 06:55, T wave inversion no longer evident in Inferior leads T wave inversion no longer evident in Lateral leads Confirmed by Isac Pereira ([id number]) on 11/20/2024 12:05:30 PM    ECG 12 lead    Result Date: 11/19/2024  Atrial fibrillation Rightward axis Anteroseptal infarct (cited on or before 09-MAY-2020) Marked ST abnormality, possible inferior subendocardial injury Abnormal ECG When compared with ECG of 19-NOV-2024 06:02, (unconfirmed) No significant change was found See ED provider note for full interpretation and clinical correlation Confirmed by Leslee Del Angel ([id number]) on 11/19/2024 11:04:23 AM    ECG 12 lead    Result Date: 11/19/2024  Atrial fibrillation with rapid ventricular response with premature ventricular or aberrantly conducted  complexes Anteroseptal infarct (cited on or before 09-MAY-2020) Marked ST abnormality, possible inferior subendocardial injury Abnormal ECG When compared with ECG of 09-MAY-2020 09:47, ST more depressed in Inferior leads Inverted T waves have replaced nonspecific T wave abnormality in Inferior leads T wave inversion now evident in Lateral leads See ED provider note for full interpretation and clinical correlation Confirmed by Leslee Del Angel ([id number]) on 11/19/2024 11:03:59 AM    ECG 12 lead    Result Date: 11/19/2024  Atrial fibrillation with rapid ventricular response Rightward axis Anteroseptal infarct (cited on or before 09-MAY-2020) Marked ST abnormality, possible inferior subendocardial injury Abnormal ECG When compared with ECG of 19-NOV-2024 05:59, (unconfirmed) No significant change was found See ED provider note for full interpretation and clinical correlation Confirmed by Leslee Del Angel ([id number]) on 11/19/2024 11:03:40 AM    XR chest 1 view    Result Date: 11/19/2024  Interpreted By:  Finkelstein, Evan, STUDY: XR CHEST 1 VIEW;  11/19/2024 5:24 am   INDICATION: Signs/Symptoms:aspiration.     COMPARISON: Chest radiograph 05/09/2020   ACCESSION NUMBER(S): [id number]   ORDERING CLINICIAN: PILAR EASLEY   FINDINGS: Median sternotomy wires are present.   CARDIOMEDIASTINAL SILHOUETTE: Cardiomediastinal silhouette is stable in size and configuration.   LUNGS: Retrocardiac airspace opacity. Skin folds overlie the right hemithorax. No sizable pleural effusion or pneumothorax.   ABDOMEN: No remarkable upper abdominal findings.   BONES: No acute osseous abnormality.       Retrocardiac airspace opacity which may represent atelectasis or pneumonia/aspiration in the appropriate clinical setting.   MACRO: None.   Signed by: Evan Finkelstein 11/19/2024 6:21 AM Dictation workstation:   [id number]    CT head wo IV contrast    Result Date: 11/19/2024  Interpreted By:  Finkelstein, Evan, STUDY: CT HEAD WO IV  CONTRAST;  11/19/2024 5:23 am   INDICATION: Signs/Symptoms:ams.     COMPARISON: CT brain 05/12/2020   ACCESSION NUMBER(S): [id number]   ORDERING CLINICIAN: PILAR EASLEY   TECHNIQUE: Axial noncontrast CT images of the head with coronal and sagittal reconstructions.   FINDINGS: EXTRACRANIAL SOFT TISSUES: Unremarkable.   CALVARIUM: No depressed skull fracture. No destructive osseous lesion.   PARANASAL SINUSES/MASTOIDS: The visualized paranasal sinuses and mastoid air cells are aerated.   HEMORRHAGE: No acute intracranial hemorrhage.   BRAIN PARENCHYMA: Gray-white matter interfaces are preserved. No mass effect or midline shift. There are nonspecific scattered white matter hypodensities.   VENTRICLES and EXTRA-AXIAL SPACES: Parenchymal atrophy with prominence of the ventricles and cortical sulci.   OTHER FINDINGS: There are calcifications within the cavernous carotids       No acute intracranial hemorrhage, mass effect or midline shift.   Nonspecific scattered white matter hypodensities favored to represent sequela of small vessel ischemia.     MACRO: None.   Signed by: Evan Finkelstein 11/19/2024 5:31 AM Dictation workstation:   [id number]       Transthoracic Echo (TTE) Complete   Final Result      XR chest 1 view   Final Result   Retrocardiac airspace opacity which may represent atelectasis or   pneumonia/aspiration in the appropriate clinical setting.        MACRO:   None.        Signed by: Evan Finkelstein 11/19/2024 6:21 AM   Dictation workstation:   [id number]      CT head wo IV contrast   Final Result   No acute intracranial hemorrhage, mass effect or midline shift.        Nonspecific scattered white matter hypodensities favored to represent   sequela of small vessel ischemia.             MACRO:   None.        Signed by: Evan Finkelstein 11/19/2024 5:31 AM   Dictation workstation:   [id number]              RADIOLOGY:     Transthoracic Echo (TTE) Complete   Final Result      XR chest 1 view   Final  Result   Retrocardiac airspace opacity which may represent atelectasis or   pneumonia/aspiration in the appropriate clinical setting.        MACRO:   None.        Signed by: Evan Finkelstein 11/19/2024 6:21 AM   Dictation workstation:   [id number]      CT head wo IV contrast   Final Result   No acute intracranial hemorrhage, mass effect or midline shift.        Nonspecific scattered white matter hypodensities favored to represent   sequela of small vessel ischemia.             MACRO:   None.        Signed by: Evan Finkelstein 11/19/2024 5:31 AM   Dictation workstation:   [id number]          PROBLEM LIST     Patient Active Problem List   Diagnosis    Atrial fibrillation with RVR (Multi)       ASSESSMENT:   Metabolic encephalopathy  A-fib with RVR  Hypertension  Substance abuse  EF 50 to 55%        PLAN:   Patient seen and examined in conjunction with NATHALIE Beckwith and agree with the evaluation as noted above.     I have personally interviewed and examined the patient.   I have personally and independently reviewed labs and diagnostic testing.  I have personally verified the elements of the history and physical listed above and changes, if any, are noted.      69-year-old with a history of paroxysmal atrial fibrillation, hypertension substance abuse was brought in by his wife because of increased unresponsiveness and he was not acting like himself.  He has a long standing history of atrial fibrillation for which she was previously on Lopressor and Pradaxa for anticoagulation.  It appears that the Lopressor was recently discontinued at the [facility].  He also has a significant history of alcohol abuse and appears to be in active alcohol withdrawal so he was transferred to the ICU.     Agree with examination as noted above.  Patient is sedated but arousable, with significant mouth breathing.  Cardiac exam reveals irregularly irregular S1-S2, with no murmurs appreciated.  Chest reveals reduced breath sounds  at the lung bases bilaterally.     ASSESSMENT AND PLAN:  1.  Persistent atrial fibrillation: Appears multifactorial, in setting of significant EtOH abuse and withdrawal.  The rate appears improved on current IV Lopressor.  Agree with plans for continued IV hydration and supportive treatment for early alcohol withdrawal.  Will continue current anticoagulation strategy with Lovenox.  Will get a 2D echocardiogram for further evaluation.  We will not pursue cardioversion at this time due to active ongoing with the lower, but consider elective cardioversion when patient is more stable.  2.  Active substance abuse: Agree with current supportive treatment per ICU team.    11/21/24  ICU monitoring  Lovenox 1 mg/kg every 12 hours  Lopressor 5 mg every 4 hours for rate control  Keep magnesium greater than 2.0  Keep potassium between 4.0 and 4.5  Cardiology continue to follow along    30 minutes    Alexis Barajas CNP  [facility]      Of note, this documentation is completed using the Dragon Dictation system (voice recognition software). There may be spelling and/or grammatical errors that were not corrected prior to final submission.    Please do not hesitate to call with questions.  Electronically signed by JOON Yap, on 11/21/2024 at 8:17 AM

## 2024-11-21 NOTE — PROGRESS NOTES
Inpatient Speech-Language Pathology Clinical Swallow Evaluation       Patient Name: Ivan Bravo  MRN: [id number]  : 1955  Today's Date: 24  Time Calculation  Start Time: 1330  Stop Time: 1400  Time Calculation (min): 30 min     Room:     Assessment:     Consistencies Trialed: Consistencies Trialed: Thin (IDDSI Level 0) - Cup, Thin (IDDSI Level 0) - Straw, Nectar thick/mildly thick (IDDSI Level 2) - Cup, Regular (IDDSI Level 7), Pureed/extremely thick (IDDSI Level 4)   Oral Motor: Impaired Function  Lingual Strength: Within Functional Limits   Dentition: Edentulous     Assessment Results:   DYSPHAGIA EVALUATION: (Consistencies attempted: Puree, gram cracker trial, thin liquids via cup and straws)    Oral Status: Patient had copious thick secretions hanging from the roof of his mouth and throughout oral cavity.  Extensive oral care was completed.  It was cooperative throughout oral care being completed.  Oral cavity clean and clear post oral care.  Patient was edentulous.    Oral Phase: Patient's oral phase of the swallow characterized by mild oral delay with pudding trial.  Oral cavity clean and clear post pudding trial.  Patient had moderate to severe oral delay with brayan cracker trial secondary to edentulous status.  He had mild oral residual post swallow with brayan cracker.  Patient had mild leakage of thin liquids out of mouth with cup sips.  No leakage seen with straw sips.    Pharyngeal Phase: Patient's pharyngeal phase of the swallow characterized by wet vocal quality and coughing post thin liquid trials.  Patient was able to tolerate puréed consistency food without overt signs symptoms of aspiration.  He also tolerated the brayan cracker without overt signs symptoms aspiration.  Patient tolerated nectar thick liquid trials without overt signs symptoms aspiration.  Unable to rule out possibility of silent aspiration with nectar thick liquids.  Patient has had poor nutrition prior  to admission per family member at bedside.  Spoke with nurse practitioner on intensivist team regarding results and recommendations of clinical swallow evaluation.  Was recommended that patient be upgraded to purée with nectar thick liquids and that a modified barium swallow study be completed tomorrow a.m to further assess the physiology of the swallow.    DIAGNOSTIC IMPRESSIONS:  Patient presents with a moderate to severe oral phase dysphagia and suspected pharyngeal phase dysphagia.  Amending further evaluation via modified barium swallow study to be completed tomorrow.  Patient currently on CIWA scale and will be on less sedating medication by tomorrow a.m. per intensivist team nurse practitioner.  Patient will be upgraded to purée with nectar thick liquids until modified barium swallow study can be completed.    Medical Staff Made Aware: Yes      Plan:     SLP Plan: Skilled SLP Skilled speech therapy for dysphagia treatment is warranted in order to provide training and instruction regarding the use of compensatory swallow strategies, oropharyngeal strengthening exercises, and pt/caregiver education in order to reduce risk of aspiration, dehydration and malnutrition.  SLP Frequency: 3x per week   Duration: 30 days   Next Treatment Priority: MBS Fri   Discussed POC: Patient, Nursing, Caregiver/family, Physician   Discussed Risks/Benefits: Yes, Patient, Caregiver/Family, Nursing, Physician   Patient/Caregiver Agreeable: Yes   SLP Discharge Recommendations: unable to determine at this time, refer to subsequent notes     Subjective:      Patient seen at bedside.  He was resting comfortably and was difficult to awaken initially.  Patient required both verbal and tactile cueing and repositioning to become awake and alert.  Patient remained awake and alert throughout the clinical swallow evaluation.  Patient family member at bedside giving past medical history information.  She stated that patient has had recurrent  pneumonia since September and has had poor p.o. intake and lost significant weight since September.     General Visit Information:     Patient is a 69 y.o. year old male patient with Past Medical History of  pAfib on Pradaxa, ETOH abuse, malnutrition who was brought to [facility] on 11/19 for evaluation of mental status change worsening over the past two days, LKW 11/17 evening. In the ER he was also noted to be in Afib with RVR, initiated on Cardizem infusion and subsequently admitted to SDU with CIWA monitoring.     Living Environment: Home           Reason for Referral: Dysphagia      Current Diet : N.p.o.  Prior to Session Communication: Bedside nurse      Pain:     Pain Assessment  Pain Assessment: 0-10  0-10 (Numeric) Pain Score: 0 - No pain      Baseline Assessment:     Respiratory Status: Oxygen via nasal cannula   Patient Positioning: Upright in Bed      Goals:     1.  Patient will be able to tolerate or complete oral care 2-3 times per day to improve oral status and decrease risk of aspiration.  Start Date: 11/21/24  Progress: Patient tolerated oral care completed by speech-language pathologist well.  He had copious thick secretions hanging from the roof of his mouth and throughout oral cavity.  Extensive oral care was completed.  Cavity clean and clear post oral care.  Status: Continue goal    2.  Patient will be able to tolerate puréed diet with nectar thick liquids without overt signs symptoms aspiration or pulmonary compromise.  Start Date: 11/21/24  Progress: No signs symptoms aspiration with purée consistency foods or nectar thick liquids during clinical swallow evaluation.  Unable to rule out possibility of silent aspiration in clinical swallow evaluation.  Status: Continue goal    *Additional goals to be determined after modified barium swallow study is completed tomorrow.     In Patient Education:   Pt. educated on safe swallow strategies, role of SLP, S/S of aspiration to be aware of, risks of  aspiration, current swallow function, recommended diet, recommendation of Modified Barium swallow study and procedure  Patient needs further instruction

## 2024-11-21 NOTE — PROGRESS NOTES
Occupational Therapy    Evaluation    Patient Name: Ivan Bravo  MRN: [id number]  Department: [facility]  Room: 14/14-A  Today's Date: 11/21/2024  Time Calculation  Start Time: 1100  Stop Time: 1112  Time Calculation (min): 12 min      Assessment:  OT Assessment: Limited by impaired cognition, poor trunk control, balance deficits and decreased strength/endurance which are limiting ability to complete ADLs, transfers and functional mobility.  Prognosis: Fair  End of Session Communication: Bedside nurse  End of Session Patient Position: Bed, 3 rail up, Alarm off, not on at start of session  OT Assessment Results: Decreased ADL status, Decreased safe judgment during ADL, Decreased upper extremity strength, Decreased endurance, Decreased cognition, Decreased functional mobility  Prognosis: Fair  Plan:  Treatment Interventions: ADL retraining, Functional transfer training, UE strengthening/ROM, Endurance training, Cognitive reorientation  OT Frequency: 2 times per week  OT Discharge Recommendations: Moderate intensity level of continued care  OT - OK to Discharge: Yes (Once medically appropriate.)  Treatment Interventions: ADL retraining, Functional transfer training, UE strengthening/ROM, Endurance training, Cognitive reorientation    Subjective     General:  General  Reason for Referral: ADLs  Referred By: Glenn CEBALLOS  Past Medical History Relevant to Rehab: A fib, Hidradenitis suppurativa, Lung nodules, Medical marijuana, ETOH abuse  Co-Treatment: PT  Co-Treatment Reason: To maximize functional outcomes and patient safety.  Prior to Session Communication: Bedside nurse (Cleared for therapy evaluation by RN.)  Patient Position Received: Bed, 3 rail up, Alarm off, not on at start of session  General Comment: Patient presented with c/o changes in behavior per wife. + A fib with RVR in the ED. Community acquired PNA. Rapid response called 11/19/24 d/t elevated CIWA, transferred to ICU for continued care. CT head: (-)  acute. CXR: retrocardiac airspace opacity which may represent atelectasis or PNA/aspiration. + cannabinoids. Dx: A fib with RVR.  Precautions:  Medical Precautions: Fall precautions    Vital Sign (Past 2hrs)    Beginning of evaluation: heart rate 96, /60, O2 98%        Pain:  Pain Assessment  Pain Assessment: 0-10  0-10 (Numeric) Pain Score: 0 - No pain    Objective   Cognition:  Overall Cognitive Status:  (Patient lethargic during evaluation. Impaired command following, follows less than 25% simple 1 step commands. Patient responds to name/makes eye contact.)  Orientation Level: Unable to assess  Processing Speed: Delayed           Home Living:  Home Living Comments: Patient is a poor historian, unable to provide information re: PLOF/home setup.  Prior Function:  Prior Function Comments: Patient is a poor historian, unable to provide information re: PLOF/home setup.     ADL:  Eating Assistance: Moderate  Grooming Assistance: Maximal  Bathing Assistance: Total  UE Dressing Assistance: Total  LE Dressing Assistance: Total  Toileting Assistance with Device: Total  Activity Tolerance:  Endurance: Decreased tolerance for upright activites  Bed Mobility/Transfers: Bed Mobility  Bed Mobility: Yes  Bed Mobility 1  Bed Mobility 1: Supine to sitting, Sitting to supine  Bed Mobility Comments 1: HOB elevated. Dep x2 for supine <> sit.          Sitting Balance:  Static Sitting Balance  Static Sitting-Comment/Number of Minutes: Poor; patient tolerated static sitting at EOB. Poor trunk control, continuous cues for upright posture/balance.     Strength:  Strength Comments: Unable to assess d/t impaired cognition.     Extremities: RUE   RUE :  (Patient with spontaneous movement of the R UE. R UE AAROM shoulder flex (90 degrees), elbow flex/ext and grasp WFL.) and LUE   LUE:  (Patient with spontaneous movement of the L UE. L UE AAROM shoulder flex (90 degrees), elbow flex/ext and grasp WFL.)    Outcome Measures:[facility] Daily  Activity  Putting on and taking off regular lower body clothing: Total  Bathing (including washing, rinsing, drying): Total  Putting on and taking off regular upper body clothing: Total  Toileting, which includes using toilet, bedpan or urinal: Total  Taking care of personal grooming such as brushing teeth: A lot  Eating Meals: A lot  Daily Activity - Total Score: 8    Education Documentation  Body Mechanics, taught by Liz Lopez OT at 11/21/2024 12:53 PM.  Learner: Patient  Readiness: Nonacceptance  Method: Explanation  Response: No Evidence of Learning    EDUCATION:  Education  Individual(s) Educated: Patient  Education Provided: POC discussed and agreed upon, Risk and benefits of OT discussed with patient or other, Fall precautons  Patient Response to Education: Patient/Caregiver Verbalized Understanding of Information    Goals:  Encounter Problems       Encounter Problems (Active)       OT Goals       Patient will complete functional transfers at a mod A level.  (Progressing)       Start:  11/21/24    Expected End:  12/05/24            Patient will demonstrate fair + dynamic sitting balance and fair - static standing balance during functional tasks. (Progressing)       Start:  11/21/24    Expected End:  12/05/24            Patient will tolerate sitting EOB greater than 8 minutes during functional tasks. (Progressing)       Start:  11/21/24    Expected End:  12/05/24            Patient will complete grooming with setup/S.  (Progressing)       Start:  11/21/24    Expected End:  12/05/24            Patient will complete upper body dressing with setup/S. (Progressing)       Start:  11/21/24    Expected End:  12/05/24

## 2024-11-22 ENCOUNTER — APPOINTMENT (OUTPATIENT)
Dept: RADIOLOGY | Facility: HOSPITAL | Age: 69
DRG: 177 | End: 2024-11-22
Payer: MEDICARE

## 2024-11-22 ENCOUNTER — APPOINTMENT (OUTPATIENT)
Dept: CARDIOLOGY | Facility: HOSPITAL | Age: 69
DRG: 177 | End: 2024-11-22
Payer: MEDICARE

## 2024-11-22 LAB
ALBUMIN SERPL BCP-MCNC: 3.7 G/DL (ref 3.4–5)
ALP SERPL-CCNC: 71 U/L (ref 33–136)
ALT SERPL W P-5'-P-CCNC: 6 U/L (ref 10–52)
ANION GAP SERPL CALC-SCNC: 16 MMOL/L (ref 10–20)
AST SERPL W P-5'-P-CCNC: 25 U/L (ref 9–39)
BASOPHILS # BLD AUTO: 0.07 X10*3/UL (ref 0–0.1)
BASOPHILS NFR BLD AUTO: 0.7 %
BILIRUB SERPL-MCNC: 0.8 MG/DL (ref 0–1.2)
BUN SERPL-MCNC: 9 MG/DL (ref 6–23)
CALCIUM SERPL-MCNC: 9.5 MG/DL (ref 8.6–10.3)
CHLORIDE SERPL-SCNC: 99 MMOL/L (ref 98–107)
CO2 SERPL-SCNC: 27 MMOL/L (ref 21–32)
CREAT SERPL-MCNC: 0.94 MG/DL (ref 0.5–1.3)
EGFRCR SERPLBLD CKD-EPI 2021: 87 ML/MIN/1.73M*2
EOSINOPHIL # BLD AUTO: 0.29 X10*3/UL (ref 0–0.4)
EOSINOPHIL NFR BLD AUTO: 2.8 %
ERYTHROCYTE [DISTWIDTH] IN BLOOD BY AUTOMATED COUNT: 14.2 % (ref 11.5–14.5)
GLUCOSE BLD MANUAL STRIP-MCNC: 168 MG/DL (ref 74–99)
GLUCOSE BLD MANUAL STRIP-MCNC: 87 MG/DL (ref 74–99)
GLUCOSE SERPL-MCNC: 71 MG/DL (ref 74–99)
HCT VFR BLD AUTO: 52 % (ref 41–52)
HGB BLD-MCNC: 17 G/DL (ref 13.5–17.5)
HOLD SPECIMEN: NORMAL
IMM GRANULOCYTES # BLD AUTO: 0.03 X10*3/UL (ref 0–0.5)
IMM GRANULOCYTES NFR BLD AUTO: 0.3 % (ref 0–0.9)
LYMPHOCYTES # BLD AUTO: 1.34 X10*3/UL (ref 0.8–3)
LYMPHOCYTES NFR BLD AUTO: 13 %
MAGNESIUM SERPL-MCNC: 1.87 MG/DL (ref 1.6–2.4)
MCH RBC QN AUTO: 30.9 PG (ref 26–34)
MCHC RBC AUTO-ENTMCNC: 32.7 G/DL (ref 32–36)
MCV RBC AUTO: 95 FL (ref 80–100)
MONOCYTES # BLD AUTO: 0.99 X10*3/UL (ref 0.05–0.8)
MONOCYTES NFR BLD AUTO: 9.6 %
NEUTROPHILS # BLD AUTO: 7.61 X10*3/UL (ref 1.6–5.5)
NEUTROPHILS NFR BLD AUTO: 73.6 %
NRBC BLD-RTO: 0 /100 WBCS (ref 0–0)
PHOSPHATE SERPL-MCNC: 2.3 MG/DL (ref 2.5–4.9)
PLATELET # BLD AUTO: 306 X10*3/UL (ref 150–450)
POTASSIUM SERPL-SCNC: 3.5 MMOL/L (ref 3.5–5.3)
PROT SERPL-MCNC: 7.6 G/DL (ref 6.4–8.2)
RBC # BLD AUTO: 5.5 X10*6/UL (ref 4.5–5.9)
SODIUM SERPL-SCNC: 138 MMOL/L (ref 136–145)
WBC # BLD AUTO: 10.3 X10*3/UL (ref 4.4–11.3)

## 2024-11-22 PROCEDURE — 93005 ELECTROCARDIOGRAM TRACING: CPT

## 2024-11-22 PROCEDURE — 83735 ASSAY OF MAGNESIUM: CPT | Performed by: NURSE PRACTITIONER

## 2024-11-22 PROCEDURE — 2500000004 HC RX 250 GENERAL PHARMACY W/ HCPCS (ALT 636 FOR OP/ED): Performed by: NURSE PRACTITIONER

## 2024-11-22 PROCEDURE — 2500000004 HC RX 250 GENERAL PHARMACY W/ HCPCS (ALT 636 FOR OP/ED)

## 2024-11-22 PROCEDURE — 2500000001 HC RX 250 WO HCPCS SELF ADMINISTERED DRUGS (ALT 637 FOR MEDICARE OP)

## 2024-11-22 PROCEDURE — 36415 COLL VENOUS BLD VENIPUNCTURE: CPT | Performed by: NURSE PRACTITIONER

## 2024-11-22 PROCEDURE — 97530 THERAPEUTIC ACTIVITIES: CPT | Mod: GP,CQ

## 2024-11-22 PROCEDURE — 92611 MOTION FLUOROSCOPY/SWALLOW: CPT | Mod: GN

## 2024-11-22 PROCEDURE — 2500000005 HC RX 250 GENERAL PHARMACY W/O HCPCS

## 2024-11-22 PROCEDURE — 82947 ASSAY GLUCOSE BLOOD QUANT: CPT

## 2024-11-22 PROCEDURE — 99232 SBSQ HOSP IP/OBS MODERATE 35: CPT

## 2024-11-22 PROCEDURE — 99233 SBSQ HOSP IP/OBS HIGH 50: CPT | Performed by: INTERNAL MEDICINE

## 2024-11-22 PROCEDURE — 74230 X-RAY XM SWLNG FUNCJ C+: CPT | Performed by: RADIOLOGY

## 2024-11-22 PROCEDURE — 93010 ELECTROCARDIOGRAM REPORT: CPT | Performed by: INTERNAL MEDICINE

## 2024-11-22 PROCEDURE — 70450 CT HEAD/BRAIN W/O DYE: CPT | Performed by: RADIOLOGY

## 2024-11-22 PROCEDURE — 92526 ORAL FUNCTION THERAPY: CPT | Mod: GN

## 2024-11-22 PROCEDURE — 85025 COMPLETE CBC W/AUTO DIFF WBC: CPT | Performed by: NURSE PRACTITIONER

## 2024-11-22 PROCEDURE — 84155 ASSAY OF PROTEIN SERUM: CPT | Performed by: NURSE PRACTITIONER

## 2024-11-22 PROCEDURE — 84100 ASSAY OF PHOSPHORUS: CPT | Performed by: NURSE PRACTITIONER

## 2024-11-22 PROCEDURE — 74230 X-RAY XM SWLNG FUNCJ C+: CPT

## 2024-11-22 PROCEDURE — 70450 CT HEAD/BRAIN W/O DYE: CPT

## 2024-11-22 PROCEDURE — 1200000002 HC GENERAL ROOM WITH TELEMETRY DAILY

## 2024-11-22 PROCEDURE — 97535 SELF CARE MNGMENT TRAINING: CPT | Mod: GO

## 2024-11-22 RX ORDER — POTASSIUM CHLORIDE 14.9 MG/ML
20 INJECTION INTRAVENOUS
Status: COMPLETED | OUTPATIENT
Start: 2024-11-22 | End: 2024-11-22

## 2024-11-22 RX ORDER — PHENOBARBITAL 32.4 MG/1
32.4 TABLET ORAL EVERY 8 HOURS
Status: DISCONTINUED | OUTPATIENT
Start: 2024-11-22 | End: 2024-11-23

## 2024-11-22 RX ORDER — MAGNESIUM SULFATE HEPTAHYDRATE 40 MG/ML
2 INJECTION, SOLUTION INTRAVENOUS ONCE
Status: COMPLETED | OUTPATIENT
Start: 2024-11-22 | End: 2024-11-22

## 2024-11-22 RX ADMIN — METOPROLOL TARTRATE 5 MG: 5 INJECTION INTRAVENOUS at 09:34

## 2024-11-22 RX ADMIN — ENOXAPARIN SODIUM 50 MG: 60 INJECTION SUBCUTANEOUS at 22:10

## 2024-11-22 RX ADMIN — PHENOBARBITAL 32.4 MG: 32.4 TABLET ORAL at 18:11

## 2024-11-22 RX ADMIN — METOPROLOL TARTRATE 5 MG: 5 INJECTION INTRAVENOUS at 04:31

## 2024-11-22 RX ADMIN — THIAMINE HYDROCHLORIDE 100 MG: 100 INJECTION, SOLUTION INTRAMUSCULAR; INTRAVENOUS at 09:35

## 2024-11-22 RX ADMIN — METOPROLOL TARTRATE 5 MG: 5 INJECTION INTRAVENOUS at 00:19

## 2024-11-22 RX ADMIN — BARIUM SULFATE 5 ML: 400 PASTE ORAL at 11:59

## 2024-11-22 RX ADMIN — PHENOBARBITAL SODIUM 65 MG: 65 INJECTION INTRAMUSCULAR; INTRAVENOUS at 01:57

## 2024-11-22 RX ADMIN — BARIUM SULFATE 20 ML: 400 SUSPENSION ORAL at 11:57

## 2024-11-22 RX ADMIN — PHENOBARBITAL SODIUM 32.5 MG: 65 INJECTION INTRAMUSCULAR; INTRAVENOUS at 09:34

## 2024-11-22 RX ADMIN — POTASSIUM CHLORIDE 20 MEQ: 200 INJECTION, SOLUTION INTRAVENOUS at 09:34

## 2024-11-22 RX ADMIN — METOPROLOL TARTRATE 5 MG: 5 INJECTION INTRAVENOUS at 13:00

## 2024-11-22 RX ADMIN — CEFTRIAXONE SODIUM 1 G: 1 INJECTION, SOLUTION INTRAVENOUS at 18:12

## 2024-11-22 RX ADMIN — ENOXAPARIN SODIUM 50 MG: 60 INJECTION SUBCUTANEOUS at 09:34

## 2024-11-22 RX ADMIN — METOPROLOL TARTRATE 50 MG: 50 TABLET, FILM COATED ORAL at 22:10

## 2024-11-22 RX ADMIN — MAGNESIUM SULFATE HEPTAHYDRATE 2 G: 40 INJECTION, SOLUTION INTRAVENOUS at 06:39

## 2024-11-22 RX ADMIN — BARIUM SULFATE 125 ML: 0.81 POWDER, FOR SUSPENSION ORAL at 11:58

## 2024-11-22 RX ADMIN — POTASSIUM PHOSPHATE, MONOBASIC AND POTASSIUM PHOSPHATE, DIBASIC 15 MMOL: 224; 236 INJECTION, SOLUTION, CONCENTRATE INTRAVENOUS at 09:57

## 2024-11-22 RX ADMIN — POTASSIUM CHLORIDE 20 MEQ: 200 INJECTION, SOLUTION INTRAVENOUS at 06:39

## 2024-11-22 ASSESSMENT — LIFESTYLE VARIABLES
ORIENTATION AND CLOUDING OF SENSORIUM: DISORIENTED FOR PLACE OR PERSON
AUDITORY DISTURBANCES: NOT PRESENT
HEADACHE, FULLNESS IN HEAD: NOT PRESENT
ANXIETY: NO ANXIETY, AT EASE
PAROXYSMAL SWEATS: BARELY PERCEPTIBLE SWEATING, PALMS MOIST
TOTAL SCORE: 5
TOTAL SCORE: 5
NAUSEA AND VOMITING: NO NAUSEA AND NO VOMITING
AGITATION: NORMAL ACTIVITY
AUDITORY DISTURBANCES: NOT PRESENT
TREMOR: NO TREMOR
AUDITORY DISTURBANCES: NOT PRESENT
VISUAL DISTURBANCES: NOT PRESENT
TOTAL SCORE: 5
NAUSEA AND VOMITING: NO NAUSEA AND NO VOMITING
ANXIETY: NO ANXIETY, AT EASE
VISUAL DISTURBANCES: NOT PRESENT
ORIENTATION AND CLOUDING OF SENSORIUM: DISORIENTED FOR PLACE OR PERSON
PAROXYSMAL SWEATS: BARELY PERCEPTIBLE SWEATING, PALMS MOIST
NAUSEA AND VOMITING: NO NAUSEA AND NO VOMITING
TREMOR: NO TREMOR
AGITATION: NORMAL ACTIVITY
HEADACHE, FULLNESS IN HEAD: NOT PRESENT
PAROXYSMAL SWEATS: BARELY PERCEPTIBLE SWEATING, PALMS MOIST
ANXIETY: NO ANXIETY, AT EASE
AGITATION: NORMAL ACTIVITY
VISUAL DISTURBANCES: NOT PRESENT
ORIENTATION AND CLOUDING OF SENSORIUM: DISORIENTED FOR PLACE OR PERSON
TREMOR: NO TREMOR
HEADACHE, FULLNESS IN HEAD: NOT PRESENT

## 2024-11-22 ASSESSMENT — PAIN SCALES - WONG BAKER
WONGBAKER_NUMERICALRESPONSE: NO HURT

## 2024-11-22 ASSESSMENT — PAIN SCALES - GENERAL
PAINLEVEL_OUTOF10: 0 - NO PAIN

## 2024-11-22 ASSESSMENT — COGNITIVE AND FUNCTIONAL STATUS - GENERAL
MOVING TO AND FROM BED TO CHAIR: A LITTLE
MOBILITY SCORE: 15
STANDING UP FROM CHAIR USING ARMS: A LITTLE
EATING MEALS: A LITTLE
PERSONAL GROOMING: A LITTLE
CLIMB 3 TO 5 STEPS WITH RAILING: TOTAL
DAILY ACTIVITIY SCORE: 14
WALKING IN HOSPITAL ROOM: A LITTLE
MOVING FROM LYING ON BACK TO SITTING ON SIDE OF FLAT BED WITH BEDRAILS: A LITTLE
HELP NEEDED FOR BATHING: A LOT
TURNING FROM BACK TO SIDE WHILE IN FLAT BAD: A LOT
DRESSING REGULAR LOWER BODY CLOTHING: A LOT
DRESSING REGULAR UPPER BODY CLOTHING: A LOT
TOILETING: A LOT

## 2024-11-22 ASSESSMENT — PAIN - FUNCTIONAL ASSESSMENT
PAIN_FUNCTIONAL_ASSESSMENT: 0-10

## 2024-11-22 ASSESSMENT — ACTIVITIES OF DAILY LIVING (ADL)
LACK_OF_TRANSPORTATION: NO
HOME_MANAGEMENT_TIME_ENTRY: 13

## 2024-11-22 NOTE — PROGRESS NOTES
[facility]       Date:  11/22/2024  Patient:  Ivan Bravo  YOB: 1955  MRN:  [id number]   Admit Date:  11/19/2024  ========================================================================================================    Chief Complaint   Patient presents with    Altered Mental Status     PT PRESENTS TO THE ED VIA EMS FROM HOME AFTER NOT BEING ABLE TO GET OUT OF BED FOR TWO  DAYS AND NOT ACTING HIMSELF. PT HAS A HISTORY OF AFIB         History of Present Illness:  Ivan Bravo is a 69 y.o. year old male patient with Past Medical History of  pAfib on Pradaxa, ETOH abuse, malnutrition who was brought to [facility] on 11/19 for evaluation of mental status change worsening over the past two days, LKW 11/17 evening. In the ER he was also noted to be in Afib with RVR, initiated on Cardizem infusion and subsequently admitted to SDU with CIWA monitoring. Today, he was noted to be increasingly agitated/confused with CIWA 14-15, Afib with HR 90-120s, controlled with IV lopressor scheduled. Transfer to ICU for further management    CTH negative for acute process, scattered white matter hypodensities suggestive of small vessel ischemia     Interval ICU Events:  11/20: Transfer to ICU, Phenobarb IV taper with PRN, NPO status. Continue treatment with Azithro/Ceftriaxone. Monitor neuro status, no focal changes. Holding pradaxa with therapueitc Lovenox per cardiology. SLP/PT/OT therapies ordered.    11/21: Drowsy yesterday s/p medications for agitation. Mentation improving, when able to participate perform SLP/bedside swallow evaluation. Will need PT/OT. Cardiology scheduled metop, TTE normal.    11/22: Remains in Afib, HR  bpm. MBS today, advanced diet. Will continue PO meds, convert to PO phenobarb taper. OOB today, CIWA=0. OK to transfer     Medical History:  History reviewed. No pertinent past medical history.  History reviewed. No pertinent surgical history.  Medications Prior to  Admission   Medication Sig Dispense Refill Last Dose/Taking    cyclobenzaprine (Flexeril) 10 mg tablet Take 1 tablet (10 mg) by mouth 3 times a day as needed.   Taking As Needed    dabigatran etexilate (Pradaxa) 150 mg capsule Take 1 capsule (150 mg) by mouth.   Taking    gabapentin (Neurontin) 400 mg capsule Take 1 capsule (400 mg) by mouth.   Taking    metoprolol tartrate (Lopressor) 25 mg tablet Take 0.5 tablets (12.5 mg) by mouth.   Taking     Oxycodone  Social History     Tobacco Use    Smoking status: Unknown   Substance Use Topics    Alcohol use: Yes     Alcohol/week: 12.0 standard drinks of alcohol     Types: 12 Cans of beer per week    Drug use: Not Currently     No family history on file.    Review of Systems:  14 point review of systems was completed and negative except for those specially mention in my HPI    Physical Exam:    Heart Rate:  []   Temp:  [35.6 °C (96.1 °F)-35.8 °C (96.4 °F)]   Resp:  [21-30]   BP: (114-175)/()   Weight:  [53.3 kg (117 lb 8.1 oz)]   SpO2:  [94 %-98 %]     Physical Exam  Vitals and nursing note reviewed.   Constitutional:       Appearance: He is underweight. He is ill-appearing.   HENT:      Mouth/Throat:      Pharynx: Oropharynx is clear.   Eyes:      Pupils: Pupils are equal, round, and reactive to light.   Cardiovascular:      Rate and Rhythm: Regular rhythm. Tachycardia present.      Pulses: Normal pulses.   Pulmonary:      Effort: Pulmonary effort is normal.      Breath sounds: Normal breath sounds.   Abdominal:      General: Abdomen is flat. There is no distension.      Palpations: Abdomen is soft.      Tenderness: There is no abdominal tenderness.   Musculoskeletal:         General: Normal range of motion.      Right lower leg: No edema.      Left lower leg: No edema.   Skin:     General: Skin is warm.      Capillary Refill: Capillary refill takes less than 2 seconds.   Neurological:      General: No focal deficit present.      Mental Status: He is  disoriented.   Psychiatric:         Judgment: Judgment is impulsive.         Objective:    I have reviewed all medications, laboratory results, and imaging pertinent for today's encounter    Assessment/Plan:    I am currently managing this critically ill patient for the following problems:    Neuro/Psych/Pain Ctrl/Sedation:  Alcohol withdrawal drinks 3-4 beers daily, Ethanol level <10 on admission  Metabolic encephalopathy  THC use medical marijuana gummies HS  Phenobarb taper PO  Thiamine/MV/folic acid  [facility] ICU, CIWA scoring    Respiratory/ENT:  Pulmonary nodules s/p bronch/biopsy with [facility] 9/2024  Current nicotine smoker  No O2 needs  Maintain SpO2 >92%  Nicotine patch if needed    Cardiovascular:  Afib RVR newly diagnosed Afib s/p Covid in 9/2024  Pradaxa on hold NPO, Lovenox Q12  Resume home PO meds  TTE: EF 50-55%, underlying atrial fibrillation/limited study   Cardiology consulted    GI:  Severe protein calorie malnutrition  Dysphagia  SLP eval  MBS complete  Soft, bite-size meals with thin liquids    Renal/Volume Status (Intra & Extravascular):  YOLY resolved  Hypokalemia, hypomagnesemia  Replete electrolytes  Daily CMP, Mg  Monitor UOP    Endocrine  TSH/A1C normal  POCT Q6 while NPO  Hypoglycemia precautions    Infectious Disease:  CAP  SIRS  CXR- bilateral opacities  Continue Ceftriaxone  UA negative  Antigens negative  BC NGTD    Heme/Onc:  BAL performed at [facility]  Daily CBC    MSK:  Uses walker/electric wheelchair at baseline  PT/OT    Ethics/Code Status:  Full Code    :  DVT Prophylaxis: Lovenox  GI Prophylaxis: None  Bowel Regimen: None  Diet: Bite size/thins  CVC: None  Denisha: None  Ivory: None  Restraints: None  Dispo: OK to transfer    30 minutes spent in preparing to see patient (I.e. review of medical records), evaluation of diagnostics (I.e. labs, imaging, etc.), documentation, discussing plan of care with patient/ family/ caregiver, and/ or coordination of care with multidisciplinary  team. Time does not include completion of procedure time.      Sandi Elizabeth, APRN-CNP

## 2024-11-22 NOTE — PROGRESS NOTES
[facility] Progress Note           Rounding MARIBETH/Cardiologist:  JOON Calderon, Dr. Luna  Primary Cardiologist: Dr. Ricardo Luna    Date:  11/22/2024  Patient:  Ivan Bravo  YOB: 1955  MRN:  [id number]   Admit Date:  11/19/2024      SUBJECTIVE:    11/22/24  Remains in ICU, answers questions, follows commands.  Telemetry A-fib rate   Slightly hypertensive    Echo  CONCLUSIONS:   1. Left ventricular ejection fraction is low normal, by visual estimate at 50-55%.   2. Abnormal wall motion.   3. Left ventricular diastolic filling was indeterminate.   4. Underlying atrial fibrillation.      Moderate hypokinesis of the basal inferior wall.   5. There is normal right ventricular global systolic function.   6. Trivial tricuspid regurgitation.   7. Comparison study dated 5/11/20 showed an LVEF 60%. Trivial MR and       VITALS:     Vitals:    11/22/24 0700 11/22/24 0800 11/22/24 0900 11/22/24 1000   BP: (!) 159/100 (!) 137/93 (!) 156/103 (!) 175/106   BP Location:       Patient Position:       Pulse: 100 100 100 92   Resp: 22 (!) 28 22 (!) 30   Temp:       TempSrc:       SpO2: 97%      Weight:       Height:           Intake/Output Summary (Last 24 hours) at 11/22/2024 1045  Last data filed at 11/22/2024 0600  Gross per 24 hour   Intake 291.67 ml   Output 950 ml   Net -658.33 ml       Wt Readings from Last 4 Encounters:   11/22/24 53.3 kg (117 lb 8.1 oz)       CURRENT HOSPITAL MEDICATIONS:   cefTRIAXone, 1 g, intravenous, q24h  [Held by provider] dabigatran etexilate, 150 mg, oral, BID  enoxaparin, 1 mg/kg, subcutaneous, q12h REBECCA  metoprolol, 5 mg, intravenous, q4h  [Held by provider] metoprolol tartrate, 50 mg, oral, BID  multivitamin with minerals, 1 tablet, oral, Daily  PHENobarbital, 32.5 mg, intravenous, q8h  potassium chloride, 20 mEq, intravenous, q2h  potassium phosphate, 15 mmol, intravenous, Once  sodium chloride, 500 mL, intravenous, Once  [START ON 11/23/2024]  thiamine, 100 mg, oral, Daily  thiamine, 100 mg, intravenous, Daily         Current Outpatient Medications   Medication Instructions    cyclobenzaprine (FLEXERIL) 10 mg, 3 times daily PRN    dabigatran etexilate (PRADAXA) 150 mg    gabapentin (NEURONTIN) 400 mg    metoprolol tartrate (LOPRESSOR) 12.5 mg        PHYSICAL EXAMINATION:   GENERAL APPEARANCE: Ill appearance  CHEST: Symmetric and non-tender.  INTEGUMENT: Skin warm and dry, without gross excoriationis or lesions.  HEENT: No gross abnormalities of conjunctiva, teeth, gums, oral mucosa  NECK: Supple, no JVD, no bruit. Thyroid not palpable. Carotid upstrokes normal.  NEURO/PSHCY: Eyes open hands squeezes on command  LUNGS: Bilateral rhonchi  HEART: S1, S2 irregular  ABDOMEN: Soft, nontender, no palpable hepatosplenomegaly, no mases, no bruits. Abdominal aorta not noted to be enlarged.  EXTREMITIES: Warm with good color, no clubbing or cyanois. There is no edema noted.  PERIPHERAL VASCULAR: Pulses present and equally palpable; 1+ throughout. No femoral bruits      LAB DATA:     CBC:   Results from last 7 days   Lab Units 11/22/24  0420 11/21/24 0438 11/20/24 0524   WBC AUTO x10*3/uL 10.3 11.1 13.7*   RBC AUTO x10*6/uL 5.50 5.66 4.88   HEMOGLOBIN g/dL 17.0 17.6* 15.1   HEMATOCRIT % 52.0 54.1* 46.2   MCV fL 95 96 95   MCH pg 30.9 31.1 30.9   MCHC g/dL 32.7 32.5 32.7   RDW % 14.2 14.1 14.1   PLATELETS AUTO x10*3/uL 306 259 378     CMP:    Results from last 7 days   Lab Units 11/22/24  0420 11/21/24  0438 11/20/24  1632 11/20/24 0524 11/19/24  0343   SODIUM mmol/L 138 136 136   < > 132*   POTASSIUM mmol/L 3.5 3.5 3.6   < > 3.9   CHLORIDE mmol/L 99 96* 98   < > 92*   CO2 mmol/L 27 27 24   < > 21   BUN mg/dL 9 10 13   < > 15   CREATININE mg/dL 0.94 1.02 1.23   < > 1.19   GLUCOSE mg/dL 71* 97 163*   < > 71*   PROTEIN TOTAL g/dL 7.6 8.0  --   --  9.0*   CALCIUM mg/dL 9.5 9.6 9.2   < > 9.9   BILIRUBIN TOTAL mg/dL 0.8 0.8  --   --  1.2   ALK PHOS U/L 71 74  --   --   88   AST U/L 25 28  --   --  19   ALT U/L 6* 7*  --   --  5*    < > = values in this interval not displayed.     BMP:    Results from last 7 days   Lab Units 11/22/24  0420 11/21/24  0438 11/20/24  1632   SODIUM mmol/L 138 136 136   POTASSIUM mmol/L 3.5 3.5 3.6   CHLORIDE mmol/L 99 96* 98   CO2 mmol/L 27 27 24   BUN mg/dL 9 10 13   CREATININE mg/dL 0.94 1.02 1.23   CALCIUM mg/dL 9.5 9.6 9.2   GLUCOSE mg/dL 71* 97 163*     Magnesium:  Results from last 7 days   Lab Units 11/22/24  0420 11/21/24  0438 11/20/24  1632   MAGNESIUM mg/dL 1.87 2.15 2.59*     Troponin:    Results from last 7 days   Lab Units 11/20/24  0524 11/19/24  0343   TROPHS ng/L 32* 10     BNP:   Results from last 7 days   Lab Units 11/19/24  0315   BNP pg/mL 278*     Lipid Panel:         DIAGNOSTIC TESTING:   @No results found for this or any previous visit.      ECG 12 Lead    Result Date: 11/20/2024  Atrial fibrillation with premature ventricular or aberrantly conducted complexes Septal infarct (cited on or before 09-MAY-2020) Abnormal ECG When compared with ECG of 19-NOV-2024 06:55, T wave inversion no longer evident in Inferior leads T wave inversion no longer evident in Lateral leads Confirmed by Isac Pereira ([id number]) on 11/20/2024 12:05:30 PM    ECG 12 lead    Result Date: 11/19/2024  Atrial fibrillation Rightward axis Anteroseptal infarct (cited on or before 09-MAY-2020) Marked ST abnormality, possible inferior subendocardial injury Abnormal ECG When compared with ECG of 19-NOV-2024 06:02, (unconfirmed) No significant change was found See ED provider note for full interpretation and clinical correlation Confirmed by Leslee Del Angel ([id number]) on 11/19/2024 11:04:23 AM    ECG 12 lead    Result Date: 11/19/2024  Atrial fibrillation with rapid ventricular response with premature ventricular or aberrantly conducted complexes Anteroseptal infarct (cited on or before 09-MAY-2020) Marked ST abnormality, possible inferior subendocardial injury  Abnormal ECG When compared with ECG of 09-MAY-2020 09:47, ST more depressed in Inferior leads Inverted T waves have replaced nonspecific T wave abnormality in Inferior leads T wave inversion now evident in Lateral leads See ED provider note for full interpretation and clinical correlation Confirmed by Leslee Del Angel ([id number]) on 11/19/2024 11:03:59 AM    ECG 12 lead    Result Date: 11/19/2024  Atrial fibrillation with rapid ventricular response Rightward axis Anteroseptal infarct (cited on or before 09-MAY-2020) Marked ST abnormality, possible inferior subendocardial injury Abnormal ECG When compared with ECG of 19-NOV-2024 05:59, (unconfirmed) No significant change was found See ED provider note for full interpretation and clinical correlation Confirmed by Leslee Del Angel ([id number]) on 11/19/2024 11:03:40 AM    XR chest 1 view    Result Date: 11/19/2024  Interpreted By:  Finkelstein, Evan, STUDY: XR CHEST 1 VIEW;  11/19/2024 5:24 am   INDICATION: Signs/Symptoms:aspiration.     COMPARISON: Chest radiograph 05/09/2020   ACCESSION NUMBER(S): [id number]   ORDERING CLINICIAN: PILAR EASLEY   FINDINGS: Median sternotomy wires are present.   CARDIOMEDIASTINAL SILHOUETTE: Cardiomediastinal silhouette is stable in size and configuration.   LUNGS: Retrocardiac airspace opacity. Skin folds overlie the right hemithorax. No sizable pleural effusion or pneumothorax.   ABDOMEN: No remarkable upper abdominal findings.   BONES: No acute osseous abnormality.       Retrocardiac airspace opacity which may represent atelectasis or pneumonia/aspiration in the appropriate clinical setting.   MACRO: None.   Signed by: Evan Finkelstein 11/19/2024 6:21 AM Dictation workstation:   [id number]    CT head wo IV contrast    Result Date: 11/19/2024  Interpreted By:  Finkelstein, Evan, STUDY: CT HEAD WO IV CONTRAST;  11/19/2024 5:23 am   INDICATION: Signs/Symptoms:ams.     COMPARISON: CT brain 05/12/2020   ACCESSION NUMBER(S):  [id number]   ORDERING CLINICIAN: PILAR EASLEY   TECHNIQUE: Axial noncontrast CT images of the head with coronal and sagittal reconstructions.   FINDINGS: EXTRACRANIAL SOFT TISSUES: Unremarkable.   CALVARIUM: No depressed skull fracture. No destructive osseous lesion.   PARANASAL SINUSES/MASTOIDS: The visualized paranasal sinuses and mastoid air cells are aerated.   HEMORRHAGE: No acute intracranial hemorrhage.   BRAIN PARENCHYMA: Gray-white matter interfaces are preserved. No mass effect or midline shift. There are nonspecific scattered white matter hypodensities.   VENTRICLES and EXTRA-AXIAL SPACES: Parenchymal atrophy with prominence of the ventricles and cortical sulci.   OTHER FINDINGS: There are calcifications within the cavernous carotids       No acute intracranial hemorrhage, mass effect or midline shift.   Nonspecific scattered white matter hypodensities favored to represent sequela of small vessel ischemia.     MACRO: None.   Signed by: Evan Finkelstein 11/19/2024 5:31 AM Dictation workstation:   [id number]       Transthoracic Echo (TTE) Complete   Final Result      XR chest 1 view   Final Result   Retrocardiac airspace opacity which may represent atelectasis or   pneumonia/aspiration in the appropriate clinical setting.        MACRO:   None.        Signed by: Evan Finkelstein 11/19/2024 6:21 AM   Dictation workstation:   [id number]      CT head wo IV contrast   Final Result   No acute intracranial hemorrhage, mass effect or midline shift.        Nonspecific scattered white matter hypodensities favored to represent   sequela of small vessel ischemia.             MACRO:   None.        Signed by: Evan Finkelstein 11/19/2024 5:31 AM   Dictation workstation:   [id number]      FL modified barium swallow study    (Results Pending)           RADIOLOGY:     Transthoracic Echo (TTE) Complete   Final Result      XR chest 1 view   Final Result   Retrocardiac airspace opacity which may represent atelectasis  or   pneumonia/aspiration in the appropriate clinical setting.        MACRO:   None.        Signed by: Evan Finkelstein 11/19/2024 6:21 AM   Dictation workstation:   [id number]      CT head wo IV contrast   Final Result   No acute intracranial hemorrhage, mass effect or midline shift.        Nonspecific scattered white matter hypodensities favored to represent   sequela of small vessel ischemia.             MACRO:   None.        Signed by: Evan Finkelstein 11/19/2024 5:31 AM   Dictation workstation:   [id number]      FL modified barium swallow study    (Results Pending)       PROBLEM LIST     Patient Active Problem List   Diagnosis    Atrial fibrillation with RVR (Multi)       ASSESSMENT:   Metabolic encephalopathy  A-fib with RVR  Hypertension  Substance abuse  EF 50 to 55%        PLAN:   Patient seen and examined in conjunction with NATHALIE Beckwith and agree with the evaluation as noted above.     I have personally interviewed and examined the patient.   I have personally and independently reviewed labs and diagnostic testing.  I have personally verified the elements of the history and physical listed above and changes, if any, are noted.      69-year-old with a history of paroxysmal atrial fibrillation, hypertension substance abuse was brought in by his wife because of increased unresponsiveness and he was not acting like himself.  He has a long standing history of atrial fibrillation for which she was previously on Lopressor and Pradaxa for anticoagulation.  It appears that the Lopressor was recently discontinued at the [facility].  He also has a significant history of alcohol abuse and appears to be in active alcohol withdrawal so he was transferred to the ICU.     Agree with examination as noted above.  Patient is sedated but arousable, with significant mouth breathing.  Cardiac exam reveals irregularly irregular S1-S2, with no murmurs appreciated.  Chest reveals reduced breath sounds at the lung  bases bilaterally.     ASSESSMENT AND PLAN:  1.  Persistent atrial fibrillation: Appears multifactorial, in setting of significant EtOH abuse and withdrawal.  The rate appears improved on current IV Lopressor.  Agree with plans for continued IV hydration and supportive treatment for early alcohol withdrawal.  Will continue current anticoagulation strategy with Lovenox.  Will get a 2D echocardiogram for further evaluation.  We will not pursue cardioversion at this time due to active ongoing with the lower, but consider elective cardioversion when patient is more stable.  2.  Active substance abuse: Agree with current supportive treatment per ICU team.    11/21/24  ICU monitoring  Lovenox 1 mg/kg every 12 hours  Lopressor 5 mg every 4 hours for rate control  Keep magnesium greater than 2.0  Keep potassium between 4.0 and 4.5  Cardiology continue to follow along    30 minutes    Alexis Barajas CNP  [facility]    11/22/24  Continue telemetry monitoring.  Telemetry shows atrial fibrillation however rates are better controlled.  Continue with IV Lopressor  Monitor electrolytes, keep potassium greater than 4 and magnesium greater than 2  Continue therapeutic dose Lovenox for now.  Will benefit from p.o. anticoagulation when able to take p.o. meds  CIWA protocol with phenobarbital per ICU team  Would transition to p.o. Lopressor when able to take p.o. meds  Resume p.o. antihypertensives when able  Message sent to schedulers to follow-up outpatient with Dr. Luna  Can consider cardioversion at a later date.  General Cardiology to sign off please reconsult for any further needs      Michele Lund [facility]  Adult Gerontology Acute Care Nurse Practitioner  [facility] [place]   [facility]  [phone]      Of note, this documentation is completed using the Dragon Dictation system (voice recognition software). There  may be spelling and/or grammatical errors that were not corrected prior to final submission.    Please do not hesitate to call with questions.  Electronically signed by NATHALIE Calderon-CNP, on 11/22/2024 at 10:45 AM    Patient seen and examined in conjunction with NATHALIE Doran/CNP and agree with the evaluation as noted above.  Patient feels better, remains in A-fib with controlled ventricular response.  He appears more alert and is tolerating clear liquids.  At this time, we will continue current medications and patient can be switched to p.o. meds when cleared.  He can be evaluated as an outpatient for possible eventual elective cardioversion.  General cardiology will sign off at this time.  AKA
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
## 2024-11-22 NOTE — PROGRESS NOTES
Speech-Language Pathology    Inpatient Modified Barium Swallow Study    Patient Name: Ivan Bravo  MRN: [id number]  : 1955  Today's Date: 24  Time Calculation  Start Time: 1110  Stop Time: 1140  Time Calculation (min): 30 min      -A      Modified Barium Swallow Study completed. Informed verbal consent obtained prior to completion of exam. Trials of thin, nectar/mildly thick liquid,  puree, and regular solids were given.     Modified Barium Swallow Study completed. Informed verbal consent obtained prior to completion of exam. The study was completed per protocol with various liquid barium consistencies, pudding, solids and a 13mm barium tablet. A 1.9 cm or .75 inch (outer diameter) ring was placed on the chin in the lateral view and on the lateral, left side of the neck in the a-p view in order to complete objective measurements during swallowing. The anatomic structures and function of the oropharynx, larynx, hypopharynx and cervical esophagus were evaluated.      SLP: MELINA Paiz   Contact info: [employer] secure chat; phone: [phone]      Reason for Referral: A modified barium swallow study was recommended at bedside swallow evaluation was completed yesterday due to signs symptoms aspiration with thin liquids and no signs symptoms aspiration with nectar thick liquids or solid foods.  MBS recommended to further assess physiology of the swallow and determine if patient can tolerate highest/safest p.o. diet.  Patient Hx: Patient is a 69 y.o. year old male patient with Past Medical History of  pAfib on Pradaxa, ETOH abuse, malnutrition who [place] [facility] [place] evaluation of mental status change worsening over the past two days, LKW  evening. In the ER he was also noted to be in Afib with RVR, initiated on Cardizem infusion and subsequently admitted to SDU with CIWA monitoring.   Respiratory Status: Nasal cannula  Current diet: Puréed diet with nectar thick liquids and  no straws    Pain:  Pain Scale: 0-10  Ratin    FINAL SPEECH RECOMMENDATIONS    DIET:   - Soft & Bite Sized (IDDSI Level 6)  - Thin liquids (IDDSI Level 0)    STRATEGIES:  - Small bites  - Small, single sips  - Alternate consistencies  - Effortful swallow  - Upright for all PO intake  - Swallow 2-3 times per bite/sip  - No straws    Plan:  Treatment/Interventions: Pharyngeal exercises, Patient/family education, Bolus trials, Compensatory strategy training, Diet tolerance/advancement  SLP Plan: Skilled SLP warranted  SLP Frequency: 3x per week  Duration: 30 days    Discussed POC: Patient  Discussed Risks/Benefits: Yes  Patient/Caregiver Agreeable: Yes    Short term goals established 24:     1.  Patient will be able to tolerate soft bite-size diet with thin liquids without overt signs symptoms aspiration or pulmonary compromise.  Start Date: 24  Progress: Patient tolerated soft bite-size and thin liquids during modified barium swallow study without aspiration.  He had deep penetration with straw sips of thin liquids that cleared without evidence of aspiration.  Status: Continue goal    2.  Patient will be able to use compensatory swallowing strategies including sitting upright while eating, slow rate of intake, alternating bites/sips and no straws.  Start Date: 24  Progress: Patient educated regarding use of compensatory swallowing strategies and not using straws with thin liquids.  Status: Continue goal    3.  Patient will be able to complete base of tongue techniques 2-3 times per day independently including resistive tongue techniques, Joie technique, effortful swallow technique, chin tuck to resistance and Shaker technique.  Start Date: Initiate at next treatment session  Progress: N/A  Status: Continue goal    Long term goals 24:   Patient will tolerate the least restrictive diet without overt difficulty or further pulmonary compromise by time of discharge.    Education Provided:  Results and recommendations per MBSS, with video review; recommendations and POC at this time. Verbal understanding and agreement given on all accounts.     Treatment Provided Today: ST provided extensive education to pt/pt family regarding anatomy/physiology of swallow function, risk of aspiration/aspiration pna, diet modifications, and the use of compensatory swallow strategies to promote pt safety upon PO intake including small bites/sips, no straws, sitting upright while eating and decreased rate of intake in addition, ST provided instruction/training on oral and pharyngeal exercises (re: effortful swallow, Joie technique, resistive tongue techniques and chin tuck to resistance).       Mechanics of the Swallow Summary:  ORAL PHASE:  Lip Closure - Interlabial escape/no progression to anterior lip   Tongue Control During Bolus Hold - Escape to lateral buccal cavity and/or floor of mouth   Bolus prep/mastication - Disorganized mastication with solid pieces of bolus unchewed   Bolus transport/lingual motion - Slowed Tongue Motion for A-P movement of the bolus   Oral residue - Residue collection on oral structure     PHARYNGEAL PHASE:  Initiation of pharyngeal swallow - Bolus head at pit of pyriforms   Soft palate elevation - No bolus between soft palate/pharyngeal wall   Laryngeal elevation - Complete superior movement of thyroid cartilage with contact of arytenoids to epiglottic petiole   Anterior hyoid excursion - Complete anterior movement   Epiglottic movement - Partial inversion  Laryngeal vestibule closure - Incomplete - narrow column of air/contrast in laryngeal vestibule   Pharyngeal stripping wave - Present, however, diminished   Pharyngeal contraction (A/P view) - Complete  Pharyngoesophageal segment opening - Partial distension/partial duration with partial obstruction of flow of bolus   Tongue base retraction - Narrow column of contrast or air between tongue base and pharyngeal wall   Pharyngeal  residue - Collection of residue within or on the pharyngeal structures     ESOPHAGEAL PHASE:  Esophageal clearance -  20 mL thin liquid trial Complete clearance   Puree consistency trial Not evaluated   Barium tablet trial Not evaluated       SLP Impressions with Severity Rating:   Pt presents with mild/moderate oropharyngeal dysphagia upon completion of modified barium swallow study this date. Swallowing physiology is detailed above. Impairments most impacting swallowing safety and efficiency include bolus prep/mastication, bolus transport/lingual motion and initiation of the pharyngeal swallow.  Patient also had reduced epiglottic inversion and reduced laryngeal vestibule closure with thin liquids. Patient demonstrated penetration with suspected aspiration post swallow with thin liquids via straw due to deep penetration.  He also had penetration that cleared without evidence of aspiration with thin liquids via cup for both single and multiple sips.  No evidence of aspiration seen with cup sips of thin liquids.  He was also able to tolerate nectar thick liquids and had penetration with nectar thick liquids that cleared without evidence of aspiration.  No further penetration was observed for any other consistency. Patient demonstrated mild-moderate oral and pharyngeal residue that was reduced with second and third swallow.  Recommending a soft bite-size diet with thin liquids and no straws.    Strategies attempted-   Effortful swallow   Double swallow    OUTCOME MEASURES:  Functional Oral Intake Scale  Functional Oral Intake Scale: Level 6        total oral diet with multiple consistencies without special preparations but specific food limitations       Rosenbek's Penetration Aspiration Scale  Thin Liquids cup sips: 3. PENETRATION with LOW ASPIRATION risk - contrast remains above vocal cords, visible residue]   Thin Liquids straw sips: 5. DEEP PENETRATION with HIGH ASPIRATION risk - contrast contacts vocal cords,  visible residue  Nectar Thick Liquids: 3. PENETRATION with LOW ASPIRATION risk - contrast remains above vocal cords, visible residue]   Puree: 1. NO ASPIRATION & NO PENETRATION - no aspiration, contrast does not enter airway  Soft Solids: 1. NO ASPIRATION & NO PENETRATION - no aspiration, contrast does not enter airway  Solids: 1. NO ASPIRATION & NO PENETRATION - no aspiration, contrast does not enter airway    This portion of the study signed by Parker Pugh SLP on 11/22/24.

## 2024-11-22 NOTE — PROGRESS NOTES
Ivan Bravo was assessed by a Substance Use Navigator Specialist (SUNS) at 2:57 PM     Contact Number obtained during encounter:     Medical History: History reviewed. No pertinent past medical history.     Psychiatric History:  NA    Social History:   Social History     Socioeconomic History    Marital status: Legally      Spouse name: Not on file    Number of children: Not on file    Years of education: Not on file    Highest education level: Not on file   Occupational History    Not on file   Tobacco Use    Smoking status: Unknown    Smokeless tobacco: Not on file   Substance and Sexual Activity    Alcohol use: Yes     Alcohol/week: 12.0 standard drinks of alcohol     Types: 12 Cans of beer per week    Drug use: Not Currently    Sexual activity: Defer   Other Topics Concern    Not on file   Social History Narrative    Not on file     Social Drivers of Health     Financial Resource Strain: Low Risk  (11/22/2024)    Overall Financial Resource Strain (CARDIA)     Difficulty of Paying Living Expenses: Not hard at all   Food Insecurity: Patient Unable To Answer (11/19/2024)    Hunger Vital Sign     Worried About Running Out of Food in the Last Year: Patient unable to answer     Ran Out of Food in the Last Year: Patient unable to answer   Transportation Needs: No Transportation Needs (11/22/2024)    PRAPARE - Transportation     Lack of Transportation (Medical): No     Lack of Transportation (Non-Medical): No   Physical Activity: Patient Unable To Answer (11/19/2024)    Exercise Vital Sign     Days of Exercise per Week: Patient unable to answer     Minutes of Exercise per Session: Patient unable to answer   Stress: Patient Unable To Answer (11/19/2024)    [employer] [place] [employer] - Occupational Stress Questionnaire     Feeling of Stress : Patient unable to answer   Social Connections: Patient Unable To Answer (11/19/2024)    Social Connection and Isolation Panel [NHANES]     Frequency of  Communication with Friends and Family: Patient unable to answer     Frequency of Social Gatherings with Friends and Family: Patient unable to answer     Attends Latter day Services: Patient unable to answer     Active Member of Clubs or Organizations: Patient unable to answer     Attends Club or Organization Meetings: Patient unable to answer     Marital Status: Patient unable to answer   Intimate Partner Violence: Patient Unable To Answer (11/19/2024)    Humiliation, Afraid, Rape, and Kick questionnaire     Fear of Current or Ex-Partner: Patient unable to answer     Emotionally Abused: Patient unable to answer     Physically Abused: Patient unable to answer     Sexually Abused: Patient unable to answer   Housing Stability: Low Risk  (11/22/2024)    Housing Stability Vital Sign     Unable to Pay for Housing in the Last Year: No     Number of Times Moved in the Last Year: 0     Homeless in the Last Year: No        UDS / Tox panel results:   NA    Substance(s) used: Alcohol. Amount (shots/glasses/beers per day/week, etc.) : NA. Frequency: NA. Last used: NA. Hx of withdrawal sx: NA. Hx of seizures: NA. Hx of delirium tremens: NA.     Brief Summary of Assessment:   SUNS talked with pt and pt partner at bedside. Pt said they drink 2-3 beers a day. Pt said they will not drinking. Pt partner said pt has been drinking their entire life. Pt partner said pt has never received any kind of treatment for their drinking. Pt said about 7 years ago pt was a very heavy drinker but has cut back their drinking a lot. Pt drinks to drink and does not struggle with depression. Pt was in the [employer] when they were younger and said they loved in. Partner said pt plays slots all day on the computer while watching TV.       Diagnosis / Diagnostic Impression:   ALCOHOL ABUSE DISORDER     Summary / Plan:  SUNS gave pt and pt partner SUNS card.     Patient interested in treatment: No        Transportation Provided: NA

## 2024-11-22 NOTE — PROGRESS NOTES
"Occupational Therapy    OT Treatment    Patient Name: Ivan Bravo  MRN: [id number]  Department: [facility]  Room: 14/14-A  Today's Date: 11/22/2024  Time Calculation  Start Time: 1021  Stop Time: 1047  Time Calculation (min): 26 min        Assessment:  OT Assessment: Session focused on increasing IND with functional mobility, transfers, and ADL tasks. Pt with improved cognitive status and alertness this date.  Evaluation/Treatment Tolerance: Patient tolerated treatment well  Medical Staff Made Aware: Yes  End of Session Communication: Bedside nurse  End of Session Patient Position: Up in chair, Alarm on  Evaluation/Treatment Tolerance: Patient tolerated treatment well  Medical Staff Made Aware: Yes  Plan:  Treatment Interventions: ADL retraining, Functional transfer training, UE strengthening/ROM, Endurance training, Cognitive reorientation  OT Frequency: 2 times per week  OT Discharge Recommendations: Moderate intensity level of continued care  OT - OK to Discharge: Yes (Once medically appropriate.)  Treatment Interventions: ADL retraining, Functional transfer training, UE strengthening/ROM, Endurance training, Cognitive reorientation    Subjective   Previous Visit Info:  OT Last Visit  OT Received On: 11/22/24  General:  General  Reason for Referral: ADL impairment  Referred By: Sandi DUPREE  Past Medical History Relevant to Rehab: A fib, Hidradenitis suppurativa, Lung nodules, Medical marijuana, ETOH abuse  Family/Caregiver Present: No  Co-Treatment: PT  Co-Treatment Reason: Co-treat to maximize patient and staff safety with transfers/amb.  Prior to Session Communication: Bedside nurse  Patient Position Received: Bed, 3 rail up  General Comment: Pt pleasant and cooperative, agreeable to therapy. Prefers to be called \"Remington\". (Ronnell, brenda)  Precautions:  Medical Precautions: Fall precautions    Pain:  Pain Assessment  Pain Assessment: 0-10 (c/o pain in LUE where IV site is. Did not rate pain, " nursing aware.)    Objective    Cognition:  Cognition  Orientation Level: Disoriented to place, Disoriented to time, Disoriented to situation  Processing Speed: Delayed     Activities of Daily Living: Grooming  Grooming Comments: Pt combed hair with S/U for retrieval of items while seated.    LE Dressing  LE Dressing: Yes  LE Dressing Comments: Pt required MAX A to thread BLE in pants while seated EOB and pull over hips in supported stance at FWW.     Bed Mobility/Transfers: Bed Mobility  Bed Mobility: Yes (Supine to sit EOB with MIN Ax2 and bed slightly elevated, assist managing BLE OOB and elevating trunk. Cues for technique.)    Transfers  Transfer: Yes (x2 sit to/from stand using FWW with MIN Ax2. Cues for hand placement and technique)      Functional Mobility:  Functional Mobility  Functional Mobility Performed: Yes (Pt completed functional mobility x2 trials using FWW with MIN Ax2, 3' first trial and 8' second trial. Pt required max vc for technique, sequencing, and safety using FWW.)  Sitting Balance:  Static Sitting Balance  Static Sitting-Comment/Number of Minutes: Pt tolerated sitting EOB for ~5 minutes with fair + / fair static sitting balance.  Standing Balance:  Static Standing Balance  Static Standing-Comment/Number of Minutes: fair-  Dynamic Standing Balance  Dynamic Standing-Comments: fair-       Outcome Measures:[facility] Daily Activity  Putting on and taking off regular lower body clothing: A lot  Bathing (including washing, rinsing, drying): A lot  Putting on and taking off regular upper body clothing: A lot  Toileting, which includes using toilet, bedpan or urinal: A lot  Taking care of personal grooming such as brushing teeth: A little  Eating Meals: A little  Daily Activity - Total Score: 14    Education Documentation  Body Mechanics, taught by Ana Lilia Mancuso OT at 11/22/2024 12:37 PM.  Learner: Patient  Readiness: Acceptance  Method: Explanation, Demonstration  Response: Verbalizes  Understanding, Demonstrated Understanding, Needs Reinforcement    IP EDUCATION:  Education  Individual(s) Educated: Patient  Education Comment: Safety and techniques during functional mobility/transfers    Goals:  Encounter Problems       Encounter Problems (Active)       OT Goals       Patient will complete functional transfers at a mod A level.  (Progressing)       Start:  11/21/24    Expected End:  12/05/24            Patient will demonstrate fair + dynamic sitting balance and fair - static standing balance during functional tasks. (Progressing)       Start:  11/21/24    Expected End:  12/05/24            Patient will tolerate sitting EOB greater than 8 minutes during functional tasks. (Progressing)       Start:  11/21/24    Expected End:  12/05/24            Patient will complete grooming with setup/S.  (Progressing)       Start:  11/21/24    Expected End:  12/05/24            Patient will complete upper body dressing with setup/S. (Progressing)       Start:  11/21/24    Expected End:  12/05/24

## 2024-11-22 NOTE — DOCUMENTATION CLARIFICATION NOTE
"    PATIENT:               SAWYER LIN  ACCT #:                  [id number]  MRN:                       [id number]  :                       1955  ADMIT DATE:       2024 2:50 AM  DISCH DATE:  RESPONDING PROVIDER #:        [id number]          PROVIDER RESPONSE TEXT:    Sepsis with multi-system organ dysfunction of YOLY and metabolic encephalopathy not poa    CDI QUERY TEXT:    Clarification        Instruction:  Based on your assessment of the patient and the clinical information, please provide the requested documentation by clicking on the appropriate radio button and enter any additional information if prompted.    Question: Is there a diagnosis indicative of a patient meeting SIRS criteria and with organ dysfunction in the setting of CDI TO ENTER infection    When answering this query, please exercise your independent professional judgment. The fact that a question is being asked, does not imply that any particular answer is desired or expected.    The patient's clinical indicators include:  Clinical Information: 68 yo male admitted with pneumonia and metabolic encephalopathy    Clinical Indicators:   Vital signs T35.4 HR 94 R27 /78 94% RA   WBC 13.7  Lactate 1.5  CR  1.11,   1.43   chest xray:\" Impression:  Retrocardiac airspace opacity which may represent atelectasis or  pneumonia/aspiration in the appropriate clinical setting. \"    Treatment:   NS IV bolus 1500 ml   NS IV bolus 500 ml  LR IV bolus 1000ml  Zosyn 3.375 gm IV once  Rocephin 1 gm IV  Q24hrs  - present    Risk Factors: Pneumonia , metabolic encephalopathy, YOLY, malnutrition  Options provided:  -- Sepsis with renal organ dysfunction of YOLY not poa  -- Sepsis with multi-system organ dysfunction of YOLY and metabolic encephalopathy not poa  -- Sepsis with neurolgical organ dysfunction of metabolic encephalopathy not poa  -- Other - I will add my own diagnosis  -- Refer to Clinical " Documentation Reviewer    Query created by: Hedy Johnson on 11/21/2024 5:37 PM      Electronically signed by:  BIPIN BENITEZ MD 11/22/2024 1:29 PM

## 2024-11-22 NOTE — PROGRESS NOTES
"Physical Therapy    Physical Therapy Treatment    Patient Name: Ivan Bravo  MRN: [id number]  Today's Date: 11/22/2024  Time Calculation  Start Time: 1020  Stop Time: 1046  Time Calculation (min): 26 min     SICU 14/14-A    Assessment/Plan   PT Assessment  PT Assessment Results: Decreased strength, Decreased endurance, Impaired balance, Decreased mobility, Decreased coordination, Decreased cognition, Impaired judgement, Decreased safety awareness  Rehab Prognosis: Fair  Treatment Tolerance: Patient tolerated treatment well, Patient limited by fatigue  Medical Staff Made Aware: Yes  End of Session Communication: Bedside nurse  Assessment Comment: Patient continues to require (A) for safety with bed mobility/transfers/amb. Patient will benefit from additional PT to address deficits and improve mobility.  End of Session Patient Position: Up in chair, Alarm on (Reclined in chair with LEs elevated; Call light within reach.)  PT Plan  Inpatient/Swing Bed or Outpatient: Inpatient  Treatment/Interventions: Bed mobility, Transfer training, Gait training, Stair training, Balance training, Strengthening, Endurance training, Therapeutic exercise, Therapeutic activity  PT Plan: Ongoing PT  PT Frequency: 3 times per week  PT Discharge Recommendations: Moderate intensity level of continued care    PT Recommended Transfer Status: Assist x2, ww    General Visit Information:   PT  Visit  PT Received On: 11/22/24  General  Family/Caregiver Present: No  Co-Treatment: OT  Co-Treatment Reason: Co-treat with OT to maximize patient and staff safety with transfers/amb.  Prior to Session Communication: Bedside nurse  Patient Position Received: Bed, 3 rail up  General Comment: Patient prefers to be called \"[place]\". Pleasant and cooperative with treatment.    General Observations:   General Observation: Tele; External male catheter; Multiple monitor lines to manage.    Subjective     Precautions:  Precautions  Medical Precautions: Fall " precautions    Vital Signs:  Vital Signs  Heart Rate:  (99-112bpm during session.)  SpO2:  (On RA. SPO2 99% during session. No c/o SOB with activities.)  BP:  (/90mmHg before activity and 163/101mmHg after activity. Nursing present and aware.)    Objective     Pain:  Pain Assessment  Pain Assessment:  (c/o (L)UE pain(unrated) from IV in bicep area; nursing aware. Ice pack for pain relief.)    Cognition:  Cognition  Orientation Level: Disoriented to place, Disoriented to time, Disoriented to situation  Processing Speed: Delayed    Balance:   Static Sitting Balance  Static Sitting-Comment/Number of Minutes: F  Dynamic Sitting Balance  Dynamic Sitting-Comments: F  Static Standing Balance  Static Standing-Comment/Number of Minutes: F- with ww  Dynamic Standing Balance  Dynamic Standing-Comments: F- with ww    Treatments:        Balance/Neuromuscular Re-Education  Balance/Neuromuscular Re-Education Activity Performed: Yes  Balance/Neuromuscular Re-Education Activity 1: Multi-level dynamic reaching in seated/standing positions (L/R/C/Below waist/Across midline) with x1UE support. Min(A) to maintain balance.  Bed Mobility  Bed Mobility: Yes  Bed Mobility 1  Bed Mobility 1: Supine to sitting  Level of Assistance 1: Minimum assistance, +2  Bed Mobility Comments 1: HOB  ~20°. Hand over hand (A) to reach across body to use the bed rail for support. (A) to lift UB from HOB while moving LEs over the EOB. Kyphotic posture.  Ambulation/Gait Training  Ambulation/Gait Training Performed: Yes  Ambulation/Gait Training 1  Surface 1: Level tile  Device 1: Rolling walker  Assistance 1: Minimum assistance (x2)  Comments/Distance (ft) 1: ~3ft x1 and ~8ft x1. NBOS. Slow chet. Kyphotic posture with forward flexed head position. Step through gait pattern. Decreased step height/length B. Decreased hip/knee extension B. v/c and (A) for safer maneuvering of ww with transfer training. Decreased safety awareness.  Transfers  Transfer:  Yes  Transfer 1  Technique 1: Sit to stand, Stand to sit  Transfer Device 1:  (ww)  Transfer Level of Assistance 1: Minimum assistance, +2  Trials/Comments 1: (2x).v/c for safe hand placement and technique. Slow transition of hands to/from ww. Decreased safety awareness.             Outcome Measures:     [facility] Basic Mobility  Turning from your back to your side while in a flat bed without using bedrails: A little  Moving from lying on your back to sitting on the side of a flat bed without using bedrails: A lot  Moving to and from bed to chair (including a wheelchair): A little  Standing up from a chair using your arms (e.g. wheelchair or bedside chair): A little  To walk in hospital room: A little  Climbing 3-5 steps with railing: Total  Basic Mobility - Total Score: 15                                      Education Documentation  Mobility Training, taught by Kelsi Mckeon PTA at 11/22/2024 12:04 PM.  Learner: Patient  Readiness: Acceptance  Method: Explanation, Demonstration, Teach-back  Response: Needs Reinforcement  Comment: See therapy note.           EDUCATION:  Individual(s) Educated: Patient  Education Provided: Body Mechanics, Fall Risk, POC, Posture (Balance; Safety with bed mobility/transfers/amb.)  Patient Response to Education:  (Patient did not verbalize understanding of information, but was receptive to instructions/(A) to complete tasks.)    Encounter Problems       Encounter Problems (Active)       PT Problem       Pt will demonstrate mod A  with bed mobility to edge of bed.   (Progressing)       Start:  11/21/24    Expected End:  12/05/24            Pt will demonstrate mod A  with sit to stand/chair transfers with FWW.   (Progressing)       Start:  11/21/24    Expected End:  12/05/24            Pt will ambulate 20 feet with FWW mod A .   (Progressing)       Start:  11/21/24    Expected End:  12/05/24            Pt to demo improved BLE strength by being able to complete supine/seated thera ex 2x20  BLEs with 4 or less rest breaks .   (Not Progressing)       Start:  11/21/24    Expected End:  12/05/24

## 2024-11-23 ENCOUNTER — APPOINTMENT (OUTPATIENT)
Dept: CARDIOLOGY | Facility: HOSPITAL | Age: 69
DRG: 177 | End: 2024-11-23
Payer: MEDICARE

## 2024-11-23 PROBLEM — N17.9 ACUTE KIDNEY INJURY (CMS-HCC): Status: ACTIVE | Noted: 2024-11-23

## 2024-11-23 PROBLEM — R13.10 DYSPHAGIA: Status: ACTIVE | Noted: 2024-11-23

## 2024-11-23 PROBLEM — E87.6 HYPOKALEMIA: Status: ACTIVE | Noted: 2024-11-23

## 2024-11-23 PROBLEM — E83.39 HYPOPHOSPHATEMIA: Status: ACTIVE | Noted: 2024-11-23

## 2024-11-23 PROBLEM — J18.9 COMMUNITY ACQUIRED PNEUMONIA: Status: ACTIVE | Noted: 2024-11-23

## 2024-11-23 PROBLEM — E83.42 HYPOMAGNESEMIA: Status: ACTIVE | Noted: 2024-11-23

## 2024-11-23 PROBLEM — E43 SEVERE PROTEIN-CALORIE MALNUTRITION (MULTI): Status: ACTIVE | Noted: 2024-11-23

## 2024-11-23 PROBLEM — F10.939 ALCOHOL WITHDRAWAL SYNDROME WITH COMPLICATION (MULTI): Status: ACTIVE | Noted: 2024-11-23

## 2024-11-23 LAB
ALBUMIN SERPL BCP-MCNC: 3.8 G/DL (ref 3.4–5)
ALP SERPL-CCNC: 77 U/L (ref 33–136)
ALT SERPL W P-5'-P-CCNC: 14 U/L (ref 10–52)
ANION GAP SERPL CALC-SCNC: 13 MMOL/L (ref 10–20)
AST SERPL W P-5'-P-CCNC: 33 U/L (ref 9–39)
BACTERIA BLD CULT: NORMAL
BACTERIA BLD CULT: NORMAL
BASOPHILS # BLD AUTO: 0.04 X10*3/UL (ref 0–0.1)
BASOPHILS NFR BLD AUTO: 0.4 %
BILIRUB SERPL-MCNC: 0.7 MG/DL (ref 0–1.2)
BUN SERPL-MCNC: 12 MG/DL (ref 6–23)
CALCIUM SERPL-MCNC: 9.8 MG/DL (ref 8.6–10.3)
CHLORIDE SERPL-SCNC: 99 MMOL/L (ref 98–107)
CO2 SERPL-SCNC: 28 MMOL/L (ref 21–32)
CREAT SERPL-MCNC: 0.97 MG/DL (ref 0.5–1.3)
EGFRCR SERPLBLD CKD-EPI 2021: 83 ML/MIN/1.73M*2
EOSINOPHIL # BLD AUTO: 0.2 X10*3/UL (ref 0–0.4)
EOSINOPHIL NFR BLD AUTO: 2.2 %
ERYTHROCYTE [DISTWIDTH] IN BLOOD BY AUTOMATED COUNT: 14.4 % (ref 11.5–14.5)
GLUCOSE SERPL-MCNC: 136 MG/DL (ref 74–99)
HCT VFR BLD AUTO: 50.6 % (ref 41–52)
HGB BLD-MCNC: 16.4 G/DL (ref 13.5–17.5)
HOLD SPECIMEN: NORMAL
IMM GRANULOCYTES # BLD AUTO: 0.04 X10*3/UL (ref 0–0.5)
IMM GRANULOCYTES NFR BLD AUTO: 0.4 % (ref 0–0.9)
LYMPHOCYTES # BLD AUTO: 1.37 X10*3/UL (ref 0.8–3)
LYMPHOCYTES NFR BLD AUTO: 15.2 %
MAGNESIUM SERPL-MCNC: 1.97 MG/DL (ref 1.6–2.4)
MCH RBC QN AUTO: 30.9 PG (ref 26–34)
MCHC RBC AUTO-ENTMCNC: 32.4 G/DL (ref 32–36)
MCV RBC AUTO: 96 FL (ref 80–100)
MONOCYTES # BLD AUTO: 0.84 X10*3/UL (ref 0.05–0.8)
MONOCYTES NFR BLD AUTO: 9.3 %
NEUTROPHILS # BLD AUTO: 6.53 X10*3/UL (ref 1.6–5.5)
NEUTROPHILS NFR BLD AUTO: 72.5 %
NRBC BLD-RTO: 0 /100 WBCS (ref 0–0)
PHOSPHATE SERPL-MCNC: 1.8 MG/DL (ref 2.5–4.9)
PLATELET # BLD AUTO: 286 X10*3/UL (ref 150–450)
POTASSIUM SERPL-SCNC: 3.7 MMOL/L (ref 3.5–5.3)
PROT SERPL-MCNC: 7.8 G/DL (ref 6.4–8.2)
RBC # BLD AUTO: 5.3 X10*6/UL (ref 4.5–5.9)
SODIUM SERPL-SCNC: 136 MMOL/L (ref 136–145)
WBC # BLD AUTO: 9 X10*3/UL (ref 4.4–11.3)

## 2024-11-23 PROCEDURE — 2500000004 HC RX 250 GENERAL PHARMACY W/ HCPCS (ALT 636 FOR OP/ED)

## 2024-11-23 PROCEDURE — 84100 ASSAY OF PHOSPHORUS: CPT

## 2024-11-23 PROCEDURE — 85025 COMPLETE CBC W/AUTO DIFF WBC: CPT

## 2024-11-23 PROCEDURE — 2500000004 HC RX 250 GENERAL PHARMACY W/ HCPCS (ALT 636 FOR OP/ED): Performed by: INTERNAL MEDICINE

## 2024-11-23 PROCEDURE — 80053 COMPREHEN METABOLIC PANEL: CPT

## 2024-11-23 PROCEDURE — 2500000001 HC RX 250 WO HCPCS SELF ADMINISTERED DRUGS (ALT 637 FOR MEDICARE OP)

## 2024-11-23 PROCEDURE — 2500000005 HC RX 250 GENERAL PHARMACY W/O HCPCS: Performed by: INTERNAL MEDICINE

## 2024-11-23 PROCEDURE — 1200000002 HC GENERAL ROOM WITH TELEMETRY DAILY

## 2024-11-23 PROCEDURE — 93010 ELECTROCARDIOGRAM REPORT: CPT | Performed by: INTERNAL MEDICINE

## 2024-11-23 PROCEDURE — 2500000002 HC RX 250 W HCPCS SELF ADMINISTERED DRUGS (ALT 637 FOR MEDICARE OP, ALT 636 FOR OP/ED): Performed by: INTERNAL MEDICINE

## 2024-11-23 PROCEDURE — 99233 SBSQ HOSP IP/OBS HIGH 50: CPT | Performed by: INTERNAL MEDICINE

## 2024-11-23 PROCEDURE — 36415 COLL VENOUS BLD VENIPUNCTURE: CPT

## 2024-11-23 PROCEDURE — 2500000001 HC RX 250 WO HCPCS SELF ADMINISTERED DRUGS (ALT 637 FOR MEDICARE OP): Performed by: INTERNAL MEDICINE

## 2024-11-23 PROCEDURE — 93005 ELECTROCARDIOGRAM TRACING: CPT

## 2024-11-23 PROCEDURE — 83735 ASSAY OF MAGNESIUM: CPT

## 2024-11-23 RX ORDER — POTASSIUM CHLORIDE 20 MEQ/1
40 TABLET, EXTENDED RELEASE ORAL ONCE
Status: COMPLETED | OUTPATIENT
Start: 2024-11-23 | End: 2024-11-23

## 2024-11-23 RX ORDER — PHENOBARBITAL 32.4 MG/1
32.4 TABLET ORAL EVERY 8 HOURS
Status: COMPLETED | OUTPATIENT
Start: 2024-11-24 | End: 2024-11-25

## 2024-11-23 RX ORDER — LANOLIN ALCOHOL/MO/W.PET/CERES
800 CREAM (GRAM) TOPICAL ONCE
Status: COMPLETED | OUTPATIENT
Start: 2024-11-23 | End: 2024-11-23

## 2024-11-23 RX ORDER — LORAZEPAM 2 MG/ML
1 INJECTION INTRAMUSCULAR EVERY 4 HOURS PRN
Status: DISCONTINUED | OUTPATIENT
Start: 2024-11-23 | End: 2024-11-25

## 2024-11-23 RX ADMIN — Medication 1 TABLET: at 08:07

## 2024-11-23 RX ADMIN — DABIGATRAN ETEXILATE 150 MG: 150 CAPSULE ORAL at 20:43

## 2024-11-23 RX ADMIN — CEFTRIAXONE SODIUM 1 G: 1 INJECTION, SOLUTION INTRAVENOUS at 17:39

## 2024-11-23 RX ADMIN — SODIUM PHOSPHATE, MONOBASIC, MONOHYDRATE AND SODIUM PHOSPHATE, DIBASIC, ANHYDROUS 15 MMOL: 142; 276 INJECTION, SOLUTION INTRAVENOUS at 11:45

## 2024-11-23 RX ADMIN — PHENOBARBITAL 32.4 MG: 32.4 TABLET ORAL at 08:07

## 2024-11-23 RX ADMIN — ENOXAPARIN SODIUM 50 MG: 60 INJECTION SUBCUTANEOUS at 08:07

## 2024-11-23 RX ADMIN — Medication 100 MG: at 08:07

## 2024-11-23 RX ADMIN — MAGNESIUM OXIDE 400 MG (241.3 MG MAGNESIUM) TABLET 800 MG: TABLET at 11:45

## 2024-11-23 RX ADMIN — METOPROLOL TARTRATE 50 MG: 50 TABLET, FILM COATED ORAL at 08:07

## 2024-11-23 RX ADMIN — METOPROLOL TARTRATE 75 MG: 50 TABLET, FILM COATED ORAL at 20:43

## 2024-11-23 RX ADMIN — POTASSIUM CHLORIDE 40 MEQ: 1500 TABLET, EXTENDED RELEASE ORAL at 11:45

## 2024-11-23 RX ADMIN — PHENOBARBITAL 32.4 MG: 32.4 TABLET ORAL at 01:09

## 2024-11-23 RX ADMIN — LORAZEPAM 1 MG: 2 INJECTION INTRAMUSCULAR; INTRAVENOUS at 14:59

## 2024-11-23 ASSESSMENT — LIFESTYLE VARIABLES
ORIENTATION AND CLOUDING OF SENSORIUM: CANNOT DO SERIAL ADDITIONS OR IS UNCERTAIN ABOUT DATE
AGITATION: NORMAL ACTIVITY
NAUSEA AND VOMITING: NO NAUSEA AND NO VOMITING
AUDITORY DISTURBANCES: NOT PRESENT
NAUSEA AND VOMITING: NO NAUSEA AND NO VOMITING
PAROXYSMAL SWEATS: NO SWEAT VISIBLE
VISUAL DISTURBANCES: NOT PRESENT
VISUAL DISTURBANCES: NOT PRESENT
ORIENTATION AND CLOUDING OF SENSORIUM: CANNOT DO SERIAL ADDITIONS OR IS UNCERTAIN ABOUT DATE
PAROXYSMAL SWEATS: NO SWEAT VISIBLE
HEADACHE, FULLNESS IN HEAD: NOT PRESENT
NAUSEA AND VOMITING: NO NAUSEA AND NO VOMITING
HEADACHE, FULLNESS IN HEAD: NOT PRESENT
TOTAL SCORE: 1
ANXIETY: NO ANXIETY, AT EASE
AGITATION: NORMAL ACTIVITY
TOTAL SCORE: 1
HEADACHE, FULLNESS IN HEAD: NOT PRESENT
TOTAL SCORE: 1
VISUAL DISTURBANCES: NOT PRESENT
ANXIETY: NO ANXIETY, AT EASE
PAROXYSMAL SWEATS: NO SWEAT VISIBLE
HEADACHE, FULLNESS IN HEAD: NOT PRESENT
AGITATION: 6
AUDITORY DISTURBANCES: NOT PRESENT
TOTAL SCORE: 1
HEADACHE, FULLNESS IN HEAD: NOT PRESENT
ANXIETY: NO ANXIETY, AT EASE
AGITATION: NORMAL ACTIVITY
ANXIETY: NO ANXIETY, AT EASE
HEADACHE, FULLNESS IN HEAD: NOT PRESENT
AGITATION: NORMAL ACTIVITY
ANXIETY: NO ANXIETY, AT EASE
ORIENTATION AND CLOUDING OF SENSORIUM: CANNOT DO SERIAL ADDITIONS OR IS UNCERTAIN ABOUT DATE
ANXIETY: NO ANXIETY, AT EASE
TREMOR: NO TREMOR
ORIENTATION AND CLOUDING OF SENSORIUM: CANNOT DO SERIAL ADDITIONS OR IS UNCERTAIN ABOUT DATE
NAUSEA AND VOMITING: NO NAUSEA AND NO VOMITING
TOTAL SCORE: 1
PAROXYSMAL SWEATS: NO SWEAT VISIBLE
NAUSEA AND VOMITING: NO NAUSEA AND NO VOMITING
VISUAL DISTURBANCES: NOT PRESENT
NAUSEA AND VOMITING: NO NAUSEA AND NO VOMITING
TREMOR: NO TREMOR
AUDITORY DISTURBANCES: NOT PRESENT
TREMOR: NO TREMOR
TOTAL SCORE: 1
NAUSEA AND VOMITING: NO NAUSEA AND NO VOMITING
AGITATION: NORMAL ACTIVITY
AGITATION: NORMAL ACTIVITY
AUDITORY DISTURBANCES: NOT PRESENT
TREMOR: NO TREMOR
BLOOD PRESSURE: 124/65
PULSE: 93
ANXIETY: NO ANXIETY, AT EASE
TREMOR: NO TREMOR
PULSE: 80
VISUAL DISTURBANCES: NOT PRESENT
AGITATION: NORMAL ACTIVITY
HEADACHE, FULLNESS IN HEAD: NOT PRESENT
NAUSEA AND VOMITING: NO NAUSEA AND NO VOMITING
PAROXYSMAL SWEATS: NO SWEAT VISIBLE
PAROXYSMAL SWEATS: NO SWEAT VISIBLE
HEADACHE, FULLNESS IN HEAD: NOT PRESENT
ANXIETY: NO ANXIETY, AT EASE
VISUAL DISTURBANCES: NOT PRESENT
TOTAL SCORE: 1
AUDITORY DISTURBANCES: NOT PRESENT
AUDITORY DISTURBANCES: NOT PRESENT
PAROXYSMAL SWEATS: NO SWEAT VISIBLE
ORIENTATION AND CLOUDING OF SENSORIUM: CANNOT DO SERIAL ADDITIONS OR IS UNCERTAIN ABOUT DATE
TREMOR: NO TREMOR
ORIENTATION AND CLOUDING OF SENSORIUM: CANNOT DO SERIAL ADDITIONS OR IS UNCERTAIN ABOUT DATE
PAROXYSMAL SWEATS: NO SWEAT VISIBLE
AUDITORY DISTURBANCES: NOT PRESENT
BLOOD PRESSURE: 126/72
TOTAL SCORE: 10
AGITATION: NORMAL ACTIVITY
TREMOR: NO TREMOR
AUDITORY DISTURBANCES: NOT PRESENT
ANXIETY: 3
PAROXYSMAL SWEATS: NO SWEAT VISIBLE
VISUAL DISTURBANCES: NOT PRESENT
ORIENTATION AND CLOUDING OF SENSORIUM: CANNOT DO SERIAL ADDITIONS OR IS UNCERTAIN ABOUT DATE
ORIENTATION AND CLOUDING OF SENSORIUM: CANNOT DO SERIAL ADDITIONS OR IS UNCERTAIN ABOUT DATE
AUDITORY DISTURBANCES: NOT PRESENT
VISUAL DISTURBANCES: NOT PRESENT
TREMOR: NO TREMOR
ORIENTATION AND CLOUDING OF SENSORIUM: CANNOT DO SERIAL ADDITIONS OR IS UNCERTAIN ABOUT DATE
HEADACHE, FULLNESS IN HEAD: NOT PRESENT
VISUAL DISTURBANCES: NOT PRESENT
TREMOR: NO TREMOR
NAUSEA AND VOMITING: NO NAUSEA AND NO VOMITING
TOTAL SCORE: 1

## 2024-11-23 ASSESSMENT — COGNITIVE AND FUNCTIONAL STATUS - GENERAL
STANDING UP FROM CHAIR USING ARMS: A LOT
DRESSING REGULAR UPPER BODY CLOTHING: A LOT
DAILY ACTIVITIY SCORE: 12
TOILETING: A LOT
HELP NEEDED FOR BATHING: A LOT
DRESSING REGULAR LOWER BODY CLOTHING: A LOT
MOBILITY SCORE: 10
PERSONAL GROOMING: A LOT
TURNING FROM BACK TO SIDE WHILE IN FLAT BAD: A LOT
MOVING TO AND FROM BED TO CHAIR: A LOT
WALKING IN HOSPITAL ROOM: TOTAL
EATING MEALS: A LOT
CLIMB 3 TO 5 STEPS WITH RAILING: TOTAL
MOVING FROM LYING ON BACK TO SITTING ON SIDE OF FLAT BED WITH BEDRAILS: A LOT

## 2024-11-23 ASSESSMENT — PAIN SCALES - GENERAL: PAINLEVEL_OUTOF10: 0 - NO PAIN

## 2024-11-23 NOTE — PROGRESS NOTES
Ivan Bravo is a 69 y.o. male on day 4 of admission presenting with Atrial fibrillation with RVR (Multi).      Subjective   Patient seen in follow up. He notes overall doing better today. He denies side effects from medications. No new complaints.        Objective     Last Recorded Vitals  BP (!) 130/96   Pulse 95   Temp 36.2 °C (97.2 °F)   Resp 17   Wt 54 kg (119 lb 0.8 oz)   SpO2 98%   Intake/Output last 3 Shifts:    Intake/Output Summary (Last 24 hours) at 11/23/2024 1711  Last data filed at 11/23/2024 1646  Gross per 24 hour   Intake 490 ml   Output --   Net 490 ml       Admission Weight  Weight: 54.4 kg (120 lb) (11/19/24 0254)    Daily Weight  11/23/24 : 54 kg (119 lb 0.8 oz)    Image Results  ECG 12 Lead  Atrial fibrillation with premature ventricular or aberrantly conducted complexes  Septal infarct (cited on or before 09-MAY-2020)  Abnormal ECG  When compared with ECG of 22-NOV-2024 06:44, (unconfirmed)  No significant change was found      Physical Exam  Vitals and nursing note reviewed.   Constitutional:       General: He is not in acute distress.     Appearance: He is cachectic. He is ill-appearing.      Comments: Global muscle wasting   HENT:      Head: Normocephalic and atraumatic.      Right Ear: External ear normal.      Left Ear: External ear normal.      Nose: Nose normal. No rhinorrhea.      Mouth/Throat:      Mouth: Mucous membranes are moist.      Pharynx: Oropharynx is clear. No oropharyngeal exudate.   Eyes:      General: No scleral icterus.        Right eye: No discharge.         Left eye: No discharge.      Conjunctiva/sclera: Conjunctivae normal.   Cardiovascular:      Rate and Rhythm: Normal rate. Rhythm irregular.      Pulses: Normal pulses.      Heart sounds: Normal heart sounds. No murmur heard.  Pulmonary:      Effort: Pulmonary effort is normal. No respiratory distress.      Breath sounds: Normal breath sounds. No wheezing or rales.   Abdominal:      General: Abdomen is  flat. There is no distension.      Palpations: Abdomen is soft.      Tenderness: There is no abdominal tenderness.   Musculoskeletal:         General: No tenderness.      Right lower leg: No edema.      Left lower leg: No edema.   Skin:     General: Skin is warm and dry.      Capillary Refill: Capillary refill takes less than 2 seconds.      Findings: No rash.   Neurological:      General: No focal deficit present.      Mental Status: He is alert. He is disoriented.   Psychiatric:         Attention and Perception: Attention normal.         Speech: Speech normal.         Behavior: Behavior is cooperative.         Judgment: Judgment is impulsive.         Relevant Results      Scheduled medications  cefTRIAXone, 1 g, intravenous, q24h  dabigatran etexilate, 150 mg, oral, BID  metoprolol tartrate, 50 mg, oral, BID  multivitamin with minerals, 1 tablet, oral, Daily  PHENobarbitaL, 32.4 mg, oral, q8h  thiamine, 100 mg, oral, Daily      Continuous medications     PRN medications  PRN medications: acetaminophen **OR** acetaminophen **OR** acetaminophen, LORazepam    Results for orders placed or performed during the hospital encounter of 11/19/24 (from the past 24 hours)   POCT GLUCOSE   Result Value Ref Range    POCT Glucose 168 (H) 74 - 99 mg/dL   CBC and Auto Differential   Result Value Ref Range    WBC 9.0 4.4 - 11.3 x10*3/uL    nRBC 0.0 0.0 - 0.0 /100 WBCs    RBC 5.30 4.50 - 5.90 x10*6/uL    Hemoglobin 16.4 13.5 - 17.5 g/dL    Hematocrit 50.6 41.0 - 52.0 %    MCV 96 80 - 100 fL    MCH 30.9 26.0 - 34.0 pg    MCHC 32.4 32.0 - 36.0 g/dL    RDW 14.4 11.5 - 14.5 %    Platelets 286 150 - 450 x10*3/uL    Neutrophils % 72.5 40.0 - 80.0 %    Immature Granulocytes %, Automated 0.4 0.0 - 0.9 %    Lymphocytes % 15.2 13.0 - 44.0 %    Monocytes % 9.3 2.0 - 10.0 %    Eosinophils % 2.2 0.0 - 6.0 %    Basophils % 0.4 0.0 - 2.0 %    Neutrophils Absolute 6.53 (H) 1.60 - 5.50 x10*3/uL    Immature Granulocytes Absolute, Automated 0.04 0.00  - 0.50 x10*3/uL    Lymphocytes Absolute 1.37 0.80 - 3.00 x10*3/uL    Monocytes Absolute 0.84 (H) 0.05 - 0.80 x10*3/uL    Eosinophils Absolute 0.20 0.00 - 0.40 x10*3/uL    Basophils Absolute 0.04 0.00 - 0.10 x10*3/uL   Comprehensive Metabolic Panel   Result Value Ref Range    Glucose 136 (H) 74 - 99 mg/dL    Sodium 136 136 - 145 mmol/L    Potassium 3.7 3.5 - 5.3 mmol/L    Chloride 99 98 - 107 mmol/L    Bicarbonate 28 21 - 32 mmol/L    Anion Gap 13 10 - 20 mmol/L    Urea Nitrogen 12 6 - 23 mg/dL    Creatinine 0.97 0.50 - 1.30 mg/dL    eGFR 83 >60 mL/min/1.73m*2    Calcium 9.8 8.6 - 10.3 mg/dL    Albumin 3.8 3.4 - 5.0 g/dL    Alkaline Phosphatase 77 33 - 136 U/L    Total Protein 7.8 6.4 - 8.2 g/dL    AST 33 9 - 39 U/L    Bilirubin, Total 0.7 0.0 - 1.2 mg/dL    ALT 14 10 - 52 U/L   Magnesium   Result Value Ref Range    Magnesium 1.97 1.60 - 2.40 mg/dL   Phosphorus   Result Value Ref Range    Phosphorus 1.8 (L) 2.5 - 4.9 mg/dL   SST TOP   Result Value Ref Range    Extra Tube Hold for add-ons.    ECG 12 Lead   Result Value Ref Range    Ventricular Rate 98 BPM    Atrial Rate 46 BPM    QRS Duration 76 ms    QT Interval 368 ms    QTC Calculation(Bazett) 469 ms    R Axis 74 degrees    T Axis 60 degrees    QRS Count 16 beats    Q Onset 224 ms    T Offset 408 ms    QTC Fredericia 433 ms       ECG 12 Lead    Result Date: 11/23/2024  Atrial fibrillation with premature ventricular or aberrantly conducted complexes Septal infarct (cited on or before 09-MAY-2020) Abnormal ECG When compared with ECG of 22-NOV-2024 06:44, (unconfirmed) No significant change was found    CT head wo IV contrast    Result Date: 11/22/2024  Interpreted By:  Diana Paiz, STUDY: CT HEAD WO IV CONTRAST  11/22/2024 8:06 pm   INDICATION: Signs/Symptoms:Fall, hit head   COMPARISON: CT head 11/19/2024.   ACCESSION NUMBER(S): [id number]   ORDERING CLINICIAN: JORDY SANDOVAL   TECHNIQUE: Serial, axial CT images of the brain were obtained without IV  contrast. Coronal and sagittal reformatted images were performed.   FINDINGS: The ventricles, cisterns and sulci are prominent, consistent with diffuse volume loss.  There are areas of nonspecific white matter hypodensity, which are probably age-related or microvascular in nature. The gray-white matter differentiation is intact and there is no evidence of acute territorial infarct.  No mass effect or midline shift is seen.  There is no hemorrhage.  No extraaxial fluid collection. No air-fluid levels at the visualized paranasal sinuses. The mastoid air cells are clear. No depressed calvarial fracture.       1.  No acute intracranial hemorrhage or depressed calvarial fracture. 2.  Diffuse parenchymal volume loss. Chronic microvascular ischemic changes.     MACRO: None.   Signed by: Diana Paiz 11/22/2024 8:35 PM Dictation workstation:   [id number]    FL modified barium swallow study    Result Date: 11/22/2024  Interpreted By:  Boris Joseph and Stives Michael STUDY: FL MODIFIED BARIUM SWALLOW STUDY;; 11/22/2024 11:54 am   INDICATION: Signs/Symptoms:Dysphagia, requiring puree/nectar thick.     COMPARISON: None.   ACCESSION NUMBER(S): [id number]   ORDERING CLINICIAN: RADAMES DEL REAL   TECHNIQUE: Modified Barium Swallow Study completed. Informed verbal consent obtained prior to completion of exam. Trials of thin, nectar/mildly thick liquid,  puree, and regular solids were given.   Modified Barium Swallow Study completed. Informed verbal consent obtained prior to completion of exam. The study was completed per protocol with various liquid barium consistencies, pudding, solids and a 13mm barium tablet. A 1.9 cm or .75 inch (outer diameter) ring was placed on the chin in the lateral view and on the lateral, left side of the neck in the a-p view in order to complete objective measurements during swallowing. The anatomic structures and function of the oropharynx, larynx, hypopharynx and cervical esophagus were  evaluated.     SLP: Parker Pugh, SLP Contact info: Haiku secure chat; phone: [phone]   SPEECH FINDINGS: Reason for Referral: A modified barium swallow study was recommended at bedside swallow evaluation was completed yesterday due to signs symptoms aspiration with thin liquids and no signs symptoms aspiration with nectar thick liquids or solid foods.  MBS recommended to further assess physiology of the swallow and determine if patient can tolerate highest/safest p.o. diet. Patient Hx: Patient is a 69 y.o. year old male patient with Past Medical History of  pAfib on Pradaxa, ETOH abuse, malnutrition who was brought to [facility] ER on  for evaluation of mental status change worsening over the past two days, LKW  evening. In the ER he was also noted to be in Afib with RVR, initiated on Cardizem infusion and subsequently admitted to SDU with CIWA monitoring. Respiratory Status: Nasal cannula Current diet: Purï¿½ed diet with nectar thick liquids and no straws   Pain: Pain Scale: 0-10 Ratin   FINAL SPEECH RECOMMENDATIONS   DIET: - Soft & Bite Sized (IDDSI Level 6) - Thin liquids (IDDSI Level 0)   STRATEGIES: - Small bites - Small, single sips - Alternate consistencies - Effortful swallow - Upright for all PO intake - Swallow 2-3 times per bite/sip - No straws   Plan: Treatment/Interventions: Pharyngeal exercises, Patient/family education, Bolus trials, Compensatory strategy training, Diet tolerance/advancement SLP Plan: Skilled SLP warranted SLP Frequency: 3x per week Duration: 30 days   Discussed POC: Patient Discussed Risks/Benefits: Yes Patient/Caregiver Agreeable: Yes   Short term goals established 24:   1.  Patient will be able to tolerate soft bite-size diet with thin liquids without overt signs symptoms aspiration or pulmonary compromise. Start Date: 24 Progress: Patient tolerated soft bite-size and thin liquids during modified barium swallow study without aspiration.  He had deep  penetration with straw sips of thin liquids that cleared without evidence of aspiration. Status: Continue goal   2.  Patient will be able to use compensatory swallowing strategies including sitting upright while eating, slow rate of intake, alternating bites/sips and no straws. Start Date: 11/22/24 Progress: Patient educated regarding use of compensatory swallowing strategies and not using straws with thin liquids. Status: Continue goal   3.  Patient will be able to complete base of tongue techniques 2-3 times per day independently including resistive tongue techniques, Joie technique, effortful swallow technique, chin tuck to resistance and Shaker technique. Start Date: Initiate at next treatment session Progress: N/A Status: Continue goal   Long term goals 11/22/24: Patient will tolerate the least restrictive diet without overt difficulty or further pulmonary compromise by time of discharge.   Education Provided: Results and recommendations per MBSS, with video review; recommendations and POC at this time. Verbal understanding and agreement given on all accounts.     Mechanics of the Swallow Summary: ORAL PHASE: Lip Closure - Interlabial escape/no progression to anterior lip Tongue Control During Bolus Hold - Escape to lateral buccal cavity and/or floor of mouth Bolus prep/mastication - Disorganized mastication with solid pieces of bolus unchewed Bolus transport/lingual motion - Slowed Tongue Motion for A-P movement of the bolus Oral residue - Residue collection on oral structure   PHARYNGEAL PHASE: Initiation of pharyngeal swallow - Bolus head at pit of pyriforms Soft palate elevation - No bolus between soft palate/pharyngeal wall Laryngeal elevation - Complete superior movement of thyroid cartilage with contact of arytenoids to epiglottic petiole Anterior hyoid excursion - Complete anterior movement Epiglottic movement - Partial inversion Laryngeal vestibule closure - Incomplete - narrow column of air/contrast  in laryngeal vestibule Pharyngeal stripping wave - Present, however, diminished Pharyngeal contraction (A/P view) - Complete Pharyngoesophageal segment opening - Partial distension/partial duration with partial obstruction of flow of bolus Tongue base retraction - Narrow column of contrast or air between tongue base and pharyngeal wall Pharyngeal residue - Collection of residue within or on the pharyngeal structures   ESOPHAGEAL PHASE: Esophageal clearance - 20 mL thin liquid trial Complete clearance Puree consistency trial Not evaluated Barium tablet trial Not evaluated     SLP Impressions with Severity Rating: Pt presents with mild/moderate oropharyngeal dysphagia upon completion of modified barium swallow study this date. Swallowing physiology is detailed above. Impairments most impacting swallowing safety and efficiency include bolus prep/mastication, bolus transport/lingual motion and initiation of the pharyngeal swallow. Patient also had reduced epiglottic inversion and reduced laryngeal vestibule closure with thin liquids. Patient demonstrated penetration with suspected aspiration post swallow with thin liquids via straw due to deep penetration.  He also had penetration that cleared without evidence of aspiration with thin liquids via cup for both single and multiple sips.  No evidence of aspiration seen with cup sips of thin liquids.  He was also able to tolerate nectar thick liquids and had penetration with nectar thick liquids that cleared without evidence of aspiration.  No further penetration was observed for any other consistency. Patient demonstrated mild-moderate oral and pharyngeal residue that was reduced with second and third swallow.  Recommending a soft bite-size diet with thin liquids and no straws.     RADIOLOGY FINDINGS: Prompt initiation of the swallowing mechanism was observed. Deep laryngeal penetration was seen with thin barium liquid via straw with probable aspiration. Laryngeal  penetration also observed with nectar thick barium liquid.   Radiology section of this report signed by Dr. Joseph.       Deep laryngeal penetration and probable aspiration of thin barium liquid via straw as well as laryngeal penetration with nectar thick liquid.   Please see speech pathology section of the report for additional details and recommendations.   MACRO: None   Signed by: Boris Joseph 11/22/2024 1:37 PM Dictation workstation:   [id number]    ECG 12 Lead    Result Date: 11/22/2024  Atrial fibrillation with rapid ventricular response Septal infarct (cited on or before 09-MAY-2020) Abnormal ECG When compared with ECG of 21-NOV-2024 07:17, (unconfirmed) Questionable change in initial forces of Anterior leads    ECG 12 Lead    Result Date: 11/21/2024  Atrial fibrillation with rapid ventricular response Anteroseptal infarct (cited on or before 09-MAY-2020) Abnormal ECG When compared with ECG of 21-NOV-2024 07:17, (unconfirmed) No significant change was found    Transthoracic Echo (TTE) Complete    Result Date: 11/20/2024          [place] [id number]  Tel [phone] Fax [phone] TRANSTHORACIC ECHOCARDIOGRAM REPORT Patient Name:       SAWYER TORRES DELIA  Reading Physician:    [id number] Isac Pereira MD, [facility] Study Date:         11/20/2024          Ordering Provider:    [id number] RADAMES DEL REAL MRN/PID:            [id number]            Fellow: Accession#:         [id number]        Nurse: Date of Birth/Age:  1955 / 69      Sonographer:          Stacie jiménez                                     [facility] Gender Assigned at  M                   Additional Staff: Birth: Height:             172.72 cm           Admit Date: Weight:             58.06 kg            Admission Status:     Inpatient -                                                                Routine BSA / BMI:          1.69 m2 / 19.46     Department Location:  [facility]                     kg/m2                                     Echo Lab Blood Pressure: 152 /91 mmHg Study Type:    TRANSTHORACIC ECHO (TTE) COMPLETE Diagnosis/ICD: Unspecified atrial fibrillation-I48.91; Non ST elevation (NSTEMI)                myocardial infarction-I21.4 Indication:    Atrial fibrillation with RVR; NSTEMI CPT Codes:     Echo Complete w Full [id number] Patient History: Smoker:            Current. Pertinent History: A-Fib and ETOH abuse. Study Detail: The following Echo studies were performed: 2D, M-Mode, color flow               and Doppler. Technically challenging study due to patient lying in               supine position and body habitus. Definity used as a contrast               agent for endocardial border definition. Total contrast used for               this procedure was 2.0 mL via IV push.  PHYSICIAN INTERPRETATION: Left Ventricle: Left ventricular ejection fraction is low normal, by visual estimate at 50-55%. Wall motion is abnormal. The left ventricular cavity size is normal. There is mild increased septal and normal posterior left ventricular wall thickness. Left ventricular diastolic filling was indeterminate. Underlying atrial fibrillation. Moderate hypokinesis of the basal inferior wall. Left Atrium: The left atrium was not assessed. Right Ventricle: The right ventricle is normal in size. There is normal right ventricular global systolic function. Normal right ventricular chamber size and function. Right Atrium: The right atrium is normal in size. Aortic Valve: The aortic valve is trileaflet. There is mild to moderate aortic valve cusp calcification. There is no evidence of aortic valve regurgitation. The peak instantaneous gradient of the aortic valve is 3 mmHg. Mitral Valve: The mitral valve is mildly thickened. There is trace to mild mitral valve regurgitation.  Trivial to 1+ mitral regurgitation. Tricuspid Valve: The tricuspid valve is structurally normal. There is trace tricuspid regurgitation. The right ventricular systolic pressure is unable to be estimated. Trivial tricuspid regurgitation. Pulmonic Valve: The pulmonic valve is structurally normal. There is no indication of pulmonic valve regurgitation. Pericardium: No pericardial effusion noted. Aorta: The aortic root is normal. Systemic Veins: The inferior vena cava appears normal in size, with IVC inspiratory collapse greater than 50%. In comparison to the previous echocardiogram(s): Comparison study dated 20 showed an LVEF 60%. Trivial MR and TR.  CONCLUSIONS:  1. Left ventricular ejection fraction is low normal, by visual estimate at 50-55%.  2. Abnormal wall motion.  3. Left ventricular diastolic filling was indeterminate.  4. Underlying atrial fibrillation.     Moderate hypokinesis of the basal inferior wall.  5. There is normal right ventricular global systolic function.  6. Trivial tricuspid regurgitation.  7. Comparison study dated 20 showed an LVEF 60%. Trivial MR and TR. QUANTITATIVE DATA SUMMARY:  2D MEASUREMENTS:           Normal Ranges: LAs:             3.42 cm   (2.7-4.0cm) IVSd:            1.07 cm   (0.6-1.1cm) LVPWd:           0.94 cm   (0.6-1.1cm) LVPWs:           0.58 cm LVIDd:           4.59 cm   (3.9-5.9cm) LVIDs:           3.74 cm LV Mass Index:   94.6 g/m2 LV % FS          18.4 %  LV SYSTOLIC FUNCTION BY 2D PLANIMETRY (MOD):                      Normal Ranges: EF-A4C View:    45 % (>=55%) EF-A2C View:    42 % EF-Biplane:     45 % EF-Visual:      53 % LV EF Reported: 53 %  AORTIC VALVE:          Normal Ranges: AoV Vmax:     0.82 m/s (<=1.7m/s) AoV Peak P.7 mmHg (<20mmHg) LVOT Max Gino: 0.53 m/s (<=1.1m/s) LVOT VTI:     8.89 cm  TRICUSPID VALVE/RVSP:         Normal Ranges: IVC Diam:             1.48 cm  PULMONIC VALVE:          Normal Ranges: PV Accel Time:  127 msec (>120ms) PV Max  Gino:     0.6 m/s  (0.6-0.9m/s) PV Max P.4 mmHg  [id number] Isac Pereira MD, [facility] Electronically signed on 2024 at 5:13:24 PM  ** Final **     ECG 12 Lead    Result Date: 2024  Atrial fibrillation with premature ventricular or aberrantly conducted complexes Septal infarct (cited on or before 09-MAY-2020) Abnormal ECG When compared with ECG of 2024 06:55, T wave inversion no longer evident in Inferior leads T wave inversion no longer evident in Lateral leads Confirmed by Isac Pereira ([id number]) on 2024 12:05:30 PM    ECG 12 lead    Result Date: 2024  Atrial fibrillation Rightward axis Anteroseptal infarct (cited on or before 09-MAY-2020) Marked ST abnormality, possible inferior subendocardial injury Abnormal ECG When compared with ECG of 2024 06:02, (unconfirmed) No significant change was found See ED provider note for full interpretation and clinical correlation Confirmed by Leslee Del Angel ([id number]) on 2024 11:04:23 AM    ECG 12 lead    Result Date: 2024  Atrial fibrillation with rapid ventricular response with premature ventricular or aberrantly conducted complexes Anteroseptal infarct (cited on or before 09-MAY-2020) Marked ST abnormality, possible inferior subendocardial injury Abnormal ECG When compared with ECG of 09-MAY-2020 09:47, ST more depressed in Inferior leads Inverted T waves have replaced nonspecific T wave abnormality in Inferior leads T wave inversion now evident in Lateral leads See ED provider note for full interpretation and clinical correlation Confirmed by Leslee Del Angel ([id number]) on 2024 11:03:59 AM    ECG 12 lead    Result Date: 2024  Atrial fibrillation with rapid ventricular response Rightward axis Anteroseptal infarct (cited on or before 09-MAY-2020) Marked ST abnormality, possible inferior subendocardial injury Abnormal ECG When compared with ECG of 2024 05:59, (unconfirmed) No significant change was found  See ED provider note for full interpretation and clinical correlation Confirmed by Leslee Del Angel ([id number]) on 11/19/2024 11:03:40 AM    XR chest 1 view    Result Date: 11/19/2024  Interpreted By:  Finkelstein, Evan, STUDY: XR CHEST 1 VIEW;  11/19/2024 5:24 am   INDICATION: Signs/Symptoms:aspiration.     COMPARISON: Chest radiograph 05/09/2020   ACCESSION NUMBER(S): [id number]   ORDERING CLINICIAN: PILAR EASLEY   FINDINGS: Median sternotomy wires are present.   CARDIOMEDIASTINAL SILHOUETTE: Cardiomediastinal silhouette is stable in size and configuration.   LUNGS: Retrocardiac airspace opacity. Skin folds overlie the right hemithorax. No sizable pleural effusion or pneumothorax.   ABDOMEN: No remarkable upper abdominal findings.   BONES: No acute osseous abnormality.       Retrocardiac airspace opacity which may represent atelectasis or pneumonia/aspiration in the appropriate clinical setting.   MACRO: None.   Signed by: Evan Finkelstein 11/19/2024 6:21 AM Dictation workstation:   [id number]    CT head wo IV contrast    Result Date: 11/19/2024  Interpreted By:  Finkelstein, Evan, STUDY: CT HEAD WO IV CONTRAST;  11/19/2024 5:23 am   INDICATION: Signs/Symptoms:ams.     COMPARISON: CT brain 05/12/2020   ACCESSION NUMBER(S): [id number]   ORDERING CLINICIAN: PILAR EASLEY   TECHNIQUE: Axial noncontrast CT images of the head with coronal and sagittal reconstructions.   FINDINGS: EXTRACRANIAL SOFT TISSUES: Unremarkable.   CALVARIUM: No depressed skull fracture. No destructive osseous lesion.   PARANASAL SINUSES/MASTOIDS: The visualized paranasal sinuses and mastoid air cells are aerated.   HEMORRHAGE: No acute intracranial hemorrhage.   BRAIN PARENCHYMA: Gray-white matter interfaces are preserved. No mass effect or midline shift. There are nonspecific scattered white matter hypodensities.   VENTRICLES and EXTRA-AXIAL SPACES: Parenchymal atrophy with prominence of the ventricles and cortical sulci.   OTHER  FINDINGS: There are calcifications within the cavernous carotids       No acute intracranial hemorrhage, mass effect or midline shift.   Nonspecific scattered white matter hypodensities favored to represent sequela of small vessel ischemia.     MACRO: None.   Signed by: Evan Finkelstein 11/19/2024 5:31 AM Dictation workstation:   [id number]              Assessment/Plan        Atrial Fibrillation with RVR  -Currently rate controlled  -Cardiology following  -Continue to monitor on telemetry  -Echocardiogram on 11/20 showed low normal LVEF 50-55%, abnormal wall motion, moderate basal inferior wall hypokinesis, normal right ventricular global systolic function, trivial TR  -Resumed home Pradaxa on 11/23 as patient able to take PO  -Increased metoprolol tartrate from 50 to 75 mg BID on 11/23 given hypertension and HR in the 90s (first dose given in evening). Overnight HR was 77-95. Monitor on this adjusted dose additional 24H.   -Goal K > 4 and Mg > 2. For readings today, will plan to give Klor-con 10 mEq x 1 and magnesium sulfate 2 grams IVPB x 1  -Plan for outpatient follow up with Dr. Delgado with consideration of cardioversion at later date if persistent.    Alcohol Withdrawal with Complication  -Continue CIWA scoring. Patient had normal levels until 1500 yesterday when he had a CIWA score of 10. He was given lorazepam 1 mg IV x 1 and responded well to this. Lorazepam made available Q4H PRN with continuous pulse ox ordered due to combination of sedating meds.   -Continue oral phenobarbital taper additional 24H for three days total at lowest dose given agitation on 11/23, holding for sedation.   -Continue thiamine, folic acid, MVI daily  -Suggest OP counseling/treatment once withdrawal taper complete    Dysphagia  -Appreciate speech therapy input  -MBS completed  -Rec soft, bite size meals with thin liquids    Acute Kidney Injury  -Resolved with resuscitation  -Continue to monitor renal function with PO  intake    Hypokalemia  Hypomagnesemia  Hypophosphatemia  -Levels overall improving  -Optimize potassium and magnesium levels as noted above  -Recheck levels tomorrow AM    Community Acquired Pneumonia  -Suggested by CXR on 11/19  -Received azithromycin and completed five day course of ceftriaxone on 11/23  -Afebrile on room air with normal WBC count  -Consider imaging for resolution in late December    Severe Protein-Calorie Malnutrition  -Appreciate dietitian input  -Continue supplementation      DVT Prophylaxis: on Pradaxa  Dispo: PT/OT rec mod intensity LOC. Requests  [facility], able to accept once medically stable and off CIWA, possibly 11/25           Malnutrition Diagnosis Status: New  Malnutrition Diagnosis: Severe malnutrition related to starvation  As Evidenced by: severe muscle wasting, severe fat loss, progressive wt loss over the past 8 months, pt likely meeting < 75% of estimated energy requiriements prior to admission  I agree with the dietitian's malnutrition diagnosis.         Jose Aguilera MD

## 2024-11-23 NOTE — PROGRESS NOTES
"Ivan Bravo is a 69 y.o. male on day 4 of admission presenting with Atrial fibrillation with RVR (Multi).      Subjective   Patient seen in follow up. He denies specific complaints. He has a sitter at bedside and appears somewhat confused. Reported wanting some \"shots.\"       Objective     Last Recorded Vitals  BP (!) 158/104   Pulse 96   Temp 36.3 °C (97.3 °F)   Resp 18   Wt 54 kg (119 lb 0.8 oz)   SpO2 95%   Intake/Output last 3 Shifts:    Intake/Output Summary (Last 24 hours) at 11/23/2024 1113  Last data filed at 11/23/2024 0400  Gross per 24 hour   Intake 350 ml   Output 425 ml   Net -75 ml       Admission Weight  Weight: 54.4 kg (120 lb) (11/19/24 0254)    Daily Weight  11/23/24 : 54 kg (119 lb 0.8 oz)    Image Results  ECG 12 Lead  Atrial fibrillation with premature ventricular or aberrantly conducted complexes  Septal infarct (cited on or before 09-MAY-2020)  Abnormal ECG  When compared with ECG of 22-NOV-2024 06:44, (unconfirmed)  No significant change was found      Physical Exam  Vitals and nursing note reviewed.   Constitutional:       General: He is not in acute distress.     Appearance: He is cachectic. He is ill-appearing.      Comments: Global muscle wasting   HENT:      Head: Normocephalic and atraumatic.      Right Ear: External ear normal.      Left Ear: External ear normal.      Nose: Nose normal. No rhinorrhea.      Mouth/Throat:      Mouth: Mucous membranes are moist.      Pharynx: Oropharynx is clear. No oropharyngeal exudate.   Eyes:      General: No scleral icterus.        Right eye: No discharge.         Left eye: No discharge.      Conjunctiva/sclera: Conjunctivae normal.   Cardiovascular:      Rate and Rhythm: Normal rate. Rhythm irregular.      Pulses: Normal pulses.      Heart sounds: Normal heart sounds. No murmur heard.  Pulmonary:      Effort: Pulmonary effort is normal. No respiratory distress.      Breath sounds: Normal breath sounds. No wheezing or rales.   Abdominal:    "   General: Abdomen is flat. There is no distension.      Palpations: Abdomen is soft.      Tenderness: There is no abdominal tenderness.   Musculoskeletal:         General: No tenderness.      Right lower leg: No edema.      Left lower leg: No edema.   Skin:     General: Skin is warm and dry.      Capillary Refill: Capillary refill takes less than 2 seconds.      Findings: No rash.   Neurological:      General: No focal deficit present.      Mental Status: He is alert. He is disoriented.   Psychiatric:         Attention and Perception: Attention normal.         Speech: Speech normal.         Behavior: Behavior is cooperative.         Judgment: Judgment is impulsive.         Relevant Results      Scheduled medications  cefTRIAXone, 1 g, intravenous, q24h  dabigatran etexilate, 150 mg, oral, BID  magnesium oxide, 800 mg, oral, Once  metoprolol tartrate, 50 mg, oral, BID  multivitamin with minerals, 1 tablet, oral, Daily  PHENobarbitaL, 32.4 mg, oral, q8h  potassium chloride CR, 40 mEq, oral, Once  sodium phosphate, 15 mmol, intravenous, Once  thiamine, 100 mg, oral, Daily      Continuous medications     PRN medications  PRN medications: acetaminophen **OR** acetaminophen **OR** acetaminophen    Results for orders placed or performed during the hospital encounter of 11/19/24 (from the past 24 hours)   POCT GLUCOSE   Result Value Ref Range    POCT Glucose 168 (H) 74 - 99 mg/dL   CBC and Auto Differential   Result Value Ref Range    WBC 9.0 4.4 - 11.3 x10*3/uL    nRBC 0.0 0.0 - 0.0 /100 WBCs    RBC 5.30 4.50 - 5.90 x10*6/uL    Hemoglobin 16.4 13.5 - 17.5 g/dL    Hematocrit 50.6 41.0 - 52.0 %    MCV 96 80 - 100 fL    MCH 30.9 26.0 - 34.0 pg    MCHC 32.4 32.0 - 36.0 g/dL    RDW 14.4 11.5 - 14.5 %    Platelets 286 150 - 450 x10*3/uL    Neutrophils % 72.5 40.0 - 80.0 %    Immature Granulocytes %, Automated 0.4 0.0 - 0.9 %    Lymphocytes % 15.2 13.0 - 44.0 %    Monocytes % 9.3 2.0 - 10.0 %    Eosinophils % 2.2 0.0 - 6.0 %     Basophils % 0.4 0.0 - 2.0 %    Neutrophils Absolute 6.53 (H) 1.60 - 5.50 x10*3/uL    Immature Granulocytes Absolute, Automated 0.04 0.00 - 0.50 x10*3/uL    Lymphocytes Absolute 1.37 0.80 - 3.00 x10*3/uL    Monocytes Absolute 0.84 (H) 0.05 - 0.80 x10*3/uL    Eosinophils Absolute 0.20 0.00 - 0.40 x10*3/uL    Basophils Absolute 0.04 0.00 - 0.10 x10*3/uL   Comprehensive Metabolic Panel   Result Value Ref Range    Glucose 136 (H) 74 - 99 mg/dL    Sodium 136 136 - 145 mmol/L    Potassium 3.7 3.5 - 5.3 mmol/L    Chloride 99 98 - 107 mmol/L    Bicarbonate 28 21 - 32 mmol/L    Anion Gap 13 10 - 20 mmol/L    Urea Nitrogen 12 6 - 23 mg/dL    Creatinine 0.97 0.50 - 1.30 mg/dL    eGFR 83 >60 mL/min/1.73m*2    Calcium 9.8 8.6 - 10.3 mg/dL    Albumin 3.8 3.4 - 5.0 g/dL    Alkaline Phosphatase 77 33 - 136 U/L    Total Protein 7.8 6.4 - 8.2 g/dL    AST 33 9 - 39 U/L    Bilirubin, Total 0.7 0.0 - 1.2 mg/dL    ALT 14 10 - 52 U/L   Magnesium   Result Value Ref Range    Magnesium 1.97 1.60 - 2.40 mg/dL   Phosphorus   Result Value Ref Range    Phosphorus 1.8 (L) 2.5 - 4.9 mg/dL   SST TOP   Result Value Ref Range    Extra Tube Hold for add-ons.    ECG 12 Lead   Result Value Ref Range    Ventricular Rate 98 BPM    Atrial Rate 46 BPM    QRS Duration 76 ms    QT Interval 368 ms    QTC Calculation(Bazett) 469 ms    R Axis 74 degrees    T Axis 60 degrees    QRS Count 16 beats    Q Onset 224 ms    T Offset 408 ms    QTC Fredericia 433 ms       ECG 12 Lead    Result Date: 11/23/2024  Atrial fibrillation with premature ventricular or aberrantly conducted complexes Septal infarct (cited on or before 09-MAY-2020) Abnormal ECG When compared with ECG of 22-NOV-2024 06:44, (unconfirmed) No significant change was found    CT head wo IV contrast    Result Date: 11/22/2024  Interpreted By:  Diana Paiz, STUDY: CT HEAD WO IV CONTRAST  11/22/2024 8:06 pm   INDICATION: Signs/Symptoms:Fall, hit head   COMPARISON: CT head 11/19/2024.   ACCESSION  NUMBER(S): [id number]   ORDERING CLINICIAN: JORDY SANDOVAL   TECHNIQUE: Serial, axial CT images of the brain were obtained without IV contrast. Coronal and sagittal reformatted images were performed.   FINDINGS: The ventricles, cisterns and sulci are prominent, consistent with diffuse volume loss.  There are areas of nonspecific white matter hypodensity, which are probably age-related or microvascular in nature. The gray-white matter differentiation is intact and there is no evidence of acute territorial infarct.  No mass effect or midline shift is seen.  There is no hemorrhage.  No extraaxial fluid collection. No air-fluid levels at the visualized paranasal sinuses. The mastoid air cells are clear. No depressed calvarial fracture.       1.  No acute intracranial hemorrhage or depressed calvarial fracture. 2.  Diffuse parenchymal volume loss. Chronic microvascular ischemic changes.     MACRO: None.   Signed by: Diana Paiz 11/22/2024 8:35 PM Dictation workstation:   [id number]    FL modified barium swallow study    Result Date: 11/22/2024  Interpreted By:  Boris Joseph and Stives Michael STUDY: FL MODIFIED BARIUM SWALLOW STUDY;; 11/22/2024 11:54 am   INDICATION: Signs/Symptoms:Dysphagia, requiring puree/nectar thick.     COMPARISON: None.   ACCESSION NUMBER(S): [id number]   ORDERING CLINICIAN: RADAMES DEL REAL   TECHNIQUE: Modified Barium Swallow Study completed. Informed verbal consent obtained prior to completion of exam. Trials of thin, nectar/mildly thick liquid,  puree, and regular solids were given.   Modified Barium Swallow Study completed. Informed verbal consent obtained prior to completion of exam. The study was completed per protocol with various liquid barium consistencies, pudding, solids and a 13mm barium tablet. A 1.9 cm or .75 inch (outer diameter) ring was placed on the chin in the lateral view and on the lateral, left side of the neck in the a-p view in order to complete objective  measurements during swallowing. The anatomic structures and function of the oropharynx, larynx, hypopharynx and cervical esophagus were evaluated.     SLP: MELINA Paiz Contact info: [employer] secure chat; phone: [phone]   SPEECH FINDINGS: Reason for Referral: A modified barium swallow study was recommended at bedside swallow evaluation was completed yesterday due to signs symptoms aspiration with thin liquids and no signs symptoms aspiration with nectar thick liquids or solid foods.  MBS recommended to further assess physiology of the swallow and determine if patient can tolerate highest/safest p.o. diet. Patient Hx: Patient is a 69 y.o. year old male patient with Past Medical History of  pAfib on Pradaxa, ETOH abuse, malnutrition who was brought to [facility] ER on  for evaluation of mental status change worsening over the past two days, LKW  evening. In the ER he was also noted to be in Afib with RVR, initiated on Cardizem infusion and subsequently admitted to SDU with CIWA monitoring. Respiratory Status: Nasal cannula Current diet: Purï¿½ed diet with nectar thick liquids and no straws   Pain: Pain Scale: 0-10 Ratin   FINAL SPEECH RECOMMENDATIONS   DIET: - Soft & Bite Sized (IDDSI Level 6) - Thin liquids (IDDSI Level 0)   STRATEGIES: - Small bites - Small, single sips - Alternate consistencies - Effortful swallow - Upright for all PO intake - Swallow 2-3 times per bite/sip - No straws   Plan: Treatment/Interventions: Pharyngeal exercises, Patient/family education, Bolus trials, Compensatory strategy training, Diet tolerance/advancement SLP Plan: Skilled SLP warranted SLP Frequency: 3x per week Duration: 30 days   Discussed POC: Patient Discussed Risks/Benefits: Yes Patient/Caregiver Agreeable: Yes   Short term goals established 24:   1.  Patient will be able to tolerate soft bite-size diet with thin liquids without overt signs symptoms aspiration or pulmonary compromise. Start Date:  11/22/24 Progress: Patient tolerated soft bite-size and thin liquids during modified barium swallow study without aspiration.  He had deep penetration with straw sips of thin liquids that cleared without evidence of aspiration. Status: Continue goal   2.  Patient will be able to use compensatory swallowing strategies including sitting upright while eating, slow rate of intake, alternating bites/sips and no straws. Start Date: 11/22/24 Progress: Patient educated regarding use of compensatory swallowing strategies and not using straws with thin liquids. Status: Continue goal   3.  Patient will be able to complete base of tongue techniques 2-3 times per day independently including resistive tongue techniques, Joie technique, effortful swallow technique, chin tuck to resistance and Shaker technique. Start Date: Initiate at next treatment session Progress: N/A Status: Continue goal   Long term goals 11/22/24: Patient will tolerate the least restrictive diet without overt difficulty or further pulmonary compromise by time of discharge.   Education Provided: Results and recommendations per MBSS, with video review; recommendations and POC at this time. Verbal understanding and agreement given on all accounts.     Mechanics of the Swallow Summary: ORAL PHASE: Lip Closure - Interlabial escape/no progression to anterior lip Tongue Control During Bolus Hold - Escape to lateral buccal cavity and/or floor of mouth Bolus prep/mastication - Disorganized mastication with solid pieces of bolus unchewed Bolus transport/lingual motion - Slowed Tongue Motion for A-P movement of the bolus Oral residue - Residue collection on oral structure   PHARYNGEAL PHASE: Initiation of pharyngeal swallow - Bolus head at pit of pyriforms Soft palate elevation - No bolus between soft palate/pharyngeal wall Laryngeal elevation - Complete superior movement of thyroid cartilage with contact of arytenoids to epiglottic petiole Anterior hyoid excursion -  Complete anterior movement Epiglottic movement - Partial inversion Laryngeal vestibule closure - Incomplete - narrow column of air/contrast in laryngeal vestibule Pharyngeal stripping wave - Present, however, diminished Pharyngeal contraction (A/P view) - Complete Pharyngoesophageal segment opening - Partial distension/partial duration with partial obstruction of flow of bolus Tongue base retraction - Narrow column of contrast or air between tongue base and pharyngeal wall Pharyngeal residue - Collection of residue within or on the pharyngeal structures   ESOPHAGEAL PHASE: Esophageal clearance - 20 mL thin liquid trial Complete clearance Puree consistency trial Not evaluated Barium tablet trial Not evaluated     SLP Impressions with Severity Rating: Pt presents with mild/moderate oropharyngeal dysphagia upon completion of modified barium swallow study this date. Swallowing physiology is detailed above. Impairments most impacting swallowing safety and efficiency include bolus prep/mastication, bolus transport/lingual motion and initiation of the pharyngeal swallow. Patient also had reduced epiglottic inversion and reduced laryngeal vestibule closure with thin liquids. Patient demonstrated penetration with suspected aspiration post swallow with thin liquids via straw due to deep penetration.  He also had penetration that cleared without evidence of aspiration with thin liquids via cup for both single and multiple sips.  No evidence of aspiration seen with cup sips of thin liquids.  He was also able to tolerate nectar thick liquids and had penetration with nectar thick liquids that cleared without evidence of aspiration.  No further penetration was observed for any other consistency. Patient demonstrated mild-moderate oral and pharyngeal residue that was reduced with second and third swallow.  Recommending a soft bite-size diet with thin liquids and no straws.     RADIOLOGY FINDINGS: Prompt initiation of the swallowing  mechanism was observed. Deep laryngeal penetration was seen with thin barium liquid via straw with probable aspiration. Laryngeal penetration also observed with nectar thick barium liquid.   Radiology section of this report signed by Dr. Joseph.       Deep laryngeal penetration and probable aspiration of thin barium liquid via straw as well as laryngeal penetration with nectar thick liquid.   Please see speech pathology section of the report for additional details and recommendations.   MACRO: None   Signed by: Boris Joseph 11/22/2024 1:37 PM Dictation workstation:   [id number]    ECG 12 Lead    Result Date: 11/22/2024  Atrial fibrillation with rapid ventricular response Septal infarct (cited on or before 09-MAY-2020) Abnormal ECG When compared with ECG of 21-NOV-2024 07:17, (unconfirmed) Questionable change in initial forces of Anterior leads    ECG 12 Lead    Result Date: 11/21/2024  Atrial fibrillation with rapid ventricular response Anteroseptal infarct (cited on or before 09-MAY-2020) Abnormal ECG When compared with ECG of 21-NOV-2024 07:17, (unconfirmed) No significant change was found    Transthoracic Echo (TTE) Complete    Result Date: 11/20/2024          [address] [address]  Tel [phone] Fax [phone] TRANSTHORACIC ECHOCARDIOGRAM REPORT Patient Name:       SAWYER LIN  Reading Physician:    [id number] Isac Pereira MD, [facility] Study Date:         11/20/2024          Ordering Provider:    [id number] RADAMES DEL REAL MRN/PID:            [id number]            Fellow: Accession#:         [id number]        Nurse: Date of Birth/Age:  1955 / 69      Sonographer:          Stacie jiménez                                     [facility] Gender Assigned at  M                   Additional Staff: Birth: Height:              172.72 cm           Admit Date: Weight:             58.06 kg            Admission Status:     Inpatient -                                                               Routine BSA / BMI:          1.69 m2 / 19.46     Department Location:  [facility]/m2                                     Echo Lab Blood Pressure: 152 /91 mmHg Study Type:    TRANSTHORACIC ECHO (TTE) COMPLETE Diagnosis/ICD: Unspecified atrial fibrillation-I48.91; Non ST elevation (NSTEMI)                myocardial infarction-I21.4 Indication:    Atrial fibrillation with RVR; NSTEMI CPT Codes:     Echo Complete w Full [id number] Patient History: Smoker:            Current. Pertinent History: A-Fib and ETOH abuse. Study Detail: The following Echo studies were performed: 2D, M-Mode, color flow               and Doppler. Technically challenging study due to patient lying in               supine position and body habitus. Definity used as a contrast               agent for endocardial border definition. Total contrast used for               this procedure was 2.0 mL via IV push.  PHYSICIAN INTERPRETATION: Left Ventricle: Left ventricular ejection fraction is low normal, by visual estimate at 50-55%. Wall motion is abnormal. The left ventricular cavity size is normal. There is mild increased septal and normal posterior left ventricular wall thickness. Left ventricular diastolic filling was indeterminate. Underlying atrial fibrillation. Moderate hypokinesis of the basal inferior wall. Left Atrium: The left atrium was not assessed. Right Ventricle: The right ventricle is normal in size. There is normal right ventricular global systolic function. Normal right ventricular chamber size and function. Right Atrium: The right atrium is normal in size. Aortic Valve: The aortic valve is trileaflet. There is mild to moderate aortic valve cusp calcification. There is no evidence of aortic valve regurgitation. The peak instantaneous gradient  of the aortic valve is 3 mmHg. Mitral Valve: The mitral valve is mildly thickened. There is trace to mild mitral valve regurgitation. Trivial to 1+ mitral regurgitation. Tricuspid Valve: The tricuspid valve is structurally normal. There is trace tricuspid regurgitation. The right ventricular systolic pressure is unable to be estimated. Trivial tricuspid regurgitation. Pulmonic Valve: The pulmonic valve is structurally normal. There is no indication of pulmonic valve regurgitation. Pericardium: No pericardial effusion noted. Aorta: The aortic root is normal. Systemic Veins: The inferior vena cava appears normal in size, with IVC inspiratory collapse greater than 50%. In comparison to the previous echocardiogram(s): Comparison study dated 20 showed an LVEF 60%. Trivial MR and TR.  CONCLUSIONS:  1. Left ventricular ejection fraction is low normal, by visual estimate at 50-55%.  2. Abnormal wall motion.  3. Left ventricular diastolic filling was indeterminate.  4. Underlying atrial fibrillation.     Moderate hypokinesis of the basal inferior wall.  5. There is normal right ventricular global systolic function.  6. Trivial tricuspid regurgitation.  7. Comparison study dated 20 showed an LVEF 60%. Trivial MR and TR. QUANTITATIVE DATA SUMMARY:  2D MEASUREMENTS:           Normal Ranges: LAs:             3.42 cm   (2.7-4.0cm) IVSd:            1.07 cm   (0.6-1.1cm) LVPWd:           0.94 cm   (0.6-1.1cm) LVPWs:           0.58 cm LVIDd:           4.59 cm   (3.9-5.9cm) LVIDs:           3.74 cm LV Mass Index:   94.6 g/m2 LV % FS          18.4 %  LV SYSTOLIC FUNCTION BY 2D PLANIMETRY (MOD):                      Normal Ranges: EF-A4C View:    45 % (>=55%) EF-A2C View:    42 % EF-Biplane:     45 % EF-Visual:      53 % LV EF Reported: 53 %  AORTIC VALVE:          Normal Ranges: AoV Vmax:     0.82 m/s (<=1.7m/s) AoV Peak P.7 mmHg (<20mmHg) LVOT Max Gino: 0.53 m/s (<=1.1m/s) LVOT VTI:     8.89 cm  TRICUSPID VALVE/RVSP:          Normal Ranges: IVC Diam:             1.48 cm  PULMONIC VALVE:          Normal Ranges: PV Accel Time:  127 msec (>120ms) PV Max Gino:     0.6 m/s  (0.6-0.9m/s) PV Max P.4 mmHg  [id number] Isac Pereira MD, [facility] Electronically signed on 2024 at 5:13:24 PM  ** Final **     ECG 12 Lead    Result Date: 2024  Atrial fibrillation with premature ventricular or aberrantly conducted complexes Septal infarct (cited on or before 09-MAY-2020) Abnormal ECG When compared with ECG of 2024 06:55, T wave inversion no longer evident in Inferior leads T wave inversion no longer evident in Lateral leads Confirmed by Isac Pereira ([id number]) on 2024 12:05:30 PM    ECG 12 lead    Result Date: 2024  Atrial fibrillation Rightward axis Anteroseptal infarct (cited on or before 09-MAY-2020) Marked ST abnormality, possible inferior subendocardial injury Abnormal ECG When compared with ECG of 2024 06:02, (unconfirmed) No significant change was found See ED provider note for full interpretation and clinical correlation Confirmed by Leslee Del Angel ([id number]) on 2024 11:04:23 AM    ECG 12 lead    Result Date: 2024  Atrial fibrillation with rapid ventricular response with premature ventricular or aberrantly conducted complexes Anteroseptal infarct (cited on or before 09-MAY-2020) Marked ST abnormality, possible inferior subendocardial injury Abnormal ECG When compared with ECG of 09-MAY-2020 09:47, ST more depressed in Inferior leads Inverted T waves have replaced nonspecific T wave abnormality in Inferior leads T wave inversion now evident in Lateral leads See ED provider note for full interpretation and clinical correlation Confirmed by Leslee Del Angel ([id number]) on 2024 11:03:59 AM    ECG 12 lead    Result Date: 2024  Atrial fibrillation with rapid ventricular response Rightward axis Anteroseptal infarct (cited on or before 09-MAY-2020) Marked ST abnormality, possible  inferior subendocardial injury Abnormal ECG When compared with ECG of 19-NOV-2024 05:59, (unconfirmed) No significant change was found See ED provider note for full interpretation and clinical correlation Confirmed by Leslee Del Angel ([id number]) on 11/19/2024 11:03:40 AM    XR chest 1 view    Result Date: 11/19/2024  Interpreted By:  Finkelstein, Evan, STUDY: XR CHEST 1 VIEW;  11/19/2024 5:24 am   INDICATION: Signs/Symptoms:aspiration.     COMPARISON: Chest radiograph 05/09/2020   ACCESSION NUMBER(S): [id number]   ORDERING CLINICIAN: PILAR EASLEY   FINDINGS: Median sternotomy wires are present.   CARDIOMEDIASTINAL SILHOUETTE: Cardiomediastinal silhouette is stable in size and configuration.   LUNGS: Retrocardiac airspace opacity. Skin folds overlie the right hemithorax. No sizable pleural effusion or pneumothorax.   ABDOMEN: No remarkable upper abdominal findings.   BONES: No acute osseous abnormality.       Retrocardiac airspace opacity which may represent atelectasis or pneumonia/aspiration in the appropriate clinical setting.   MACRO: None.   Signed by: Evan Finkelstein 11/19/2024 6:21 AM Dictation workstation:   [id number]    CT head wo IV contrast    Result Date: 11/19/2024  Interpreted By:  Finkelstein, Evan, STUDY: CT HEAD WO IV CONTRAST;  11/19/2024 5:23 am   INDICATION: Signs/Symptoms:ams.     COMPARISON: CT brain 05/12/2020   ACCESSION NUMBER(S): [id number]   ORDERING CLINICIAN: PILAR EASLEY   TECHNIQUE: Axial noncontrast CT images of the head with coronal and sagittal reconstructions.   FINDINGS: EXTRACRANIAL SOFT TISSUES: Unremarkable.   CALVARIUM: No depressed skull fracture. No destructive osseous lesion.   PARANASAL SINUSES/MASTOIDS: The visualized paranasal sinuses and mastoid air cells are aerated.   HEMORRHAGE: No acute intracranial hemorrhage.   BRAIN PARENCHYMA: Gray-white matter interfaces are preserved. No mass effect or midline shift. There are nonspecific scattered white matter  hypodensities.   VENTRICLES and EXTRA-AXIAL SPACES: Parenchymal atrophy with prominence of the ventricles and cortical sulci.   OTHER FINDINGS: There are calcifications within the cavernous carotids       No acute intracranial hemorrhage, mass effect or midline shift.   Nonspecific scattered white matter hypodensities favored to represent sequela of small vessel ischemia.     MACRO: None.   Signed by: Evan Finkelstein 11/19/2024 5:31 AM Dictation workstation:   [id number]              Assessment/Plan        Atrial Fibrillation with RVR  -Currently rate controlled  -Cardiology following  -Continue to monitor on telemetry  -Echocardiogram on 11/20 showed low normal LVEF 50-55%, abnormal wall motion, moderate basal inferior wall hypokinesis, normal right ventricular global systolic function, trivial TR  -Transition from therapeutic Lovenox to home Pradaxa today as patient taking PO  -Continue metoprolol tartrate 50 mg BID for rate control  -Goal K > 4 and Mg > 2. Will give Klor-con 40 mEq PO x 1 and magnesium oxide 800 mg PO x 1  -Plan for outpatient follow up with Dr. Delgado with consideration of cardioversion at later date if persistent.    Alcohol Withdrawal with Complication  -Continue CIWA scoring (no elevated readings last 48H)  -Continue oral phenobarbital taper. Currently at 32.4 mg PO TID (to complete 11/24)  -Continue thiamine, folic acid, MVI daily  -Suggest OP counseling/treatment once withdrawal taper complete    Dysphagia  -Appreciate speech therapy input  -MBS completed  -Rec soft, bite size meals with thin liquids    Acute Kidney Injury  -Resolved with resuscitation  -Continue to monitor renal function with PO intake    Hypokalemia  Hypomagnesemia  Hypophosphatemia  -Levels overall improving  -Optimize potassium and magnesium levels as noted above  -Will give 15 mmol sodium phosphate today IVPB for hypophosphatemia  -Recheck levels tomorrow AM    Community Acquired Pneumonia  -Suggested by CXR on  11/19  -Received azithromycin and completing five day course of ceftriaxone today  -Afebrile on room air with normal WBC count  -Consider imaging for resolution in late December    Severe Protein-Calorie Malnutrition  -Appreciate dietitian input  -Continue supplementation      DVT Prophylaxis: on Pradaxa  Dispo: PT/OT rec mod intensity LOC. Requests  [facility], able to accept once medically stable and off CIWA          Malnutrition Diagnosis Status: New  Malnutrition Diagnosis: Severe malnutrition related to starvation  As Evidenced by: severe muscle wasting, severe fat loss, progressive wt loss over the past 8 months, pt likely meeting < 75% of estimated energy requiriements prior to admission  I agree with the dietitian's malnutrition diagnosis.         Jose Aguilera MD

## 2024-11-23 NOTE — NURSING NOTE
Pt was transferred to the floor. Transport came to notify this nurse in another patients room that the patient had arrived to the floor. On my way to assess the patient, was notified by other nursing staff that the patient had an unwitnessed fall. No apparent injuries, although patient did state that he hit his head. Neuro assessment completed, Dr and Nursing supervisor were made aware and showed up at bed side. Pts wife was contacted as well. STAT CT of head ordered at time of doctor at bedside. Pt educated at this time to please call for assistance, call light within in reach, and bed alarm on.

## 2024-11-23 NOTE — NURSING NOTE
Report received from SB Mccord. Assumed care of patient. Patient resting in bed with call light in reach and bed alarm on.

## 2024-11-23 NOTE — CARE PLAN
The patient's goals for the shift include pt unabl to answer    The clinical goals for the shift include Patient will remain hemodynamically stable over the course of my nursing shift.      Problem: Pain - Adult  Goal: Verbalizes/displays adequate comfort level or baseline comfort level  11/23/2024 0058 by Cassandra Mccollum RN  Outcome: Progressing  11/23/2024 0057 by Cassandra Mccollum RN  Outcome: Progressing   Problem: Fall/Injury  Goal: Not fall by end of shift  11/23/2024 0058 by Cassandra Mccollum RN  Outcome: Not Progressing  Note: Patient had fall previous shift. Patient educated on how to use the call light for staff assist before getting up from bed to prevent fall/injury. Patient is confused and will need frequent re-orienting, and frequent rounding for safety.  11/23/2024 0057 by Cassandra Mccollum RN  Outcome: Progressing

## 2024-11-24 LAB
ALBUMIN SERPL BCP-MCNC: 3.4 G/DL (ref 3.4–5)
ALP SERPL-CCNC: 74 U/L (ref 33–136)
ALT SERPL W P-5'-P-CCNC: 14 U/L (ref 10–52)
ANION GAP SERPL CALC-SCNC: 12 MMOL/L (ref 10–20)
AST SERPL W P-5'-P-CCNC: 24 U/L (ref 9–39)
ATRIAL RATE: 102 BPM
ATRIAL RATE: 150 BPM
ATRIAL RATE: 46 BPM
BASOPHILS # BLD AUTO: 0.07 X10*3/UL (ref 0–0.1)
BASOPHILS NFR BLD AUTO: 0.9 %
BILIRUB SERPL-MCNC: 0.5 MG/DL (ref 0–1.2)
BUN SERPL-MCNC: 12 MG/DL (ref 6–23)
CALCIUM SERPL-MCNC: 9.4 MG/DL (ref 8.6–10.3)
CHLORIDE SERPL-SCNC: 101 MMOL/L (ref 98–107)
CO2 SERPL-SCNC: 28 MMOL/L (ref 21–32)
CREAT SERPL-MCNC: 0.95 MG/DL (ref 0.5–1.3)
EGFRCR SERPLBLD CKD-EPI 2021: 86 ML/MIN/1.73M*2
EOSINOPHIL # BLD AUTO: 0.44 X10*3/UL (ref 0–0.4)
EOSINOPHIL NFR BLD AUTO: 5.4 %
ERYTHROCYTE [DISTWIDTH] IN BLOOD BY AUTOMATED COUNT: 14.3 % (ref 11.5–14.5)
GLUCOSE SERPL-MCNC: 124 MG/DL (ref 74–99)
HCT VFR BLD AUTO: 47.4 % (ref 41–52)
HGB BLD-MCNC: 15.2 G/DL (ref 13.5–17.5)
HOLD SPECIMEN: NORMAL
IMM GRANULOCYTES # BLD AUTO: 0.07 X10*3/UL (ref 0–0.5)
IMM GRANULOCYTES NFR BLD AUTO: 0.9 % (ref 0–0.9)
LYMPHOCYTES # BLD AUTO: 1.34 X10*3/UL (ref 0.8–3)
LYMPHOCYTES NFR BLD AUTO: 16.4 %
MAGNESIUM SERPL-MCNC: 1.72 MG/DL (ref 1.6–2.4)
MCH RBC QN AUTO: 31 PG (ref 26–34)
MCHC RBC AUTO-ENTMCNC: 32.1 G/DL (ref 32–36)
MCV RBC AUTO: 97 FL (ref 80–100)
MONOCYTES # BLD AUTO: 0.82 X10*3/UL (ref 0.05–0.8)
MONOCYTES NFR BLD AUTO: 10.1 %
NEUTROPHILS # BLD AUTO: 5.41 X10*3/UL (ref 1.6–5.5)
NEUTROPHILS NFR BLD AUTO: 66.3 %
NRBC BLD-RTO: 0 /100 WBCS (ref 0–0)
PHOSPHATE SERPL-MCNC: 3 MG/DL (ref 2.5–4.9)
PLATELET # BLD AUTO: 328 X10*3/UL (ref 150–450)
POTASSIUM SERPL-SCNC: 3.9 MMOL/L (ref 3.5–5.3)
PROT SERPL-MCNC: 7.2 G/DL (ref 6.4–8.2)
Q ONSET: 223 MS
Q ONSET: 224 MS
Q ONSET: 226 MS
QRS COUNT: 16 BEATS
QRS COUNT: 16 BEATS
QRS COUNT: 19 BEATS
QRS DURATION: 76 MS
QT INTERVAL: 354 MS
QT INTERVAL: 366 MS
QT INTERVAL: 368 MS
QTC CALCULATION(BAZETT): 469 MS
QTC CALCULATION(BAZETT): 477 MS
QTC CALCULATION(BAZETT): 483 MS
QTC FREDERICIA: 433 MS
QTC FREDERICIA: 436 MS
QTC FREDERICIA: 436 MS
R AXIS: 73 DEGREES
R AXIS: 74 DEGREES
R AXIS: 77 DEGREES
RBC # BLD AUTO: 4.91 X10*6/UL (ref 4.5–5.9)
SODIUM SERPL-SCNC: 137 MMOL/L (ref 136–145)
T AXIS: 44 DEGREES
T AXIS: 60 DEGREES
T AXIS: 68 DEGREES
T OFFSET: 400 MS
T OFFSET: 408 MS
T OFFSET: 409 MS
VENTRICULAR RATE: 102 BPM
VENTRICULAR RATE: 112 BPM
VENTRICULAR RATE: 98 BPM
WBC # BLD AUTO: 8.2 X10*3/UL (ref 4.4–11.3)

## 2024-11-24 PROCEDURE — 2500000001 HC RX 250 WO HCPCS SELF ADMINISTERED DRUGS (ALT 637 FOR MEDICARE OP)

## 2024-11-24 PROCEDURE — 36415 COLL VENOUS BLD VENIPUNCTURE: CPT

## 2024-11-24 PROCEDURE — 84100 ASSAY OF PHOSPHORUS: CPT

## 2024-11-24 PROCEDURE — 2500000002 HC RX 250 W HCPCS SELF ADMINISTERED DRUGS (ALT 637 FOR MEDICARE OP, ALT 636 FOR OP/ED): Performed by: INTERNAL MEDICINE

## 2024-11-24 PROCEDURE — 2500000004 HC RX 250 GENERAL PHARMACY W/ HCPCS (ALT 636 FOR OP/ED): Performed by: INTERNAL MEDICINE

## 2024-11-24 PROCEDURE — 99232 SBSQ HOSP IP/OBS MODERATE 35: CPT | Performed by: INTERNAL MEDICINE

## 2024-11-24 PROCEDURE — 2500000001 HC RX 250 WO HCPCS SELF ADMINISTERED DRUGS (ALT 637 FOR MEDICARE OP): Performed by: INTERNAL MEDICINE

## 2024-11-24 PROCEDURE — 1200000002 HC GENERAL ROOM WITH TELEMETRY DAILY

## 2024-11-24 PROCEDURE — 85025 COMPLETE CBC W/AUTO DIFF WBC: CPT

## 2024-11-24 PROCEDURE — 83735 ASSAY OF MAGNESIUM: CPT

## 2024-11-24 PROCEDURE — 80053 COMPREHEN METABOLIC PANEL: CPT

## 2024-11-24 RX ORDER — POTASSIUM CHLORIDE 750 MG/1
10 TABLET, FILM COATED, EXTENDED RELEASE ORAL DAILY
Status: DISCONTINUED | OUTPATIENT
Start: 2024-11-24 | End: 2024-11-25 | Stop reason: HOSPADM

## 2024-11-24 RX ORDER — MAGNESIUM SULFATE HEPTAHYDRATE 40 MG/ML
2 INJECTION, SOLUTION INTRAVENOUS ONCE
Status: COMPLETED | OUTPATIENT
Start: 2024-11-24 | End: 2024-11-24

## 2024-11-24 RX ADMIN — DABIGATRAN ETEXILATE 150 MG: 150 CAPSULE ORAL at 20:08

## 2024-11-24 RX ADMIN — ACETAMINOPHEN 650 MG: 325 TABLET ORAL at 09:47

## 2024-11-24 RX ADMIN — POTASSIUM CHLORIDE 10 MEQ: 750 TABLET, EXTENDED RELEASE ORAL at 12:06

## 2024-11-24 RX ADMIN — Medication 100 MG: at 09:39

## 2024-11-24 RX ADMIN — METOPROLOL TARTRATE 75 MG: 50 TABLET, FILM COATED ORAL at 09:40

## 2024-11-24 RX ADMIN — DABIGATRAN ETEXILATE 150 MG: 150 CAPSULE ORAL at 09:40

## 2024-11-24 RX ADMIN — MAGNESIUM SULFATE HEPTAHYDRATE 2 G: 40 INJECTION, SOLUTION INTRAVENOUS at 12:06

## 2024-11-24 RX ADMIN — PHENOBARBITAL 32.4 MG: 32.4 TABLET ORAL at 01:15

## 2024-11-24 RX ADMIN — METOPROLOL TARTRATE 75 MG: 50 TABLET, FILM COATED ORAL at 20:08

## 2024-11-24 RX ADMIN — PHENOBARBITAL 32.4 MG: 32.4 TABLET ORAL at 09:40

## 2024-11-24 RX ADMIN — Medication 1 TABLET: at 09:40

## 2024-11-24 RX ADMIN — PHENOBARBITAL 32.4 MG: 32.4 TABLET ORAL at 17:52

## 2024-11-24 ASSESSMENT — COGNITIVE AND FUNCTIONAL STATUS - GENERAL
CLIMB 3 TO 5 STEPS WITH RAILING: TOTAL
TURNING FROM BACK TO SIDE WHILE IN FLAT BAD: A LOT
MOBILITY SCORE: 10
HELP NEEDED FOR BATHING: A LOT
MOVING TO AND FROM BED TO CHAIR: A LOT
DRESSING REGULAR LOWER BODY CLOTHING: A LOT
EATING MEALS: A LOT
DRESSING REGULAR UPPER BODY CLOTHING: A LOT
DAILY ACTIVITIY SCORE: 12
MOVING FROM LYING ON BACK TO SITTING ON SIDE OF FLAT BED WITH BEDRAILS: A LOT
WALKING IN HOSPITAL ROOM: TOTAL
HELP NEEDED FOR BATHING: A LOT
PERSONAL GROOMING: A LOT
TOILETING: A LOT
DRESSING REGULAR UPPER BODY CLOTHING: A LOT
WALKING IN HOSPITAL ROOM: TOTAL
STANDING UP FROM CHAIR USING ARMS: A LOT
EATING MEALS: A LOT
STANDING UP FROM CHAIR USING ARMS: A LOT
MOBILITY SCORE: 10
PERSONAL GROOMING: A LOT
MOVING TO AND FROM BED TO CHAIR: A LOT
MOVING FROM LYING ON BACK TO SITTING ON SIDE OF FLAT BED WITH BEDRAILS: A LOT
TOILETING: A LOT
TURNING FROM BACK TO SIDE WHILE IN FLAT BAD: A LOT
DRESSING REGULAR LOWER BODY CLOTHING: A LOT
DAILY ACTIVITIY SCORE: 12
CLIMB 3 TO 5 STEPS WITH RAILING: TOTAL

## 2024-11-24 ASSESSMENT — LIFESTYLE VARIABLES
VISUAL DISTURBANCES: NOT PRESENT
AGITATION: NORMAL ACTIVITY
ORIENTATION AND CLOUDING OF SENSORIUM: CANNOT DO SERIAL ADDITIONS OR IS UNCERTAIN ABOUT DATE
AGITATION: NORMAL ACTIVITY
HEADACHE, FULLNESS IN HEAD: NOT PRESENT
TOTAL SCORE: 1
PAROXYSMAL SWEATS: NO SWEAT VISIBLE
PAROXYSMAL SWEATS: NO SWEAT VISIBLE
VISUAL DISTURBANCES: NOT PRESENT
TOTAL SCORE: 1
TOTAL SCORE: 1
ORIENTATION AND CLOUDING OF SENSORIUM: CANNOT DO SERIAL ADDITIONS OR IS UNCERTAIN ABOUT DATE
NAUSEA AND VOMITING: NO NAUSEA AND NO VOMITING
NAUSEA AND VOMITING: NO NAUSEA AND NO VOMITING
ANXIETY: NO ANXIETY, AT EASE
ANXIETY: NO ANXIETY, AT EASE
PAROXYSMAL SWEATS: NO SWEAT VISIBLE
ORIENTATION AND CLOUDING OF SENSORIUM: CANNOT DO SERIAL ADDITIONS OR IS UNCERTAIN ABOUT DATE
NAUSEA AND VOMITING: NO NAUSEA AND NO VOMITING
AUDITORY DISTURBANCES: NOT PRESENT
TOTAL SCORE: 1
VISUAL DISTURBANCES: NOT PRESENT
ANXIETY: NO ANXIETY, AT EASE
AUDITORY DISTURBANCES: NOT PRESENT
ANXIETY: NO ANXIETY, AT EASE
PAROXYSMAL SWEATS: NO SWEAT VISIBLE
HEADACHE, FULLNESS IN HEAD: NOT PRESENT
ANXIETY: NO ANXIETY, AT EASE
NAUSEA AND VOMITING: NO NAUSEA AND NO VOMITING
AGITATION: NORMAL ACTIVITY
PAROXYSMAL SWEATS: NO SWEAT VISIBLE
TREMOR: NO TREMOR
NAUSEA AND VOMITING: NO NAUSEA AND NO VOMITING
TREMOR: NO TREMOR
VISUAL DISTURBANCES: NOT PRESENT
AUDITORY DISTURBANCES: NOT PRESENT
HEADACHE, FULLNESS IN HEAD: NOT PRESENT
HEADACHE, FULLNESS IN HEAD: NOT PRESENT
PAROXYSMAL SWEATS: NO SWEAT VISIBLE
TREMOR: NO TREMOR
AGITATION: NORMAL ACTIVITY
TREMOR: NO TREMOR
VISUAL DISTURBANCES: NOT PRESENT
HEADACHE, FULLNESS IN HEAD: NOT PRESENT
AUDITORY DISTURBANCES: NOT PRESENT
TREMOR: NO TREMOR
ANXIETY: NO ANXIETY, AT EASE
ORIENTATION AND CLOUDING OF SENSORIUM: CANNOT DO SERIAL ADDITIONS OR IS UNCERTAIN ABOUT DATE
HEADACHE, FULLNESS IN HEAD: NOT PRESENT
AUDITORY DISTURBANCES: NOT PRESENT
TREMOR: NO TREMOR
NAUSEA AND VOMITING: NO NAUSEA AND NO VOMITING
AUDITORY DISTURBANCES: NOT PRESENT
TOTAL SCORE: 1
TOTAL SCORE: 1
ORIENTATION AND CLOUDING OF SENSORIUM: CANNOT DO SERIAL ADDITIONS OR IS UNCERTAIN ABOUT DATE
ORIENTATION AND CLOUDING OF SENSORIUM: CANNOT DO SERIAL ADDITIONS OR IS UNCERTAIN ABOUT DATE
VISUAL DISTURBANCES: NOT PRESENT

## 2024-11-24 ASSESSMENT — PAIN SCALES - WONG BAKER: WONGBAKER_NUMERICALRESPONSE: HURTS LITTLE BIT

## 2024-11-24 ASSESSMENT — PAIN DESCRIPTION - LOCATION: LOCATION: BUTTOCKS

## 2024-11-24 ASSESSMENT — PAIN SCALES - GENERAL
PAINLEVEL_OUTOF10: 0 - NO PAIN
PAINLEVEL_OUTOF10: 3

## 2024-11-24 NOTE — CARE PLAN
The patient's goals for the shift include patient labs will be within normal range    The clinical goals for the shift include to remain hds

## 2024-11-24 NOTE — NURSING NOTE
RRN follow-up:  Down graded from ICU on 11/22/24. VSS. 94% RA. AFIB on tele rates 70-90s. CIWA 1. No concerns from nursing staff at this time.

## 2024-11-25 VITALS
RESPIRATION RATE: 16 BRPM | BODY MASS INDEX: 18.11 KG/M2 | WEIGHT: 119.49 LBS | HEART RATE: 72 BPM | SYSTOLIC BLOOD PRESSURE: 127 MMHG | OXYGEN SATURATION: 96 % | DIASTOLIC BLOOD PRESSURE: 66 MMHG | TEMPERATURE: 97 F | HEIGHT: 68 IN

## 2024-11-25 VITALS
OXYGEN SATURATION: 99 % | BODY MASS INDEX: 18.04 KG/M2 | DIASTOLIC BLOOD PRESSURE: 87 MMHG | HEART RATE: 78 BPM | HEIGHT: 68 IN | TEMPERATURE: 98.1 F | RESPIRATION RATE: 16 BRPM | SYSTOLIC BLOOD PRESSURE: 141 MMHG | WEIGHT: 119.05 LBS

## 2024-11-25 LAB
ANION GAP SERPL CALC-SCNC: 9 MMOL/L (ref 10–20)
BUN SERPL-MCNC: 19 MG/DL (ref 6–23)
CALCIUM SERPL-MCNC: 9.4 MG/DL (ref 8.6–10.3)
CHLORIDE SERPL-SCNC: 99 MMOL/L (ref 98–107)
CO2 SERPL-SCNC: 29 MMOL/L (ref 21–32)
CREAT SERPL-MCNC: 1.03 MG/DL (ref 0.5–1.3)
EGFRCR SERPLBLD CKD-EPI 2021: 78 ML/MIN/1.73M*2
GLUCOSE SERPL-MCNC: 107 MG/DL (ref 74–99)
HOLD SPECIMEN: NORMAL
HOLD SPECIMEN: NORMAL
MAGNESIUM SERPL-MCNC: 1.9 MG/DL (ref 1.6–2.4)
POTASSIUM SERPL-SCNC: 4.4 MMOL/L (ref 3.5–5.3)
SODIUM SERPL-SCNC: 133 MMOL/L (ref 136–145)

## 2024-11-25 PROCEDURE — 36415 COLL VENOUS BLD VENIPUNCTURE: CPT | Performed by: INTERNAL MEDICINE

## 2024-11-25 PROCEDURE — 80048 BASIC METABOLIC PNL TOTAL CA: CPT | Performed by: INTERNAL MEDICINE

## 2024-11-25 PROCEDURE — 97530 THERAPEUTIC ACTIVITIES: CPT | Mod: GP,CQ

## 2024-11-25 PROCEDURE — 2500000001 HC RX 250 WO HCPCS SELF ADMINISTERED DRUGS (ALT 637 FOR MEDICARE OP): Performed by: INTERNAL MEDICINE

## 2024-11-25 PROCEDURE — 99239 HOSP IP/OBS DSCHRG MGMT >30: CPT | Performed by: INTERNAL MEDICINE

## 2024-11-25 PROCEDURE — 2500000002 HC RX 250 W HCPCS SELF ADMINISTERED DRUGS (ALT 637 FOR MEDICARE OP, ALT 636 FOR OP/ED): Performed by: INTERNAL MEDICINE

## 2024-11-25 PROCEDURE — 92526 ORAL FUNCTION THERAPY: CPT | Mod: GN

## 2024-11-25 PROCEDURE — 97110 THERAPEUTIC EXERCISES: CPT | Mod: GP,CQ

## 2024-11-25 PROCEDURE — 2500000001 HC RX 250 WO HCPCS SELF ADMINISTERED DRUGS (ALT 637 FOR MEDICARE OP)

## 2024-11-25 PROCEDURE — 83735 ASSAY OF MAGNESIUM: CPT | Performed by: INTERNAL MEDICINE

## 2024-11-25 PROCEDURE — 99222 1ST HOSP IP/OBS MODERATE 55: CPT | Performed by: STUDENT IN AN ORGANIZED HEALTH CARE EDUCATION/TRAINING PROGRAM

## 2024-11-25 RX ORDER — MULTIVIT-MIN/IRON FUM/FOLIC AC 7.5 MG-4
1 TABLET ORAL DAILY
Start: 2024-11-26

## 2024-11-25 RX ORDER — METOPROLOL TARTRATE 75 MG/1
75 TABLET, FILM COATED ORAL 2 TIMES DAILY
Start: 2024-11-25

## 2024-11-25 RX ORDER — LANOLIN ALCOHOL/MO/W.PET/CERES
100 CREAM (GRAM) TOPICAL DAILY
Start: 2024-11-26

## 2024-11-25 RX ADMIN — PHENOBARBITAL 32.4 MG: 32.4 TABLET ORAL at 01:12

## 2024-11-25 RX ADMIN — Medication 100 MG: at 09:09

## 2024-11-25 RX ADMIN — METOPROLOL TARTRATE 75 MG: 50 TABLET, FILM COATED ORAL at 09:09

## 2024-11-25 RX ADMIN — PHENOBARBITAL 32.4 MG: 32.4 TABLET ORAL at 09:09

## 2024-11-25 RX ADMIN — POTASSIUM CHLORIDE 10 MEQ: 750 TABLET, EXTENDED RELEASE ORAL at 09:09

## 2024-11-25 RX ADMIN — ACETAMINOPHEN 650 MG: 325 TABLET ORAL at 02:39

## 2024-11-25 RX ADMIN — Medication 1 TABLET: at 09:09

## 2024-11-25 RX ADMIN — DABIGATRAN ETEXILATE 150 MG: 150 CAPSULE ORAL at 09:09

## 2024-11-25 ASSESSMENT — LIFESTYLE VARIABLES
AUDITORY DISTURBANCES: NOT PRESENT
PAROXYSMAL SWEATS: NO SWEAT VISIBLE
TREMOR: NO TREMOR
VISUAL DISTURBANCES: NOT PRESENT
AGITATION: NORMAL ACTIVITY
PAROXYSMAL SWEATS: NO SWEAT VISIBLE
TOTAL SCORE: 1
AUDITORY DISTURBANCES: NOT PRESENT
PAROXYSMAL SWEATS: NO SWEAT VISIBLE
ORIENTATION AND CLOUDING OF SENSORIUM: CANNOT DO SERIAL ADDITIONS OR IS UNCERTAIN ABOUT DATE
HEADACHE, FULLNESS IN HEAD: NOT PRESENT
AGITATION: NORMAL ACTIVITY
ANXIETY: NO ANXIETY, AT EASE
ANXIETY: NO ANXIETY, AT EASE
NAUSEA AND VOMITING: NO NAUSEA AND NO VOMITING
HEADACHE, FULLNESS IN HEAD: NOT PRESENT
TREMOR: NO TREMOR
AGITATION: NORMAL ACTIVITY
HEADACHE, FULLNESS IN HEAD: NOT PRESENT
TOTAL SCORE: 1
ANXIETY: NO ANXIETY, AT EASE
NAUSEA AND VOMITING: NO NAUSEA AND NO VOMITING
ORIENTATION AND CLOUDING OF SENSORIUM: CANNOT DO SERIAL ADDITIONS OR IS UNCERTAIN ABOUT DATE
ORIENTATION AND CLOUDING OF SENSORIUM: CANNOT DO SERIAL ADDITIONS OR IS UNCERTAIN ABOUT DATE
PAROXYSMAL SWEATS: NO SWEAT VISIBLE
VISUAL DISTURBANCES: NOT PRESENT
TOTAL SCORE: 1
AUDITORY DISTURBANCES: NOT PRESENT
TREMOR: NO TREMOR
VISUAL DISTURBANCES: NOT PRESENT
NAUSEA AND VOMITING: NO NAUSEA AND NO VOMITING
TOTAL SCORE: 1
NAUSEA AND VOMITING: NO NAUSEA AND NO VOMITING
AUDITORY DISTURBANCES: NOT PRESENT
AGITATION: NORMAL ACTIVITY
VISUAL DISTURBANCES: NOT PRESENT
TREMOR: NO TREMOR
ANXIETY: NO ANXIETY, AT EASE
ORIENTATION AND CLOUDING OF SENSORIUM: CANNOT DO SERIAL ADDITIONS OR IS UNCERTAIN ABOUT DATE
HEADACHE, FULLNESS IN HEAD: NOT PRESENT

## 2024-11-25 ASSESSMENT — COGNITIVE AND FUNCTIONAL STATUS - GENERAL
STANDING UP FROM CHAIR USING ARMS: A LITTLE
MOVING TO AND FROM BED TO CHAIR: A LITTLE
CLIMB 3 TO 5 STEPS WITH RAILING: A LOT
WALKING IN HOSPITAL ROOM: A LITTLE
TURNING FROM BACK TO SIDE WHILE IN FLAT BAD: A LOT
MOBILITY SCORE: 16
MOVING FROM LYING ON BACK TO SITTING ON SIDE OF FLAT BED WITH BEDRAILS: A LITTLE

## 2024-11-25 ASSESSMENT — PAIN SCALES - GENERAL
PAINLEVEL_OUTOF10: 3
PAINLEVEL_OUTOF10: 1
PAINLEVEL_OUTOF10: 0 - NO PAIN
PAINLEVEL_OUTOF10: 0 - NO PAIN

## 2024-11-25 ASSESSMENT — PAIN - FUNCTIONAL ASSESSMENT
PAIN_FUNCTIONAL_ASSESSMENT: 0-10
PAIN_FUNCTIONAL_ASSESSMENT: 0-10

## 2024-11-25 ASSESSMENT — PAIN DESCRIPTION - LOCATION: LOCATION: GENERALIZED

## 2024-11-25 NOTE — DISCHARGE SUMMARY
DISCHARGE DIAGNOSIS     pAfib with RVR  AUD with acute withdrawal  Acute encephalopathy 2/2 above  Dysphagia  YLOY  Hypokalemia  Hypomagnesemia  Hypophosphatemia  CAP, suspected gram negatives  Severe protein-calorie malnutrition    HOSPITAL COURSE AND DETAILS     Atrial Fibrillation with RVR  -Echocardiogram on 11/20 showed low normal LVEF 50-55%, abnormal wall motion, moderate basal inferior wall hypokinesis, normal right ventricular global systolic function, trivial TR  -rates better controlled with increasing PTA lopressor to 75mg bid, cont on this on dc  -Resumed home Pradaxa  -outpt cards fu     Alcohol Withdrawal with Complication  Acute encephalopathy 2/2 above  -improved with po phenobarb, no longer withdrawing  -okay for dc to acute rehab    Dysphagia  -slp saw, mbs done, rec soft, bite size meals with thin liquids  -cont slp at acute rehab     Acute Kidney Injury  -resolved with ivf     Hypokalemia  Hypomagnesemia  Hypophosphatemia  -repleted, improved     Community Acquired Pneumonia  -Suggested by CXR on 11/19  -completed 5d course abx     Severe Protein-Calorie Malnutrition  -cont protein supplementation at acute rehab    Stable for dc to acute rehab. Total time spent on discharge services 33 minutes.     DISCHARGE PHYSICAL EXAM     Last Recorded Vitals:  Vitals:    11/25/24 0353 11/25/24 0557 11/25/24 0557 11/25/24 0727   BP: 122/71  123/82 141/87   Pulse: 76  69 78   Resp: 17  16    Temp: 35.7 °C (96.3 °F)  36.2 °C (97.2 °F) 36.7 °C (98.1 °F)   TempSrc:       SpO2: 95%  96% 99%   Weight:  54 kg (119 lb 0.8 oz)     Height:           Physical Exam:  GEN: chronically ill appearing, appears stated age, NAD  CV: irregular rhythm, regular rate, no m/r/g, no LE edema  LUNGS: CTAB, no w/r/c  ABD: soft, NT  SKIN: no rashes  MSK; no gross deformities, normal joints  NEURO: no fnd      PERTINENT LABS AND IMAGING     Results for orders placed or performed during the hospital encounter of 11/19/24 (from the past  96 hours)   POCT GLUCOSE   Result Value Ref Range    POCT Glucose 123 (H) 74 - 99 mg/dL   POCT GLUCOSE   Result Value Ref Range    POCT Glucose 120 (H) 74 - 99 mg/dL   POCT GLUCOSE   Result Value Ref Range    POCT Glucose 79 74 - 99 mg/dL   POCT GLUCOSE   Result Value Ref Range    POCT Glucose 84 74 - 99 mg/dL   Lavender Top   Result Value Ref Range    Extra Tube Hold for add-ons.    CBC and Auto Differential   Result Value Ref Range    WBC 10.3 4.4 - 11.3 x10*3/uL    nRBC 0.0 0.0 - 0.0 /100 WBCs    RBC 5.50 4.50 - 5.90 x10*6/uL    Hemoglobin 17.0 13.5 - 17.5 g/dL    Hematocrit 52.0 41.0 - 52.0 %    MCV 95 80 - 100 fL    MCH 30.9 26.0 - 34.0 pg    MCHC 32.7 32.0 - 36.0 g/dL    RDW 14.2 11.5 - 14.5 %    Platelets 306 150 - 450 x10*3/uL    Neutrophils % 73.6 40.0 - 80.0 %    Immature Granulocytes %, Automated 0.3 0.0 - 0.9 %    Lymphocytes % 13.0 13.0 - 44.0 %    Monocytes % 9.6 2.0 - 10.0 %    Eosinophils % 2.8 0.0 - 6.0 %    Basophils % 0.7 0.0 - 2.0 %    Neutrophils Absolute 7.61 (H) 1.60 - 5.50 x10*3/uL    Immature Granulocytes Absolute, Automated 0.03 0.00 - 0.50 x10*3/uL    Lymphocytes Absolute 1.34 0.80 - 3.00 x10*3/uL    Monocytes Absolute 0.99 (H) 0.05 - 0.80 x10*3/uL    Eosinophils Absolute 0.29 0.00 - 0.40 x10*3/uL    Basophils Absolute 0.07 0.00 - 0.10 x10*3/uL   Comprehensive Metabolic Panel   Result Value Ref Range    Glucose 71 (L) 74 - 99 mg/dL    Sodium 138 136 - 145 mmol/L    Potassium 3.5 3.5 - 5.3 mmol/L    Chloride 99 98 - 107 mmol/L    Bicarbonate 27 21 - 32 mmol/L    Anion Gap 16 10 - 20 mmol/L    Urea Nitrogen 9 6 - 23 mg/dL    Creatinine 0.94 0.50 - 1.30 mg/dL    eGFR 87 >60 mL/min/1.73m*2    Calcium 9.5 8.6 - 10.3 mg/dL    Albumin 3.7 3.4 - 5.0 g/dL    Alkaline Phosphatase 71 33 - 136 U/L    Total Protein 7.6 6.4 - 8.2 g/dL    AST 25 9 - 39 U/L    Bilirubin, Total 0.8 0.0 - 1.2 mg/dL    ALT 6 (L) 10 - 52 U/L   Magnesium   Result Value Ref Range    Magnesium 1.87 1.60 - 2.40 mg/dL   Phosphorus    Result Value Ref Range    Phosphorus 2.3 (L) 2.5 - 4.9 mg/dL   POCT GLUCOSE   Result Value Ref Range    POCT Glucose 87 74 - 99 mg/dL   ECG 12 Lead   Result Value Ref Range    Ventricular Rate 102 BPM    Atrial Rate 102 BPM    QRS Duration 76 ms    QT Interval 366 ms    QTC Calculation(Bazett) 477 ms    R Axis 73 degrees    T Axis 68 degrees    QRS Count 16 beats    Q Onset 226 ms    T Offset 409 ms    QTC Fredericia 436 ms   POCT GLUCOSE   Result Value Ref Range    POCT Glucose 168 (H) 74 - 99 mg/dL   CBC and Auto Differential   Result Value Ref Range    WBC 9.0 4.4 - 11.3 x10*3/uL    nRBC 0.0 0.0 - 0.0 /100 WBCs    RBC 5.30 4.50 - 5.90 x10*6/uL    Hemoglobin 16.4 13.5 - 17.5 g/dL    Hematocrit 50.6 41.0 - 52.0 %    MCV 96 80 - 100 fL    MCH 30.9 26.0 - 34.0 pg    MCHC 32.4 32.0 - 36.0 g/dL    RDW 14.4 11.5 - 14.5 %    Platelets 286 150 - 450 x10*3/uL    Neutrophils % 72.5 40.0 - 80.0 %    Immature Granulocytes %, Automated 0.4 0.0 - 0.9 %    Lymphocytes % 15.2 13.0 - 44.0 %    Monocytes % 9.3 2.0 - 10.0 %    Eosinophils % 2.2 0.0 - 6.0 %    Basophils % 0.4 0.0 - 2.0 %    Neutrophils Absolute 6.53 (H) 1.60 - 5.50 x10*3/uL    Immature Granulocytes Absolute, Automated 0.04 0.00 - 0.50 x10*3/uL    Lymphocytes Absolute 1.37 0.80 - 3.00 x10*3/uL    Monocytes Absolute 0.84 (H) 0.05 - 0.80 x10*3/uL    Eosinophils Absolute 0.20 0.00 - 0.40 x10*3/uL    Basophils Absolute 0.04 0.00 - 0.10 x10*3/uL   Comprehensive Metabolic Panel   Result Value Ref Range    Glucose 136 (H) 74 - 99 mg/dL    Sodium 136 136 - 145 mmol/L    Potassium 3.7 3.5 - 5.3 mmol/L    Chloride 99 98 - 107 mmol/L    Bicarbonate 28 21 - 32 mmol/L    Anion Gap 13 10 - 20 mmol/L    Urea Nitrogen 12 6 - 23 mg/dL    Creatinine 0.97 0.50 - 1.30 mg/dL    eGFR 83 >60 mL/min/1.73m*2    Calcium 9.8 8.6 - 10.3 mg/dL    Albumin 3.8 3.4 - 5.0 g/dL    Alkaline Phosphatase 77 33 - 136 U/L    Total Protein 7.8 6.4 - 8.2 g/dL    AST 33 9 - 39 U/L    Bilirubin, Total 0.7  0.0 - 1.2 mg/dL    ALT 14 10 - 52 U/L   Magnesium   Result Value Ref Range    Magnesium 1.97 1.60 - 2.40 mg/dL   Phosphorus   Result Value Ref Range    Phosphorus 1.8 (L) 2.5 - 4.9 mg/dL   SST TOP   Result Value Ref Range    Extra Tube Hold for add-ons.    ECG 12 Lead   Result Value Ref Range    Ventricular Rate 98 BPM    Atrial Rate 46 BPM    QRS Duration 76 ms    QT Interval 368 ms    QTC Calculation(Bazett) 469 ms    R Axis 74 degrees    T Axis 60 degrees    QRS Count 16 beats    Q Onset 224 ms    T Offset 408 ms    QTC Fredericia 433 ms   CBC and Auto Differential   Result Value Ref Range    WBC 8.2 4.4 - 11.3 x10*3/uL    nRBC 0.0 0.0 - 0.0 /100 WBCs    RBC 4.91 4.50 - 5.90 x10*6/uL    Hemoglobin 15.2 13.5 - 17.5 g/dL    Hematocrit 47.4 41.0 - 52.0 %    MCV 97 80 - 100 fL    MCH 31.0 26.0 - 34.0 pg    MCHC 32.1 32.0 - 36.0 g/dL    RDW 14.3 11.5 - 14.5 %    Platelets 328 150 - 450 x10*3/uL    Neutrophils % 66.3 40.0 - 80.0 %    Immature Granulocytes %, Automated 0.9 0.0 - 0.9 %    Lymphocytes % 16.4 13.0 - 44.0 %    Monocytes % 10.1 2.0 - 10.0 %    Eosinophils % 5.4 0.0 - 6.0 %    Basophils % 0.9 0.0 - 2.0 %    Neutrophils Absolute 5.41 1.60 - 5.50 x10*3/uL    Immature Granulocytes Absolute, Automated 0.07 0.00 - 0.50 x10*3/uL    Lymphocytes Absolute 1.34 0.80 - 3.00 x10*3/uL    Monocytes Absolute 0.82 (H) 0.05 - 0.80 x10*3/uL    Eosinophils Absolute 0.44 (H) 0.00 - 0.40 x10*3/uL    Basophils Absolute 0.07 0.00 - 0.10 x10*3/uL   Comprehensive Metabolic Panel   Result Value Ref Range    Glucose 124 (H) 74 - 99 mg/dL    Sodium 137 136 - 145 mmol/L    Potassium 3.9 3.5 - 5.3 mmol/L    Chloride 101 98 - 107 mmol/L    Bicarbonate 28 21 - 32 mmol/L    Anion Gap 12 10 - 20 mmol/L    Urea Nitrogen 12 6 - 23 mg/dL    Creatinine 0.95 0.50 - 1.30 mg/dL    eGFR 86 >60 mL/min/1.73m*2    Calcium 9.4 8.6 - 10.3 mg/dL    Albumin 3.4 3.4 - 5.0 g/dL    Alkaline Phosphatase 74 33 - 136 U/L    Total Protein 7.2 6.4 - 8.2 g/dL    AST  24 9 - 39 U/L    Bilirubin, Total 0.5 0.0 - 1.2 mg/dL    ALT 14 10 - 52 U/L   Magnesium   Result Value Ref Range    Magnesium 1.72 1.60 - 2.40 mg/dL   Phosphorus   Result Value Ref Range    Phosphorus 3.0 2.5 - 4.9 mg/dL   SST TOP   Result Value Ref Range    Extra Tube Hold for add-ons.    Magnesium   Result Value Ref Range    Magnesium 1.90 1.60 - 2.40 mg/dL   Basic Metabolic Panel   Result Value Ref Range    Glucose 107 (H) 74 - 99 mg/dL    Sodium 133 (L) 136 - 145 mmol/L    Potassium 4.4 3.5 - 5.3 mmol/L    Chloride 99 98 - 107 mmol/L    Bicarbonate 29 21 - 32 mmol/L    Anion Gap 9 (L) 10 - 20 mmol/L    Urea Nitrogen 19 6 - 23 mg/dL    Creatinine 1.03 0.50 - 1.30 mg/dL    eGFR 78 >60 mL/min/1.73m*2    Calcium 9.4 8.6 - 10.3 mg/dL   Lavender Top   Result Value Ref Range    Extra Tube Hold for add-ons.    SST TOP   Result Value Ref Range    Extra Tube Hold for add-ons.         CT head wo IV contrast   Final Result   1.  No acute intracranial hemorrhage or depressed calvarial fracture.   2.  Diffuse parenchymal volume loss. Chronic microvascular ischemic   changes.             MACRO:   None.        Signed by: Diana Paiz 11/22/2024 8:35 PM   Dictation workstation:   [id number]      FL modified barium swallow study   Final Result   Deep laryngeal penetration and probable aspiration of thin barium   liquid via straw as well as laryngeal penetration with nectar thick   liquid.        Please see speech pathology section of the report for additional   details and recommendations.        MACRO:   None        Signed by: Boris Joseph 11/22/2024 1:37 PM   Dictation workstation:   [id number]      Transthoracic Echo (TTE) Complete   Final Result      XR chest 1 view   Final Result   Retrocardiac airspace opacity which may represent atelectasis or   pneumonia/aspiration in the appropriate clinical setting.        MACRO:   None.        Signed by: Evan Finkelstein 11/19/2024 6:21 AM   Dictation workstation:    [id number]      CT head wo IV contrast   Final Result   No acute intracranial hemorrhage, mass effect or midline shift.        Nonspecific scattered white matter hypodensities favored to represent   sequela of small vessel ischemia.             MACRO:   None.        Signed by: Evan Finkelstein 11/19/2024 5:31 AM   Dictation workstation:   [id number]          Transthoracic Echo (TTE) Complete    Result Date: 11/20/2024          [place] [id number]  Tel [phone] Fax [phone] TRANSTHORACIC ECHOCARDIOGRAM REPORT Patient Name:       SAWYER LIN  Reading Physician:    [id number] Isac Pereira MD, [facility] Study Date:         11/20/2024          Ordering Provider:    [id number] DEL REAL MRN/PID:            [id number]            Fellow: Accession#:         [id number]        Nurse: Date of Birth/Age:  1955 / 69      Sonographer:          Stacie jiménez RDCS Gender Assigned at  M                   Additional Staff: Birth: Height:             172.72 cm           Admit Date: Weight:             58.06 kg            Admission Status:     Inpatient -                                                               Routine BSA / BMI:          1.69 m2 / 19.46     Department Location:  [place] [address]                                     Echo Lab Blood Pressure: 152 /91 mmHg Study Type:    TRANSTHORACIC ECHO (TTE) COMPLETE Diagnosis/ICD: Unspecified atrial fibrillation-I48.91; Non ST elevation (NSTEMI)                myocardial infarction-I21.4 Indication:    Atrial fibrillation with RVR; NSTEMI CPT Codes:     Echo Complete w Full [id number] Patient History: Smoker:            Current. Pertinent History: A-Fib and ETOH abuse. Study Detail: The following Echo studies  were performed: 2D, M-Mode, color flow               and Doppler. Technically challenging study due to patient lying in               supine position and body habitus. Definity used as a contrast               agent for endocardial border definition. Total contrast used for               this procedure was 2.0 mL via IV push.  PHYSICIAN INTERPRETATION: Left Ventricle: Left ventricular ejection fraction is low normal, by visual estimate at 50-55%. Wall motion is abnormal. The left ventricular cavity size is normal. There is mild increased septal and normal posterior left ventricular wall thickness. Left ventricular diastolic filling was indeterminate. Underlying atrial fibrillation. Moderate hypokinesis of the basal inferior wall. Left Atrium: The left atrium was not assessed. Right Ventricle: The right ventricle is normal in size. There is normal right ventricular global systolic function. Normal right ventricular chamber size and function. Right Atrium: The right atrium is normal in size. Aortic Valve: The aortic valve is trileaflet. There is mild to moderate aortic valve cusp calcification. There is no evidence of aortic valve regurgitation. The peak instantaneous gradient of the aortic valve is 3 mmHg. Mitral Valve: The mitral valve is mildly thickened. There is trace to mild mitral valve regurgitation. Trivial to 1+ mitral regurgitation. Tricuspid Valve: The tricuspid valve is structurally normal. There is trace tricuspid regurgitation. The right ventricular systolic pressure is unable to be estimated. Trivial tricuspid regurgitation. Pulmonic Valve: The pulmonic valve is structurally normal. There is no indication of pulmonic valve regurgitation. Pericardium: No pericardial effusion noted. Aorta: The aortic root is normal. Systemic Veins: The inferior vena cava appears normal in size, with IVC inspiratory collapse greater than 50%. In comparison to the previous echocardiogram(s): Comparison study dated 5/11/20  showed an LVEF 60%. Trivial MR and TR.  CONCLUSIONS:  1. Left ventricular ejection fraction is low normal, by visual estimate at 50-55%.  2. Abnormal wall motion.  3. Left ventricular diastolic filling was indeterminate.  4. Underlying atrial fibrillation.     Moderate hypokinesis of the basal inferior wall.  5. There is normal right ventricular global systolic function.  6. Trivial tricuspid regurgitation.  7. Comparison study dated 20 showed an LVEF 60%. Trivial MR and TR. QUANTITATIVE DATA SUMMARY:  2D MEASUREMENTS:           Normal Ranges: LAs:             3.42 cm   (2.7-4.0cm) IVSd:            1.07 cm   (0.6-1.1cm) LVPWd:           0.94 cm   (0.6-1.1cm) LVPWs:           0.58 cm LVIDd:           4.59 cm   (3.9-5.9cm) LVIDs:           3.74 cm LV Mass Index:   94.6 g/m2 LV % FS          18.4 %  LV SYSTOLIC FUNCTION BY 2D PLANIMETRY (MOD):                      Normal Ranges: EF-A4C View:    45 % (>=55%) EF-A2C View:    42 % EF-Biplane:     45 % EF-Visual:      53 % LV EF Reported: 53 %  AORTIC VALVE:          Normal Ranges: AoV Vmax:     0.82 m/s (<=1.7m/s) AoV Peak P.7 mmHg (<20mmHg) LVOT Max Gino: 0.53 m/s (<=1.1m/s) LVOT VTI:     8.89 cm  TRICUSPID VALVE/RVSP:         Normal Ranges: IVC Diam:             1.48 cm  PULMONIC VALVE:          Normal Ranges: PV Accel Time:  127 msec (>120ms) PV Max Gino:     0.6 m/s  (0.6-0.9m/s) PV Max P.4 mmHg  [id number] Isac Pereira MD, FACC Electronically signed on 2024 at 5:13:24 PM  ** Final **      DISCHARGE MEDICATIONS        Your medication list        START taking these medications        Instructions Last Dose Given Next Dose Due   multivitamin with minerals tablet  Start taking on: 2024      Take 1 tablet by mouth once daily.       thiamine 100 mg tablet  Commonly known as: Vitamin B-1  Start taking on: 2024      Take 1 tablet (100 mg) by mouth once daily.              CHANGE how you take these medications         Instructions Last Dose Given Next Dose Due   metoprolol tartrate 75 mg tablet  Commonly known as: Lopressor  What changed:   medication strength  how much to take  when to take this      Take 1 tablet (75 mg) by mouth 2 times a day.              CONTINUE taking these medications        Instructions Last Dose Given Next Dose Due   cyclobenzaprine 10 mg tablet  Commonly known as: Flexeril           dabigatran etexilate 150 mg capsule  Commonly known as: Pradaxa           gabapentin 400 mg capsule  Commonly known as: Neurontin                     Where to Get Your Medications        Information about where to get these medications is not yet available    Ask your nurse or doctor about these medications  metoprolol tartrate 75 mg tablet  multivitamin with minerals tablet  thiamine 100 mg tablet         OUTPATIENT FOLLOW-UP     No future appointments.

## 2024-11-25 NOTE — PROGRESS NOTES
Inpatient Speech Language Pathology Treatment Note     Patient Name: Ivan Bravo  MRN: [id number]  : 1955  Today's Date: 24  Time Calculation  Start Time: 1230  Stop Time: 1250  Time Calculation (min): 20 min     Room:[address] [place] [address]    Assessment::     Patient seen at bedside today.  Patient had just finished his breakfast and his lunch tray was at bedside.  His wife stated that he would not be eating his lunch today.  Returned to patient's room 30 minutes later to have patient complete base of tongue techniques.  Patient's wife given base of tongue technique sheet to have break patient practice 2-3 times per day.  Patient was able to complete resistive tongue techniques x 10 with a 3-second hold with weak tongue protrusion 5 lateralization to right and left side.  He had difficulty completing the Joie and effortful swallow technique but was able to complete 5 of each.  Patient was able to complete 10 chin tuck to resistance with a 3-second hold with moderate cueing level.  He was able to complete 5 Shaker techniques with a 3-second hold for each technique.  Patient will be discharged to [facility] hospital later today.  Recommend patient continue with speech therapy at [facility].  Continue plan.    Plan:     Skilled speech therapy for dysphagia treatment continues to be warranted to provide training and instruction regarding the use of compensatory swallow strategies, to complete oropharyngeal strengthening exercises, for pt/caregiver education in order to reduce risk of aspiration, dehydration and malnutrition. , to assess tolerance of diet , to determine ability to upgrade diet after PO trials with SLP  SLP TX Plan: Continue Plan of Care  SLP Frequency: 3x per week  Discussed POC: Patient, Caregiver/family  Discussed Risks/Benefits: Yes, Caregiver/Family, Patient  Patient/Caregiver Agreeable: Yes       Pain:     Pain Assessment  Pain Assessment: 0-10  0-10 (Numeric) Pain  Score: 0 - No pain       Subjective:     Pt. seen at bedside for skilled dysphagia treatment.   Prior to Session Communication: Bedside nurse        Oxygen status:     nasal cannula     RECOMMENDATIONS:     Solid Diet Recommendations: Soft & bite sized/chopped (IDDSI Level 6)  Liquid Diet Recommendations: Thin (IDDSI Level 0)  Compensatory strategies: small bites/sips, alternate bites/sips, upright 90 degrees for intake   Medication administration:          Goals:     1.  Patient will be able to tolerate soft bite-size diet with thin liquids without overt signs symptoms aspiration or pulmonary compromise.  Start Date: 11/22/24  Progress: Goal not addressed today.  Status: Continue goal     2.  Patient will be able to use compensatory swallowing strategies including sitting upright while eating, slow rate of intake, alternating bites/sips and no straws.  Start Date: 11/22/24  Progress: Reviewed compensatory swallowing strategies with patient.  Status: Continue goal     3.  Patient will be able to complete base of tongue techniques 2-3 times per day independently including resistive tongue techniques, Joie technique, effortful swallow technique, chin tuck to resistance and Shaker technique.  Start Date: 11/25/2024  Progress: See above for details.  Status: Continue goal    Education:     Pt. given skilled instruction on completion of base of tongue techniques 2-3 times per day.  Pt. gave verbal understanding    Base of Tongue Exercises:     The base of the tongue is the ``pump´´ that pushes food through the throat and into the esophagus.  These exercises are designed to strengthen the base of the tongue.    1. Resistive Tongue Techniques  -Push your tongue straight out against a tongue depressor or spoon ten times. (Hold for 3 seconds)  -Push your tongue to the right five times and then to left five times against the tongue depressor or spoon (Hold for 3 seconds)    2. Joie Exercise:  - Stick out your tongue and  hold it between your lips or teeth and try to swallow.  - Repeat the exercise 5 times    3. Effortful Swallow  - Swallow as hard as you can with food or saliva.  Push as hard as you can with the tongue against the roof of your mouth while you swallow  - Repeat the exercise 5 times    4. Chin tuck to Resistance:  Make a fist, put your fist under your chin and press down for 3 seconds.     5. Shaker Technique  - Lay flat on a bed, recliner or sofa.    1. Raise your head up off the pillow and look down at your toes x 3 seconds for 10 repetitions.   2. Raise your head up off the pillow and look down at your toes x 60 seconds x 1.  3. Raise your head up off the pillow and look down at your toes x 10 for 1 second x 10.

## 2024-11-25 NOTE — PROGRESS NOTES
Physical Therapy    Physical Therapy Treatment    Patient Name: Ivan Bravo  MRN: [id number]  Today's Date: 11/25/2024  Time Calculation  Start Time: 0922  Stop Time: 0950  Time Calculation (min): 28 min     1008/1008-B    Assessment/Plan   PT Assessment  PT Assessment Results: Decreased strength, Decreased endurance, Impaired balance, Decreased mobility, Decreased coordination, Decreased cognition, Impaired judgement, Decreased safety awareness  Rehab Prognosis: Good  Treatment Tolerance: Patient tolerated treatment well, Patient limited by fatigue  Medical Staff Made Aware: Yes  End of Session Communication: Bedside nurse, PCT/NA/TUAN  Assessment Comment: Patient continues to require (A) for safety with transfers/amb; showing progress towards goals. Patient will benefit from additional PT to address deficits and improve mobility.  End of Session Patient Position: Up in chair, Alarm off, caregiver present (Call light, phone, and tray table within reach.)  PT Plan  Inpatient/Swing Bed or Outpatient: Inpatient  Treatment/Interventions: Bed mobility, Transfer training, Gait training, Stair training, Balance training, Strengthening, Endurance training, Therapeutic exercise, Therapeutic activity  PT Plan: Ongoing PT  PT Frequency: 3 times per week  PT Discharge Recommendations: Moderate intensity level of continued care    PT Recommended Transfer Status: Assist x1, ww    General Visit Information:   PT  Visit  PT Received On: 11/25/24  General  Family/Caregiver Present: No  Prior to Session Communication: Bedside nurse, PCT/REJI/TUAN  Patient Position Received: Bed, 3 rail up, Alarm off, caregiver present  General Comment: Pleasant and cooperative.    General Observations:   General Observation: Tele; On RA; No alarm(caregiver present).    Subjective     Precautions:  Precautions  Medical Precautions: Fall precautions    Vital Signs:  Vital Signs  Heart Rate:  (68-79bpm during session.)    Objective     Pain:  Pain  Assessment  Pain Assessment: 0-10  0-10 (Numeric) Pain Score: 0 - No pain    Cognition:  Cognition  Orientation Level: Disoriented to time, Disoriented to situation  Processing Speed: Delayed    Balance:   Static Sitting Balance  Static Sitting-Comment/Number of Minutes: G  Dynamic Sitting Balance  Dynamic Sitting-Comments: G-  Static Standing Balance  Static Standing-Comment/Number of Minutes: F with ww  Dynamic Standing Balance  Dynamic Standing-Comments: F- with ww    Treatments:  Therapeutic Exercise  Therapeutic Exercise Performed: Yes  Therapeutic Exercise Activity 1: Instructed patient in seated ther ex. AROM BLE with AP/LAQ/Hipflex/Abd-add(with resistance) x12. Verbal and visual cues to increase ROM with reps. No c/o with exercises. No rest breaks taken.    Bed Mobility  Bed Mobility: Yes  Bed Mobility 1  Bed Mobility 1: Supine to sitting  Level of Assistance 1: Minimum assistance  Bed Mobility Comments 1: HOB ~40°. Hand over hand (A) to reach across body to use the bed rail for support. (A) to lift UB from HOB while moving LEs over the EOB. Denies dizziness with positional change.  Ambulation/Gait Training  Ambulation/Gait Training Performed: Yes  Ambulation/Gait Training 1  Surface 1: Level tile  Device 1: Rolling walker  Gait Support Devices: Gait belt  Assistance 1: Minimum assistance  Comments/Distance (ft) 1: ~30ft x2. NBOS. Slow chet. Forward flexed posture with forward flexed head position; receptive to v/c for postural corrections. Step through gait pattern. Decreased step height/length B. v/c and (A) for safer maneuvering of ww with 90/180° turns and around obstacles in pathway. No c/o pain. Fatigue noted with increased activity.  Transfers  Transfer: Yes  Transfer 1  Technique 1: Sit to stand, Stand to sit  Transfer Device 1: Gait belt (ww)  Transfer Level of Assistance 1: Minimum assistance, Contact guard  Trials/Comments 1: (3x). Min(A) sit to stand and CGA stand to sit. v/c for safe hand  placement and technique. Slow transition of hands to/from ww. Benefits from elevated surfaces.             Outcome Measures:     [facility] Basic Mobility  Turning from your back to your side while in a flat bed without using bedrails: A little  Moving from lying on your back to sitting on the side of a flat bed without using bedrails: A lot  Moving to and from bed to chair (including a wheelchair): A little  Standing up from a chair using your arms (e.g. wheelchair or bedside chair): A little  To walk in hospital room: A little  Climbing 3-5 steps with railing: A lot  Basic Mobility - Total Score: 16                                      Education Documentation  Mobility Training, taught by Kelsi Mckeon PTA at 11/25/2024  1:34 PM.  Learner: Patient  Readiness: Acceptance  Method: Explanation, Demonstration, Teach-back  Response: Needs Reinforcement  Comment: See therapy note.           EDUCATION:  Individual(s) Educated: Patient  Education Provided: Body Mechanics, Fall Risk, Home Exercise Program, POC, Posture (Balance; Safety with bed mobility/transfers/amb.)  Patient Response to Education:  (Patient did not verbalize understanding of information, but was receptive to instructions/(A) to complete tasks.)    Encounter Problems       Encounter Problems (Active)       PT Problem       Pt will demonstrate mod A  with bed mobility to edge of bed.   (Progressing)       Start:  11/21/24    Expected End:  12/05/24            Pt will demonstrate mod A  with sit to stand/chair transfers with FWW.   (Progressing)       Start:  11/21/24    Expected End:  12/05/24            Pt will ambulate 20 feet with FWW mod A .   (Progressing)       Start:  11/21/24    Expected End:  12/05/24            Pt to demo improved BLE strength by being able to complete supine/seated thera ex 2x20 BLEs with 4 or less rest breaks .   (Progressing)       Start:  11/21/24    Expected End:  12/05/24

## 2024-11-25 NOTE — PROGRESS NOTES
Pt.  Will discharge this date to [facility] acute rehab at 1:30 pm via ambulance.  Pt. And spouse at bedside aware and in agreement to transfer.

## 2024-11-27 NOTE — DOCUMENTATION CLARIFICATION NOTE
"    PATIENT:               SAWYER LIN  ACCT #:                  [id number]  MRN:                       [id number]  :                       1955  ADMIT DATE:       2024 2:50 AM  DISCH DATE:        2024 3:00 PM  RESPONDING PROVIDER #:        [id number]          PROVIDER RESPONSE TEXT:    Gram negative pneumonia ruled out after workup CAP only    CDI QUERY TEXT:    Clarification        Instruction:    Based on your assessment of the patient and the clinical information, please provide the requested documentation by clicking on the appropriate radio button and enter any additional information if prompted.    Question: Please further clarify the diagnosis of CDI TO ENTER as    When answering this query, please exercise your independent professional judgment. The fact that a question is being asked, does not imply that any particular answer is desired or expected.    The patient's clinical indicators include:  Clinical Information: 70 yo male admitted with bacterial pneumonia    Clinical Indicators:   vital signs: T35.4 HR 94 R27 /78 94% RA  Streptococcus pneumoniae and L. Pneumophila Urine AG neg   chest xray: Retrocardiac airspace opacity which may represent atelectasis or  pneumonia/aspiration in the appropriate clinical setting.    Discharge summary:\" CAP, suspected gram negatives\"    Treatment:  Zosyn 3.375 gm IV once  Rocephin 1 gm IV  Q24hrs  -  Azithromycin 500 mg IV -    Risk Factors: Pneumonia , metabolic encephalopathy, YOLY, malnutrition  Options provided:  -- Gram negative pneumonia ruled out after workup CAP only  -- Gram negative pneumonia ruled in for this admission  -- Other - I will add my own diagnosis  -- Refer to Clinical Documentation Reviewer    Query created by: Hedy Johnson on 2024 1:18 PM      Electronically signed by:  SELENA GONSALEZ MD 2024 3:56 PM          "

## 2025-01-06 ENCOUNTER — HOSPITAL ENCOUNTER (EMERGENCY)
Facility: HOSPITAL | Age: 70
Discharge: HOME | End: 2025-01-06
Payer: OTHER GOVERNMENT

## 2025-01-06 VITALS
HEART RATE: 108 BPM | OXYGEN SATURATION: 96 % | RESPIRATION RATE: 16 BRPM | TEMPERATURE: 98.1 F | DIASTOLIC BLOOD PRESSURE: 104 MMHG | WEIGHT: 137 LBS | BODY MASS INDEX: 20.29 KG/M2 | SYSTOLIC BLOOD PRESSURE: 151 MMHG | HEIGHT: 69 IN

## 2025-01-06 DIAGNOSIS — T14.8XXA BLEEDING FROM WOUND: ICD-10-CM

## 2025-01-06 DIAGNOSIS — Z51.89 ENCOUNTER FOR WOUND RE-CHECK: Primary | ICD-10-CM

## 2025-01-06 PROCEDURE — 12001 RPR S/N/AX/GEN/TRNK 2.5CM/<: CPT | Performed by: PHYSICIAN ASSISTANT

## 2025-01-06 PROCEDURE — 2500000004 HC RX 250 GENERAL PHARMACY W/ HCPCS (ALT 636 FOR OP/ED): Performed by: PHYSICIAN ASSISTANT

## 2025-01-06 PROCEDURE — 99284 EMERGENCY DEPT VISIT MOD MDM: CPT

## 2025-01-06 RX ORDER — LIDOCAINE HYDROCHLORIDE AND EPINEPHRINE 10; 10 UG/ML; MG/ML
5 INJECTION, SOLUTION INFILTRATION; PERINEURAL ONCE
Status: COMPLETED | OUTPATIENT
Start: 2025-01-06 | End: 2025-01-06

## 2025-01-06 RX ADMIN — LIDOCAINE HYDROCHLORIDE,EPINEPHRINE BITARTRATE 5 ML: 10; .01 INJECTION, SOLUTION INFILTRATION; PERINEURAL at 20:01

## 2025-01-06 ASSESSMENT — LIFESTYLE VARIABLES
HAVE PEOPLE ANNOYED YOU BY CRITICIZING YOUR DRINKING: NO
EVER HAD A DRINK FIRST THING IN THE MORNING TO STEADY YOUR NERVES TO GET RID OF A HANGOVER: NO
HAVE YOU EVER FELT YOU SHOULD CUT DOWN ON YOUR DRINKING: NO
EVER FELT BAD OR GUILTY ABOUT YOUR DRINKING: NO
TOTAL SCORE: 0

## 2025-01-06 ASSESSMENT — PAIN DESCRIPTION - LOCATION: LOCATION: OTHER (COMMENT)

## 2025-01-06 ASSESSMENT — PAIN - FUNCTIONAL ASSESSMENT: PAIN_FUNCTIONAL_ASSESSMENT: 0-10

## 2025-01-06 ASSESSMENT — PAIN DESCRIPTION - PAIN TYPE: TYPE: ACUTE PAIN

## 2025-01-06 ASSESSMENT — PAIN DESCRIPTION - FREQUENCY: FREQUENCY: CONSTANT/CONTINUOUS

## 2025-01-06 ASSESSMENT — PAIN SCALES - GENERAL: PAINLEVEL_OUTOF10: 6

## 2025-01-06 ASSESSMENT — PAIN DESCRIPTION - DESCRIPTORS: DESCRIPTORS: PATIENT UNABLE TO DESCRIBE

## 2025-01-07 NOTE — ED PROVIDER NOTES
HPI   Chief Complaint   Patient presents with   • Post-op Problem     He had an abscess on the side of his neck drained, [facility], [facility] were [facility] times and it is still bleeding through his dressing - per ex-wife. Pt states he takes Pradaxa.       This is a 69-year-old male presents with complaint of bleeding wound on his neck after having an abscess incised and [facility] earlier today.  The wife states that when I discharged him there was still some bleeding coming from the incision.  He was advised to hold pressure.  The wife states when he got home the bleeding had continued so she brought back to the hospital 3 more times and each time I discharged back home.  The wife decided to bring him to the hospital for a second opinion.  He is on Pradaxa for his atrial fibrillation.  He denies any other complaints.      History provided by:  Patient, medical records and spouse          Patient History   No past medical history on file.  No past surgical history on file.  No family history on file.  Social History     Tobacco Use   • Smoking status: Unknown   • Smokeless tobacco: Not on file   Substance Use Topics   • Alcohol use: Yes     Alcohol/week: 12.0 standard drinks of alcohol     Types: 12 Cans of beer per week   • Drug use: Not Currently       Physical Exam   ED Triage Vitals [01/06/25 1934]   Temperature Heart Rate Respirations BP   36.5 °C (97.7 °F) (!) 118 16 179/69      Pulse Ox Temp Source Heart Rate Source Patient Position   98 % Temporal Monitor Sitting      BP Location FiO2 (%)     Right arm --       Physical Exam  Vitals and nursing note reviewed.   Constitutional:       Appearance: Normal appearance. He is normal weight.   HENT:      Head: Normocephalic and atraumatic.      Right Ear: Tympanic membrane, ear canal and external ear normal.      Left Ear: Tympanic membrane, ear canal and external ear normal.      Nose: Nose normal.      Mouth/Throat:       Mouth: Mucous membranes are moist.      Pharynx: Oropharynx is clear.   Eyes:      Extraocular Movements: Extraocular movements intact.      Conjunctiva/sclera: Conjunctivae normal.      Pupils: Pupils are equal, round, and reactive to light.   Neck:        Comments: There is a 1 cm stab incision posterior neck with some oozing.  Cardiovascular:      Rate and Rhythm: Regular rhythm. Tachycardia present.      Pulses: Normal pulses.      Heart sounds: Normal heart sounds.   Pulmonary:      Effort: Pulmonary effort is normal.      Breath sounds: Normal breath sounds. No wheezing, rhonchi or rales.   Abdominal:      General: Abdomen is flat. Bowel sounds are normal.      Palpations: Abdomen is soft. There is no mass.      Tenderness: There is no abdominal tenderness. There is no guarding.   Musculoskeletal:         General: No swelling. Normal range of motion.      Cervical back: Normal range of motion and neck supple. Tenderness present.   Skin:     General: Skin is warm and dry.      Capillary Refill: Capillary refill takes less than 2 seconds.      Findings: Laceration present. No rash.             Comments: 1 centimeter stab incision right-sided posterior neck from incision and drainage   Neurological:      General: No focal deficit present.      Mental Status: He is alert and oriented to person, place, and time. Mental status is at baseline.   Psychiatric:         Mood and Affect: Mood normal.         Behavior: Behavior normal.         Thought Content: Thought content normal.         Judgment: Judgment normal.           ED Course & MDM   Diagnoses as of 01/06/25 2127   Encounter for wound re-check   Bleeding from wound                 No data recorded     Muna Coma Scale Score: 15 (01/06/25 2005 : Lavonne Sidhu RN)                           Medical Decision Making  Temperature 36.5 heart rate 118 respirations 16 blood pressure 179/69 pulse ox is 98% on room air  I discussed the workup plan with the patient.  I  have decided to put 2 loose stitches in the open wound to help stop the bleeding.  Procedure note: See procedure for details.  We will observe the patient for the next hour to make sure the wound stays dry.    2120 hrs.: After watching the patient for over an hour the bleeding has completely resolved.  He was discharged and will follow-up with his doctor tomorrow for wound check.  He was advised to continue the antibiotics as prescribed, he was encouraged to return back to the ER with any concerns or worsening of symptoms.  Both the patient and family member verbalized understanding and agreement with the plan and disposition all questions answered prior to discharge        Procedure  Laceration Repair    Performed by: Bennett Carrasco PA-C  Authorized by: Bennett Carrasco PA-C    Consent:     Consent obtained:  Verbal    Consent given by:  Patient and spouse    Risks, benefits, and alternatives were discussed: yes      Risks discussed:  Infection, poor cosmetic result, poor wound healing, pain, need for additional repair, vascular damage and nerve damage    Alternatives discussed:  Referral  [place] protocol:     Procedure explained and questions answered to patient or proxy's satisfaction: yes      Patient identity confirmed:  Verbally with patient  Anesthesia:     Anesthesia method:  Local infiltration    Local anesthetic:  Lidocaine 1% WITH epi  Laceration details:     Location:  Neck    Neck location:  R posterior    Length (cm):  1  Pre-procedure details:     Preparation:  Patient was prepped and draped in usual sterile fashion  Exploration:     Hemostasis achieved with:  Epinephrine and direct pressure    Imaging outcome: foreign body not noted (The wound was thoroughly examined, no packing material was noted.  The wife thinks it fell out when she removed his dressing)      Wound exploration: wound explored through full range of motion and entire depth of wound visualized      Wound extent: no fascia  violation noted, no foreign bodies/material noted, no muscle damage noted, no nerve damage noted, no tendon damage noted and no vascular damage noted      Contaminated: no    Treatment:     Area cleansed with:  Chlorhexidine    Amount of cleaning:  Standard    Irrigation solution:  Sterile saline    Irrigation volume:  1 L    Irrigation method:  Syringe    Visualized foreign bodies/material removed: no    Skin repair:     Repair method:  Sutures    Suture size:  4-0    Suture material:  Nylon    Suture technique:  Simple interrupted    Number of sutures:  2  Approximation:     Approximation:  Loose  Repair type:     Repair type:  Simple  Post-procedure details:     Dressing:  Non-adherent dressing and adhesive bandage    Procedure completion:  Tolerated well, no immediate complications       Bennett Carrasco PA-C  01/06/25 [id number]

## 2025-01-08 ENCOUNTER — APPOINTMENT (OUTPATIENT)
Dept: CARDIOLOGY | Facility: CLINIC | Age: 70
End: 2025-01-08
Payer: OTHER GOVERNMENT

## 2025-01-13 ENCOUNTER — APPOINTMENT (OUTPATIENT)
Dept: CARDIOLOGY | Facility: CLINIC | Age: 70
End: 2025-01-13
Payer: OTHER GOVERNMENT

## 2025-02-27 ENCOUNTER — APPOINTMENT (OUTPATIENT)
Dept: RADIOLOGY | Facility: HOSPITAL | Age: 70
End: 2025-02-27
Payer: OTHER GOVERNMENT

## 2025-02-27 ENCOUNTER — APPOINTMENT (OUTPATIENT)
Dept: CARDIOLOGY | Facility: HOSPITAL | Age: 70
End: 2025-02-27
Payer: OTHER GOVERNMENT

## 2025-02-27 ENCOUNTER — HOSPITAL ENCOUNTER (OUTPATIENT)
Facility: HOSPITAL | Age: 70
Setting detail: OBSERVATION
Discharge: LEFT BEFORE DISCHARGE | End: 2025-02-27
Attending: EMERGENCY MEDICINE | Admitting: STUDENT IN AN ORGANIZED HEALTH CARE EDUCATION/TRAINING PROGRAM
Payer: OTHER GOVERNMENT

## 2025-02-27 VITALS
OXYGEN SATURATION: 96 % | DIASTOLIC BLOOD PRESSURE: 88 MMHG | SYSTOLIC BLOOD PRESSURE: 155 MMHG | TEMPERATURE: 97.7 F | BODY MASS INDEX: 22.96 KG/M2 | RESPIRATION RATE: 16 BRPM | HEART RATE: 85 BPM | WEIGHT: 155 LBS | HEIGHT: 69 IN

## 2025-02-27 DIAGNOSIS — I50.9 ACUTE ON CHRONIC CONGESTIVE HEART FAILURE, UNSPECIFIED HEART FAILURE TYPE: ICD-10-CM

## 2025-02-27 DIAGNOSIS — R07.9 CHEST PAIN, UNSPECIFIED TYPE: Primary | ICD-10-CM

## 2025-02-27 LAB
ALBUMIN SERPL BCP-MCNC: 4.7 G/DL (ref 3.4–5)
ALP SERPL-CCNC: 101 U/L (ref 33–136)
ALT SERPL W P-5'-P-CCNC: 12 U/L (ref 10–52)
ANION GAP SERPL CALC-SCNC: 14 MMOL/L (ref 10–20)
AST SERPL W P-5'-P-CCNC: 22 U/L (ref 9–39)
ATRIAL RATE: 56 BPM
BASOPHILS # BLD AUTO: 0.07 X10*3/UL (ref 0–0.1)
BASOPHILS NFR BLD AUTO: 0.6 %
BILIRUB SERPL-MCNC: 0.8 MG/DL (ref 0–1.2)
BNP SERPL-MCNC: 886 PG/ML (ref 0–99)
BUN SERPL-MCNC: 18 MG/DL (ref 6–23)
CALCIUM SERPL-MCNC: 10.4 MG/DL (ref 8.6–10.3)
CARDIAC TROPONIN I PNL SERPL HS: 10 NG/L (ref 0–20)
CARDIAC TROPONIN I PNL SERPL HS: 12 NG/L (ref 0–20)
CHLORIDE SERPL-SCNC: 98 MMOL/L (ref 98–107)
CO2 SERPL-SCNC: 28 MMOL/L (ref 21–32)
CREAT SERPL-MCNC: 1.46 MG/DL (ref 0.5–1.3)
EGFRCR SERPLBLD CKD-EPI 2021: 51 ML/MIN/1.73M*2
EOSINOPHIL # BLD AUTO: 0.39 X10*3/UL (ref 0–0.4)
EOSINOPHIL NFR BLD AUTO: 3.6 %
ERYTHROCYTE [DISTWIDTH] IN BLOOD BY AUTOMATED COUNT: 15.3 % (ref 11.5–14.5)
GLUCOSE SERPL-MCNC: 113 MG/DL (ref 74–99)
HCT VFR BLD AUTO: 44.6 % (ref 41–52)
HGB BLD-MCNC: 13.7 G/DL (ref 13.5–17.5)
IMM GRANULOCYTES # BLD AUTO: 0.05 X10*3/UL (ref 0–0.5)
IMM GRANULOCYTES NFR BLD AUTO: 0.5 % (ref 0–0.9)
INR PPP: 1.4 (ref 0.9–1.1)
LACTATE SERPL-SCNC: 1.8 MMOL/L (ref 0.4–2)
LYMPHOCYTES # BLD AUTO: 1.74 X10*3/UL (ref 0.8–3)
LYMPHOCYTES NFR BLD AUTO: 16 %
MAGNESIUM SERPL-MCNC: 2.01 MG/DL (ref 1.6–2.4)
MCH RBC QN AUTO: 29.3 PG (ref 26–34)
MCHC RBC AUTO-ENTMCNC: 30.7 G/DL (ref 32–36)
MCV RBC AUTO: 95 FL (ref 80–100)
MONOCYTES # BLD AUTO: 0.95 X10*3/UL (ref 0.05–0.8)
MONOCYTES NFR BLD AUTO: 8.7 %
NEUTROPHILS # BLD AUTO: 7.68 X10*3/UL (ref 1.6–5.5)
NEUTROPHILS NFR BLD AUTO: 70.6 %
NRBC BLD-RTO: 0 /100 WBCS (ref 0–0)
PLATELET # BLD AUTO: 462 X10*3/UL (ref 150–450)
POTASSIUM SERPL-SCNC: 3.6 MMOL/L (ref 3.5–5.3)
PROT SERPL-MCNC: 9.6 G/DL (ref 6.4–8.2)
PROTHROMBIN TIME: 15.7 SECONDS (ref 9.8–12.4)
Q ONSET: 226 MS
QRS COUNT: 17 BEATS
QRS DURATION: 80 MS
QT INTERVAL: 362 MS
QTC CALCULATION(BAZETT): 476 MS
QTC FREDERICIA: 435 MS
R AXIS: 61 DEGREES
RBC # BLD AUTO: 4.68 X10*6/UL (ref 4.5–5.9)
SODIUM SERPL-SCNC: 136 MMOL/L (ref 136–145)
T AXIS: 62 DEGREES
T OFFSET: 407 MS
VENTRICULAR RATE: 104 BPM
WBC # BLD AUTO: 10.9 X10*3/UL (ref 4.4–11.3)

## 2025-02-27 PROCEDURE — 93005 ELECTROCARDIOGRAM TRACING: CPT

## 2025-02-27 PROCEDURE — 36415 COLL VENOUS BLD VENIPUNCTURE: CPT | Performed by: EMERGENCY MEDICINE

## 2025-02-27 PROCEDURE — 85610 PROTHROMBIN TIME: CPT | Performed by: EMERGENCY MEDICINE

## 2025-02-27 PROCEDURE — 83880 ASSAY OF NATRIURETIC PEPTIDE: CPT | Performed by: EMERGENCY MEDICINE

## 2025-02-27 PROCEDURE — 2500000001 HC RX 250 WO HCPCS SELF ADMINISTERED DRUGS (ALT 637 FOR MEDICARE OP): Performed by: EMERGENCY MEDICINE

## 2025-02-27 PROCEDURE — 80053 COMPREHEN METABOLIC PANEL: CPT | Performed by: EMERGENCY MEDICINE

## 2025-02-27 PROCEDURE — 84075 ASSAY ALKALINE PHOSPHATASE: CPT | Performed by: EMERGENCY MEDICINE

## 2025-02-27 PROCEDURE — 83605 ASSAY OF LACTIC ACID: CPT | Performed by: EMERGENCY MEDICINE

## 2025-02-27 PROCEDURE — 2500000004 HC RX 250 GENERAL PHARMACY W/ HCPCS (ALT 636 FOR OP/ED): Performed by: STUDENT IN AN ORGANIZED HEALTH CARE EDUCATION/TRAINING PROGRAM

## 2025-02-27 PROCEDURE — G0378 HOSPITAL OBSERVATION PER HR: HCPCS

## 2025-02-27 PROCEDURE — 83735 ASSAY OF MAGNESIUM: CPT | Performed by: EMERGENCY MEDICINE

## 2025-02-27 PROCEDURE — 71275 CT ANGIOGRAPHY CHEST: CPT | Performed by: RADIOLOGY

## 2025-02-27 PROCEDURE — 96376 TX/PRO/DX INJ SAME DRUG ADON: CPT

## 2025-02-27 PROCEDURE — 84484 ASSAY OF TROPONIN QUANT: CPT | Performed by: EMERGENCY MEDICINE

## 2025-02-27 PROCEDURE — 85025 COMPLETE CBC W/AUTO DIFF WBC: CPT | Performed by: EMERGENCY MEDICINE

## 2025-02-27 PROCEDURE — 2500000004 HC RX 250 GENERAL PHARMACY W/ HCPCS (ALT 636 FOR OP/ED): Performed by: EMERGENCY MEDICINE

## 2025-02-27 PROCEDURE — 2500000005 HC RX 250 GENERAL PHARMACY W/O HCPCS: Performed by: EMERGENCY MEDICINE

## 2025-02-27 PROCEDURE — 71275 CT ANGIOGRAPHY CHEST: CPT

## 2025-02-27 PROCEDURE — 2550000001 HC RX 255 CONTRASTS: Performed by: EMERGENCY MEDICINE

## 2025-02-27 PROCEDURE — 71045 X-RAY EXAM CHEST 1 VIEW: CPT

## 2025-02-27 PROCEDURE — 96374 THER/PROPH/DIAG INJ IV PUSH: CPT | Mod: 59

## 2025-02-27 PROCEDURE — 99235 HOSP IP/OBS SAME DATE MOD 70: CPT | Performed by: STUDENT IN AN ORGANIZED HEALTH CARE EDUCATION/TRAINING PROGRAM

## 2025-02-27 PROCEDURE — 71045 X-RAY EXAM CHEST 1 VIEW: CPT | Performed by: RADIOLOGY

## 2025-02-27 PROCEDURE — 99291 CRITICAL CARE FIRST HOUR: CPT | Performed by: EMERGENCY MEDICINE

## 2025-02-27 RX ORDER — NITROGLYCERIN 0.4 MG/1
0.4 TABLET SUBLINGUAL ONCE
Status: COMPLETED | OUTPATIENT
Start: 2025-02-27 | End: 2025-02-27

## 2025-02-27 RX ORDER — FUROSEMIDE 10 MG/ML
40 INJECTION INTRAMUSCULAR; INTRAVENOUS ONCE
Status: COMPLETED | OUTPATIENT
Start: 2025-02-27 | End: 2025-02-27

## 2025-02-27 RX ADMIN — NITROGLYCERIN 0.4 MG: 0.4 TABLET SUBLINGUAL at 11:48

## 2025-02-27 RX ADMIN — NITROGLYCERIN 0.5 INCH: 20 OINTMENT TOPICAL at 15:06

## 2025-02-27 RX ADMIN — FUROSEMIDE 40 MG: 10 INJECTION, SOLUTION INTRAMUSCULAR; INTRAVENOUS at 17:52

## 2025-02-27 RX ADMIN — FUROSEMIDE 40 MG: 10 INJECTION, SOLUTION INTRAMUSCULAR; INTRAVENOUS at 13:47

## 2025-02-27 RX ADMIN — IOHEXOL 75 ML: 350 INJECTION, SOLUTION INTRAVENOUS at 12:56

## 2025-02-27 RX ADMIN — Medication 2 L/MIN: at 13:08

## 2025-02-27 ASSESSMENT — LIFESTYLE VARIABLES
HAVE YOU EVER FELT YOU SHOULD CUT DOWN ON YOUR DRINKING: NO
EVER FELT BAD OR GUILTY ABOUT YOUR DRINKING: NO
TOTAL SCORE: 0
HAVE PEOPLE ANNOYED YOU BY CRITICIZING YOUR DRINKING: NO
EVER HAD A DRINK FIRST THING IN THE MORNING TO STEADY YOUR NERVES TO GET RID OF A HANGOVER: NO

## 2025-02-27 ASSESSMENT — COLUMBIA-SUICIDE SEVERITY RATING SCALE - C-SSRS
1. IN THE PAST MONTH, HAVE YOU WISHED YOU WERE DEAD OR WISHED YOU COULD GO TO SLEEP AND NOT WAKE UP?: NO
6. HAVE YOU EVER DONE ANYTHING, STARTED TO DO ANYTHING, OR PREPARED TO DO ANYTHING TO END YOUR LIFE?: NO
2. HAVE YOU ACTUALLY HAD ANY THOUGHTS OF KILLING YOURSELF?: NO

## 2025-02-27 ASSESSMENT — PAIN SCALES - GENERAL
PAINLEVEL_OUTOF10: 0 - NO PAIN
PAINLEVEL_OUTOF10: 8
PAINLEVEL_OUTOF10: 0 - NO PAIN

## 2025-02-27 ASSESSMENT — PAIN - FUNCTIONAL ASSESSMENT: PAIN_FUNCTIONAL_ASSESSMENT: 0-10

## 2025-02-27 NOTE — ED PROCEDURE NOTE
Procedure  Critical Care    Performed by: Ariadna Mayo MD  Authorized by: Ariadna Mayo MD    Critical care provider statement:     Critical care time (minutes):  35    Critical care time was exclusive of:  Separately billable procedures and treating other patients    Critical care was necessary to treat or prevent imminent or life-threatening deterioration of the following conditions:  Cardiac failure and respiratory failure    Critical care was time spent personally by me on the following activities:  Ordering and performing treatments and interventions, ordering and review of laboratory studies, ordering and review of radiographic studies, pulse oximetry, re-evaluation of patient's condition, examination of patient and evaluation of patient's response to treatment    Care discussed with: admitting provider                 Ariadna Mayo MD  02/27/25 [id number]

## 2025-02-27 NOTE — ED PROVIDER NOTES
HPI   Chief Complaint   Patient presents with    Chest Pain     Chest pain that started today       HPI: 69-year-old male history of coronary artery disease status post remote CABG, A-fib on Pradaxa presents with chest pain.  Most of the history is from his family member as the patient states that he got the beginnings of dementia.  According to the family they were [facility] way [facility] when he developed chest pain in the car.  It was substernal nonradiating.  It subsided on its own.  He is not having any current chest pain.  She states that she was just concerned so she brought him here.  She states that she did not want to take them all the way back to [facility].  No swelling in his legs.  No abdominal pain.  Patient does have an elevated blood pressure.  She states that his blood pressure is not usually that high.  No blood in his urine blood in his stools or dark tarry stools.    Family HX: Denies any significant/pertinent family history.  Social Hx: Denies ETOH or drug use.  Review of systems:  Gen.: No weight loss, fatigue, anorexia, insomnia, fever.   Eyes: No vision loss, double vision, drainage, eye pain.   ENT: No pharyngitis, dry mouth.   Cardiac: No  palpitations, syncope, near syncope.   Pulmonary: No shortness of breath, cough, hemoptysis.   Heme/lymph: No swollen glands, fever, bleeding.   GI: No abdominal pain, change in bowel habits, melena, hematemesis, hematochezia, nausea, vomiting, diarrhea.   : No discharge, dysuria, frequency, urgency, hematuria.   Musculoskeletal: No limb pain, joint pain, joint swelling.   Skin: No rashes.   Psych: No depression, anxiety, suicidality, homicidality.   Review of systems is otherwise negative unless stated above or in history of present illness.    Physical Exam:    Appearance: Alert, oriented , cooperative,  in no acute distress. Well nourished & well hydrated.    Skin: Intact,  dry skin, no lesions, rash, petechiae or purpura.      Eyes: PERRLA, EOMs intact,  Conjunctiva pink with no redness or exudates. Eyelids without lesions. No scleral icterus.     ENT: Hearing grossly intact. External auditory canals patent, tympanic membranes intact with visible landmarks. Nares patent, mucus membranes moist. Dentition without lesions. Pharynx clear, uvula midline.     Neck: Supple, without meningismus. Thyroid not palpable. Trachea at midline. No lymphadenopathy.    Pulmonary: DiminishesNo accessory muscle use or stridor.    Cardiac: Irregularly irregular without murmur, rub, gallop or extrasystole. No JVD, Carotids without bruits.    Abdomen: Soft, nontender, active bowel sounds.  No palpable organomegaly.  No rebound or guarding.  No CVA tenderness.    Genitourinary: Exam deferred.    Musculoskeletal: Full range of motion. no pain, edema, or deformity. Pulses full and equal. No cyanosis, clubbing, or edema.    Neurological:  Cranial nerves II through XII are grossly intact, finger-nose touch is normal, normal sensation, no weakness, no focal findings identified.    Psychiatric: Appropriate mood and affect.     Medical Decision-Making:  Testing: EKG was obtained which is interpreted by me shows A-fib rate of 104 nonspecific ST-T wave changes but no evidence of obvious ST elevations or T wave inversions indicate acute ischemia or infarct.  Repeat EKG was obtained which is interpreted by me shows A-fib rate of 88 without evidence of obvious ST elevations or T wave inversions to indicate acute ischemia or infarct.  Patient was found to be hypoxic therefore he was placed on 3 L.  This has improved his oxygen saturation as well as his work of breathing.  Chest x-ray was concerning for possible DHF as interpreted by me.  Troponins are negative x 2.  Patient had a CT of the chest done to rule out a PE for the chest pain and shortness of breath which shows no evidence of pulmonary embolism enlargement of the central pulmonary arterial branches suggest  pulmonary hypertension scattered small bulla diffuse interstitial prominence.  Patient was therefore given Lasix as he also has an elevated BNP which does support the concern for CHF.  Given that the patient is having chest pain as well as borderline oxygen saturations without oxygen I did recommend admission.  Patient was accepted by the hospitalist.  On reevaluation he is improved.  He has been able to diurese here in the emergency department as well.  I did give him 1 nitroglycerin as well as his blood pressure was initially elevated.  This did help his blood pressure.  Therefore given that he has CHF as well as chest pain and hypertension I will place Nitropaste on as well to help decrease preload  Treatment:   Reevaluation:   Plan: AdmitPatient and family/friend/caregiver are in agreement with this plan.   Impression:   1. Chest pain  2.chf  3, hypoxemia  Labs Reviewed  CBC WITH AUTO DIFFERENTIAL - Abnormal     WBC                           10.9                   nRBC                          0.0                    RBC                           4.68                   Hemoglobin                    13.7                   Hematocrit                    44.6                   MCV                           95                     MCH                           29.3                   MCHC                          30.7 (*)               RDW                           15.3 (*)               Platelets                     462 (*)                Neutrophils %                 70.6                   Immature Granulocytes %, Automated   0.5                    Lymphocytes %                 16.0                   Monocytes %                   8.7                    Eosinophils %                 3.6                    Basophils %                   0.6                    Neutrophils Absolute          7.68 (*)               Immature Granulocytes Absolute, Au*   0.05                   Lymphocytes Absolute          1.74                    Monocytes Absolute            0.95 (*)               Eosinophils Absolute          0.39                   Basophils Absolute            0.07                COMPREHENSIVE METABOLIC PANEL - Abnormal     Glucose                       113 (*)                Sodium                        136                    Potassium                     3.6                    Chloride                      98                     Bicarbonate                   28                     Anion Gap                     14                     Urea Nitrogen                 18                     Creatinine                    1.46 (*)               eGFR                          51 (*)                 Calcium                       10.4 (*)               Albumin                       4.7                    Alkaline Phosphatase          101                    Total Protein                 9.6 (*)                AST                           22                     Bilirubin, Total              0.8                    ALT                           12                  PROTIME-INR - Abnormal     Protime                       15.7 (*)               INR                           1.4 (*)             B-TYPE NATRIURETIC PEPTIDE - Abnormal     BNP                           886 (*)                  Narrative:    <100 pg/mL - Heart failure unlikely                100-299 pg/mL - Intermediate probability of acute heart                                failure exacerbation. Correlate with clinical                                context and patient history.                  >=300 pg/mL - Heart Failure likely. Correlate with clinical                                context and patient history.                                BNP testing is performed using different testing methodology at [facility] than at other [facility] hospitals. Direct result comparisons should only be made within the same method.                   MAGNESIUM - Normal     Magnesium                      2.01                LACTATE - Normal     Lactate                       1.8                      Narrative: Venipuncture immediately after or during the administration of Metamizole may lead to falsely low results. Testing should be performed immediately prior to Metamizole dosing.  SERIAL TROPONIN-INITIAL - Normal     Troponin I, High Sensitivity   12                       Narrative: Less than 99th percentile of normal range cutoff-                Female and children under 18 years old <14 ng/L; Male <21 ng/L: Negative                Repeat testing should be performed if clinically indicated.                                 Female and children under 18 years old 14-50 ng/L; Male 21-50 ng/L:                Consistent with possible cardiac damage and possible increased clinical                 risk. Serial measurements may help to assess extent of myocardial damage.                                 >50 ng/L: Consistent with cardiac damage, increased clinical risk and                myocardial infarction. Serial measurements may help assess extent of                 myocardial damage.                                  NOTE: Children less than 1 year old may have higher baseline troponin                 levels and results should be interpreted in conjunction with the overall                 clinical context.                                 NOTE: Troponin I testing is performed using a different                 testing methodology at [facility] than at other                 [facility]. Direct result comparisons should only                 be made within the same method.  SERIAL TROPONIN, 1 HOUR - Normal     Troponin I, High Sensitivity   10                       Narrative: Less than 99th percentile of normal range cutoff-                Female and children under 18 years old <14 ng/L; Male <21 ng/L: Negative                Repeat testing should be performed if clinically indicated.                                  Female and children under 18 years old 14-50 ng/L; Male 21-50 ng/L:                Consistent with possible cardiac damage and possible increased clinical                 risk. Serial measurements may help to assess extent of myocardial damage.                                 >50 ng/L: Consistent with cardiac damage, increased clinical risk and                myocardial infarction. Serial measurements may help assess extent of                 myocardial damage.                                  NOTE: Children less than 1 year old may have higher baseline troponin                 levels and results should be interpreted in conjunction with the overall                 clinical context.                                 NOTE: Troponin I testing is performed using a different                 testing methodology at [facility] than at other                 [facility]. Direct result comparisons should only                 be made within the same method.  TROPONIN SERIES- (INITIAL, 1 HR)     CT angio chest for pulmonary embolism   Final Result    1.  No evidence of pulmonary embolism.    2. Enlargement of central pulmonary arterial branches suggest    pulmonary hypertension.    3. Scattered small bulla throughout the lungs.    4. Diffuse interstitial prominence may represent vascular congestion    and/or atypical infection.    5. Multifocal irregular predominantly peripheral atelectasis/scarring    with slightly more focal region of consolidation or volume loss in    the left lower lobe posterior basilar aspect.                      MACRO:    None.          Signed by: Boris Joseph 2/27/2025 1:08 PM    Dictation workstation:   [id number]     XR chest 1 view   Final Result    1.  Bilateral irregular interstitial thickening may represent chronic    thickening, interstitial edema and/or atypical infection.    2. Probable small volume left pleural effusion.    3. Left basilar linear  atelectasis or scar.                      MACRO:    None.          Signed by: Boris Joseph 2/27/2025 11:50 AM    Dictation workstation:   [id number]                      Patient History   No past medical history on file.  No past surgical history on file.  No family history on file.  Social History     Tobacco Use    Smoking status: Unknown    Smokeless tobacco: Not on file   Substance Use Topics    Alcohol use: Yes     Alcohol/week: 12.0 standard drinks of alcohol     Types: 12 Cans of beer per week    Drug use: Not Currently       Physical Exam   ED Triage Vitals [02/27/25 1120]   Temperature Heart Rate Respirations BP   36.5 °C (97.7 °F) 77 20 (!) 192/102      Pulse Ox Temp src Heart Rate Source Patient Position   100 % -- -- --      BP Location FiO2 (%)     -- --       Physical Exam      ED Course & MDM   Diagnoses as of 02/27/25 1414   Chest pain, unspecified type   Acute on chronic congestive heart failure, unspecified heart failure type                 No data recorded                                 Medical Decision Making      Procedure  Procedures     Ariadna Mayo MD  02/27/25 1414

## 2025-02-27 NOTE — H&P
Medical Group History and Physical  ASSESSMENT & PLAN:     1.  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  Chest pain  3.  Severe protein calorie malnutrition  4.  Acute respiratory failure with hypoxia  5.  Paroxysmal atrial fibrillation  6.  Long-term anticoagulation use    Plan:  Plan was to admit patient for diuresis and wean off oxygen over the next 24 hours.    However was notified an hour or so after my encounter that patient's family is electing to leave AGAINST MEDICAL ADVICE, they do not want to wait in the ED for a bed at this time.    See my significant event note for more details.      ---Of note, this documentation is completed using the Dragon Dictation system (voice recognition software). There may be spelling and/or grammatical errors that were not corrected prior to final submission.---    Kevin Brown MD    HISTORY OF PRESENT ILLNESS:   Chief Complaint: SOB, CP    History Of Present Illness:    Ivan Bravo is a 69 y.o. male with a significant past medical history of atrial fibrillation, long-term anticoagulation use, lung nodules that presented from home with sudden onset of shortness of breath and chest pain.  [facility] in the [facility] and on the way home patient's wife states that symptoms began.    Symptoms have improved since arrival to ED.  Has been diuresing well with Lasix given to them.  Does not wear oxygen at baseline.  Patient's wife states that they have been trying to switch [facility] as they are not pleased with the current care they are receiving.    ED course:  Vitals: Temperature 97.7, HR 77, /102, RR 20 saturating 100% on room air  Labs: Unremarkable CBC and CMP.  Imaging: CTA chest with concerns for pulm hypertension, vascular congestion however no PE.  Chest x-ray also reviewed.     Review of systems: 10 point review of systems is otherwise negative except as mentioned  above.    PAST HISTORIES:     Past Medical History:  He has no past medical history on file.    Past Surgical History:  He has no past surgical history on file.      Social History:  He reports current alcohol use of about 12.0 standard drinks of alcohol per week. He reports that he does not currently use drugs. No history on file for tobacco use.    Family History:  No family history on file.     Allergies:  Oxycodone, Simvastatin, and Tamsulosin    OBJECTIVE:     Last Recorded Vitals:  Vitals:    02/27/25 1400 02/27/25 1500 02/27/25 1506 02/27/25 1615   BP:  (!) 145/92 (!) 145/92 (!) 168/93   BP Location:    Right arm   Patient Position:    Lying   Pulse: 86 84 87 100   Resp:   16 16   Temp:       SpO2: 99% 97% 96% 95%   Weight:       Height:         Last I/O:  No intake/output data recorded.    Physical Exam  Vitals reviewed.   Constitutional:       General: He is not in acute distress.     Appearance: He is not ill-appearing.      Comments: Frail in appearance   HENT:      Head: Normocephalic and atraumatic.      Nose: Nose normal.      Mouth/Throat:      Mouth: Mucous membranes are moist.      Pharynx: Oropharynx is clear.   Eyes:      Extraocular Movements: Extraocular movements intact.      Conjunctiva/sclera: Conjunctivae normal.   Cardiovascular:      Rate and Rhythm: Normal rate and regular rhythm.      Pulses: Normal pulses.      Heart sounds: Normal heart sounds.   Pulmonary:      Effort: Pulmonary effort is normal.      Comments: Breath sounds are diminished to auscultation bilaterally.  Abdominal:      General: Abdomen is flat. Bowel sounds are normal. There is no distension.      Palpations: Abdomen is soft.      Tenderness: There is no abdominal tenderness. There is no guarding.   Musculoskeletal:         General: No swelling or tenderness. Normal range of motion.      Cervical back: Normal range of motion and neck supple.   Neurological:      General: No focal deficit present.      Mental Status: He  is alert and oriented to person, place, and time. Mental status is at baseline.   Psychiatric:         Mood and Affect: Mood normal.         Behavior: Behavior normal.       Scheduled Medications  furosemide, 40 mg, intravenous, Once  oxygen, , inhalation, Continuous - Inhalation      PRN Medications    Continuous Medications       Outpatient Medications:  Prior to Admission medications    Medication Sig Start Date End Date Taking? Authorizing Provider   cyclobenzaprine (Flexeril) 10 mg tablet Take 1 tablet (10 mg) by mouth 3 times a day as needed. 6/21/24   Historical Provider, MD   dabigatran etexilate (Pradaxa) 150 mg capsule Take 1 capsule (150 mg) by mouth. 6/26/24   Historical Provider, MD   gabapentin (Neurontin) 400 mg capsule Take 1 capsule (400 mg) by mouth. 2/14/24   Historical Provider, MD   metoprolol tartrate (Lopressor) 75 mg tablet Take 1 tablet (75 mg) by mouth 2 times a day. 11/25/24   Alvarado Ryan MD   multivitamin with minerals tablet Take 1 tablet by mouth once daily. 11/26/24   Alvarado Ryan MD   thiamine (Vitamin B-1) 100 mg tablet Take 1 tablet (100 mg) by mouth once daily. 11/26/24   Alvarado Ryan MD       LABS AND IMAGING:     Labs:  Results from last 7 days   Lab Units 02/27/25  1128   WBC AUTO x10*3/uL 10.9   RBC AUTO x10*6/uL 4.68   HEMOGLOBIN g/dL 13.7   HEMATOCRIT % 44.6   MCV fL 95   MCH pg 29.3   MCHC g/dL 30.7*   RDW % 15.3*   PLATELETS AUTO x10*3/uL 462*     Results from last 7 days   Lab Units 02/27/25  1128   SODIUM mmol/L 136   POTASSIUM mmol/L 3.6   CHLORIDE mmol/L 98   CO2 mmol/L 28   BUN mg/dL 18   CREATININE mg/dL 1.46*   GLUCOSE mg/dL 113*   PROTEIN TOTAL g/dL 9.6*   CALCIUM mg/dL 10.4*   BILIRUBIN TOTAL mg/dL 0.8   ALK PHOS U/L 101   AST U/L 22   ALT U/L 12     Results from last 7 days   Lab Units 02/27/25  1128   MAGNESIUM mg/dL 2.01     Results from last 7 days   Lab Units 02/27/25  1219 02/27/25  1128   TROPHS ng/L 10 12       Imaging:  ECG 12 lead  Atrial  fibrillation  Septal infarct (cited on or before 09-MAY-2020)  Abnormal ECG  When compared with ECG of 27-FEB-2025 11:13,  No significant change was found  CT angio chest for pulmonary embolism  Narrative: Interpreted By:  Boris Joseph,   STUDY:  CT ANGIO CHEST FOR PULMONARY EMBOLISM;  2/27/2025 1:00 pm      INDICATION:  Signs/Symptoms:chest pain, sob.          COMPARISON:  None.      ACCESSION NUMBER(S):  [id number]      ORDERING CLINICIAN:  JATIN BURROUGHS      TECHNIQUE:  Helical data acquisition of the chest was obtained following  intravenous administration of  75 mL Omnipaque 350 contrast. Images  were reformatted in axial, coronal, and sagittal planes.   MIP images  were created and reviewed.      FINDINGS:  POTENTIAL LIMITATIONS OF THE STUDY:  None      HEART AND VESSELS:  There is no evidence of pulmonary embolism. Enlargement of the  central pulmonary arterial vascular branches suggest pulmonary  hypertension.      Irregular atherosclerotic calcifications are present throughout the  aorta and branch vessels. No thoracic aortic aneurysm. Thoracic aorta  is minimally opacified due to timing of the contrast bolus.      Heart is enlarged. No pericardial effusion.      MEDIASTINUM AND EVE, LOWER NECK AND AXILLA:  Multi station mediastinal small nonspecific lymph nodes are seen.  Small bilateral hilar lymph nodes are also present. No axillary  lymphadenopathy.      LUNGS AND AIRWAYS:  Scattered small bulla are present throughout the lungs. There is mild  diffuse interstitial prominence. Multifocal irregular predominantly  peripheral regions of atelectasis/scarring are present. Slightly more  focal peripheral region of consolidation or volume loss is seen in  the left lower lobe posteriorly toward the base.      No pleural effusion or pneumothorax.      UPPER ABDOMEN:  Small granulomatous calcifications are noted in the spleen.      CHEST WALL AND OSSEOUS STRUCTURES:  Thoracic mild S-shaped scoliosis is  present. There is mild increased  thoracic kyphosis. Multilevel disc space narrowing and predominantly  anterior endplate spurring is greatest in the mid-lower thoracic  spine. Median sternotomy wires are present.      Impression: 1.  No evidence of pulmonary embolism.  2. Enlargement of central pulmonary arterial branches suggest  pulmonary hypertension.  3. Scattered small bulla throughout the lungs.  4. Diffuse interstitial prominence may represent vascular congestion  and/or atypical infection.  5. Multifocal irregular predominantly peripheral atelectasis/scarring  with slightly more focal region of consolidation or volume loss in  the left lower lobe posterior basilar aspect.              MACRO:  None.      Signed by: Boris Joseph 2/27/2025 1:08 PM  Dictation workstation:   [id number]  ECG 12 lead  Atrial fibrillation with rapid ventricular response  Septal infarct (cited on or before 09-MAY-2020)  Abnormal ECG  When compared with ECG of 23-NOV-2024 06:59,  ST now depressed in Inferior leads  See ED provider note for full interpretation and clinical correlation  Confirmed by Edwin Lai ([id number]) on 2/27/2025 12:26:01 PM  XR chest 1 view  Narrative: Interpreted By:  Boris Joseph,   STUDY:  XR CHEST 1 VIEW;  2/27/2025 11:48 am      INDICATION:  Signs/Symptoms:Chest Pain.          COMPARISON:  11/19/2024.      ACCESSION NUMBER(S):  [id number]      ORDERING CLINICIAN:  JATIN BURROUGHS      FINDINGS:  CARDIOMEDIASTINAL SILHOUETTE:  Cardiomegaly, aortic calcifications and postoperative changes of the  mediastinum are again seen.      LUNGS:  Irregular interstitial thickening is seen bilaterally. Left basilar  linear atelectasis or scarring is present. Blunting of the left  costophrenic angle suggests small pleural effusion. No appreciable  pneumothorax.      ABDOMEN:  No remarkable upper abdominal findings.      BONES:  Thoracic mild dextrocurvature may be partially exaggerated by  positioning.       Impression: 1.  Bilateral irregular interstitial thickening may represent chronic  thickening, interstitial edema and/or atypical infection.  2. Probable small volume left pleural effusion.  3. Left basilar linear atelectasis or scar.              MACRO:  None.      Signed by: Boris Joseph 2/27/2025 11:50 AM  Dictation workstation:   [id number]

## 2025-02-27 NOTE — SIGNIFICANT EVENT
Was notified by nursing staff that patient and his wife have elected to leave AGAINST MEDICAL ADVICE.    On my encounter once again, patient's wife states that the ER is to fall and they do not want to wait all night for a bed in the ED.  I discussed with them that they should [facility] or come back to the ED if anything changes.  I will provide another dose of IV Lasix prior to him leaving the ER.    Spoke with patient's bedside nurse as well.  Patient will leave AGAINST MEDICAL ADVICE at this time.  Patient is of clear mind and able to make this decision, patient's wife is also of clear mind and able to make that decision.    Kevin Brown MD

## 2025-02-28 LAB
ATRIAL RATE: 64 BPM
Q ONSET: 224 MS
QRS COUNT: 14 BEATS
QRS DURATION: 86 MS
QT INTERVAL: 408 MS
QTC CALCULATION(BAZETT): 493 MS
QTC FREDERICIA: 463 MS
R AXIS: 43 DEGREES
T AXIS: 59 DEGREES
T OFFSET: 428 MS
VENTRICULAR RATE: 88 BPM

## 2025-06-10 ENCOUNTER — APPOINTMENT (OUTPATIENT)
Dept: RADIOLOGY | Facility: HOSPITAL | Age: 70
DRG: 871 | End: 2025-06-10
Payer: MEDICARE

## 2025-06-10 ENCOUNTER — APPOINTMENT (OUTPATIENT)
Dept: CARDIOLOGY | Facility: HOSPITAL | Age: 70
DRG: 871 | End: 2025-06-10
Payer: MEDICARE

## 2025-06-10 ENCOUNTER — HOSPITAL ENCOUNTER (INPATIENT)
Facility: HOSPITAL | Age: 70
LOS: 1 days | Discharge: LEFT BEFORE DISCHARGE | DRG: 871 | End: 2025-06-10
Attending: EMERGENCY MEDICINE | Admitting: INTERNAL MEDICINE
Payer: MEDICARE

## 2025-06-10 VITALS
BODY MASS INDEX: 22.96 KG/M2 | DIASTOLIC BLOOD PRESSURE: 80 MMHG | HEIGHT: 69 IN | SYSTOLIC BLOOD PRESSURE: 117 MMHG | RESPIRATION RATE: 20 BRPM | HEART RATE: 86 BPM | TEMPERATURE: 97.3 F | OXYGEN SATURATION: 97 % | WEIGHT: 155 LBS

## 2025-06-10 DIAGNOSIS — S09.90XA CLOSED HEAD INJURY, INITIAL ENCOUNTER: ICD-10-CM

## 2025-06-10 DIAGNOSIS — R53.1 GENERALIZED WEAKNESS: ICD-10-CM

## 2025-06-10 DIAGNOSIS — F03.90 DEMENTIA, UNSPECIFIED DEMENTIA SEVERITY, UNSPECIFIED DEMENTIA TYPE, UNSPECIFIED WHETHER BEHAVIORAL, PSYCHOTIC, OR MOOD DISTURBANCE OR ANXIETY (MULTI): ICD-10-CM

## 2025-06-10 DIAGNOSIS — N17.9 ACUTE RENAL INJURY: ICD-10-CM
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
## 2025-06-10 DIAGNOSIS — A41.9 SEPSIS, DUE TO UNSPECIFIED ORGANISM, UNSPECIFIED WHETHER ACUTE ORGAN DYSFUNCTION PRESENT (MULTI): ICD-10-CM

## 2025-06-10 LAB
ALBUMIN SERPL BCP-MCNC: 4.2 G/DL (ref 3.4–5)
ALP SERPL-CCNC: 76 U/L (ref 33–136)
ALT SERPL W P-5'-P-CCNC: 9 U/L (ref 10–52)
ANION GAP SERPL CALC-SCNC: 13 MMOL/L (ref 10–20)
APPEARANCE UR: ABNORMAL
AST SERPL W P-5'-P-CCNC: 19 U/L (ref 9–39)
ATRIAL RATE: 122 BPM
BACTERIA #/AREA URNS AUTO: ABNORMAL /HPF
BASOPHILS # BLD AUTO: 0.03 X10*3/UL (ref 0–0.1)
BASOPHILS NFR BLD AUTO: 0.2 %
BILIRUB SERPL-MCNC: 0.5 MG/DL (ref 0–1.2)
BILIRUB UR STRIP.AUTO-MCNC: NEGATIVE MG/DL
BNP SERPL-MCNC: 235 PG/ML (ref 0–99)
BUN SERPL-MCNC: 25 MG/DL (ref 6–23)
CALCIUM SERPL-MCNC: 10 MG/DL (ref 8.6–10.3)
CARDIAC TROPONIN I PNL SERPL HS: 15 NG/L (ref 0–20)
CHLORIDE SERPL-SCNC: 98 MMOL/L (ref 98–107)
CK SERPL-CCNC: 51 U/L (ref 0–325)
CO2 SERPL-SCNC: 28 MMOL/L (ref 21–32)
COLOR UR: ABNORMAL
CREAT SERPL-MCNC: 1.93 MG/DL (ref 0.5–1.3)
EGFRCR SERPLBLD CKD-EPI 2021: 37 ML/MIN/1.73M*2
EOSINOPHIL # BLD AUTO: 0.2 X10*3/UL (ref 0–0.4)
EOSINOPHIL NFR BLD AUTO: 1.4 %
ERYTHROCYTE [DISTWIDTH] IN BLOOD BY AUTOMATED COUNT: 16.7 % (ref 11.5–14.5)
GLUCOSE SERPL-MCNC: 160 MG/DL (ref 74–99)
GLUCOSE UR STRIP.AUTO-MCNC: ABNORMAL MG/DL
HCT VFR BLD AUTO: 41.6 % (ref 41–52)
HGB BLD-MCNC: 13.1 G/DL (ref 13.5–17.5)
HOLD SPECIMEN: NORMAL
IMM GRANULOCYTES # BLD AUTO: 0.09 X10*3/UL (ref 0–0.5)
IMM GRANULOCYTES NFR BLD AUTO: 0.6 % (ref 0–0.9)
INR PPP: 1.9 (ref 0.9–1.1)
KETONES UR STRIP.AUTO-MCNC: NEGATIVE MG/DL
LACTATE SERPL-SCNC: 2 MMOL/L (ref 0.4–2)
LEUKOCYTE ESTERASE UR QL STRIP.AUTO: ABNORMAL
LYMPHOCYTES # BLD AUTO: 0.73 X10*3/UL (ref 0.8–3)
LYMPHOCYTES NFR BLD AUTO: 5 %
MCH RBC QN AUTO: 29 PG (ref 26–34)
MCHC RBC AUTO-ENTMCNC: 31.5 G/DL (ref 32–36)
MCV RBC AUTO: 92 FL (ref 80–100)
MONOCYTES # BLD AUTO: 0.97 X10*3/UL (ref 0.05–0.8)
MONOCYTES NFR BLD AUTO: 6.6 %
NEUTROPHILS # BLD AUTO: 12.64 X10*3/UL (ref 1.6–5.5)
NEUTROPHILS NFR BLD AUTO: 86.2 %
NITRITE UR QL STRIP.AUTO: NEGATIVE
NRBC BLD-RTO: 0 /100 WBCS (ref 0–0)
PH UR STRIP.AUTO: 7.5 [PH]
PLATELET # BLD AUTO: 378 X10*3/UL (ref 150–450)
POTASSIUM SERPL-SCNC: 3.5 MMOL/L (ref 3.5–5.3)
PROT SERPL-MCNC: 8.9 G/DL (ref 6.4–8.2)
PROT UR STRIP.AUTO-MCNC: ABNORMAL MG/DL
PROTHROMBIN TIME: 21.1 SECONDS (ref 9.8–12.4)
Q ONSET: 205 MS
QRS COUNT: 21 BEATS
QRS DURATION: 88 MS
QT INTERVAL: 292 MS
QTC CALCULATION(BAZETT): 416 MS
QTC FREDERICIA: 369 MS
R AXIS: 81 DEGREES
RBC # BLD AUTO: 4.52 X10*6/UL (ref 4.5–5.9)
RBC # UR STRIP.AUTO: ABNORMAL MG/DL
RBC #/AREA URNS AUTO: >20 /HPF
SODIUM SERPL-SCNC: 135 MMOL/L (ref 136–145)
SP GR UR STRIP.AUTO: 1.02
T AXIS: 123 DEGREES
T OFFSET: 351 MS
UROBILINOGEN UR STRIP.AUTO-MCNC: NORMAL MG/DL
VENTRICULAR RATE: 122 BPM
WBC # BLD AUTO: 14.7 X10*3/UL (ref 4.4–11.3)
WBC #/AREA URNS AUTO: >50 /HPF
WBC CLUMPS #/AREA URNS AUTO: ABNORMAL /HPF
YEAST BUDDING #/AREA UR COMP ASSIST: PRESENT /HPF

## 2025-06-10 PROCEDURE — 83880 ASSAY OF NATRIURETIC PEPTIDE: CPT | Performed by: INTERNAL MEDICINE

## 2025-06-10 PROCEDURE — 2500000004 HC RX 250 GENERAL PHARMACY W/ HCPCS (ALT 636 FOR OP/ED): Performed by: PHYSICIAN ASSISTANT

## 2025-06-10 PROCEDURE — 81001 URINALYSIS AUTO W/SCOPE: CPT | Performed by: EMERGENCY MEDICINE

## 2025-06-10 PROCEDURE — 74177 CT ABD & PELVIS W/CONTRAST: CPT

## 2025-06-10 PROCEDURE — 70450 CT HEAD/BRAIN W/O DYE: CPT

## 2025-06-10 PROCEDURE — 72125 CT NECK SPINE W/O DYE: CPT

## 2025-06-10 PROCEDURE — 87040 BLOOD CULTURE FOR BACTERIA: CPT | Mod: ELYLAB | Performed by: PHYSICIAN ASSISTANT

## 2025-06-10 PROCEDURE — 87449 NOS EACH ORGANISM AG IA: CPT | Mod: ELYLAB | Performed by: INTERNAL MEDICINE

## 2025-06-10 PROCEDURE — G0390 TRAUMA RESPONS W/HOSP CRITI: HCPCS

## 2025-06-10 PROCEDURE — 99285 EMERGENCY DEPT VISIT HI MDM: CPT | Mod: 25 | Performed by: EMERGENCY MEDICINE

## 2025-06-10 PROCEDURE — 36415 COLL VENOUS BLD VENIPUNCTURE: CPT | Performed by: PHYSICIAN ASSISTANT

## 2025-06-10 PROCEDURE — 1200000002 HC GENERAL ROOM WITH TELEMETRY DAILY

## 2025-06-10 PROCEDURE — 85610 PROTHROMBIN TIME: CPT | Performed by: PHYSICIAN ASSISTANT

## 2025-06-10 PROCEDURE — 85025 COMPLETE CBC W/AUTO DIFF WBC: CPT | Performed by: PHYSICIAN ASSISTANT

## 2025-06-10 PROCEDURE — 84075 ASSAY ALKALINE PHOSPHATASE: CPT | Performed by: PHYSICIAN ASSISTANT

## 2025-06-10 PROCEDURE — 82550 ASSAY OF CK (CPK): CPT | Performed by: INTERNAL MEDICINE

## 2025-06-10 PROCEDURE — 87086 URINE CULTURE/COLONY COUNT: CPT | Mod: ELYLAB | Performed by: EMERGENCY MEDICINE

## 2025-06-10 PROCEDURE — 2500000004 HC RX 250 GENERAL PHARMACY W/ HCPCS (ALT 636 FOR OP/ED): Performed by: INTERNAL MEDICINE

## 2025-06-10 PROCEDURE — 84484 ASSAY OF TROPONIN QUANT: CPT | Performed by: PHYSICIAN ASSISTANT

## 2025-06-10 PROCEDURE — 96365 THER/PROPH/DIAG IV INF INIT: CPT | Mod: 59

## 2025-06-10 PROCEDURE — 93005 ELECTROCARDIOGRAM TRACING: CPT

## 2025-06-10 PROCEDURE — 87899 AGENT NOS ASSAY W/OPTIC: CPT | Mod: ELYLAB | Performed by: INTERNAL MEDICINE

## 2025-06-10 PROCEDURE — 96372 THER/PROPH/DIAG INJ SC/IM: CPT | Performed by: PHYSICIAN ASSISTANT

## 2025-06-10 PROCEDURE — 83605 ASSAY OF LACTIC ACID: CPT | Performed by: PHYSICIAN ASSISTANT

## 2025-06-10 PROCEDURE — 2500000001 HC RX 250 WO HCPCS SELF ADMINISTERED DRUGS (ALT 637 FOR MEDICARE OP): Performed by: INTERNAL MEDICINE

## 2025-06-10 PROCEDURE — 96361 HYDRATE IV INFUSION ADD-ON: CPT

## 2025-06-10 PROCEDURE — 2550000001 HC RX 255 CONTRASTS: Performed by: EMERGENCY MEDICINE

## 2025-06-10 RX ORDER — ROSUVASTATIN CALCIUM 20 MG/1
20 TABLET, COATED ORAL NIGHTLY
Status: DISCONTINUED | OUTPATIENT
Start: 2025-06-10 | End: 2025-06-10 | Stop reason: HOSPADM

## 2025-06-10 RX ORDER — FINASTERIDE 5 MG/1
5 TABLET, FILM COATED ORAL DAILY
Status: DISCONTINUED | OUTPATIENT
Start: 2025-06-10 | End: 2025-06-10 | Stop reason: HOSPADM

## 2025-06-10 RX ORDER — ASPIRIN 81 MG/1
81 TABLET ORAL DAILY
Status: DISCONTINUED | OUTPATIENT
Start: 2025-06-11 | End: 2025-06-10 | Stop reason: HOSPADM

## 2025-06-10 RX ORDER — SODIUM CHLORIDE, SODIUM LACTATE, POTASSIUM CHLORIDE, CALCIUM CHLORIDE 600; 310; 30; 20 MG/100ML; MG/100ML; MG/100ML; MG/100ML
75 INJECTION, SOLUTION INTRAVENOUS CONTINUOUS
Status: DISCONTINUED | OUTPATIENT
Start: 2025-06-10 | End: 2025-06-10 | Stop reason: HOSPADM

## 2025-06-10 RX ORDER — PANTOPRAZOLE SODIUM 40 MG/1
1 TABLET, DELAYED RELEASE ORAL DAILY
COMMUNITY

## 2025-06-10 RX ORDER — METOPROLOL TARTRATE 25 MG/1
12.5 TABLET, FILM COATED ORAL 2 TIMES DAILY
Status: DISCONTINUED | OUTPATIENT
Start: 2025-06-10 | End: 2025-06-10 | Stop reason: HOSPADM

## 2025-06-10 RX ORDER — LANOLIN ALCOHOL/MO/W.PET/CERES
100 CREAM (GRAM) TOPICAL DAILY
Status: DISCONTINUED | OUTPATIENT
Start: 2025-06-10 | End: 2025-06-10 | Stop reason: HOSPADM

## 2025-06-10 RX ORDER — ACETAMINOPHEN 500 MG
5 TABLET ORAL NIGHTLY PRN
Status: DISCONTINUED | OUTPATIENT
Start: 2025-06-10 | End: 2025-06-10 | Stop reason: HOSPADM

## 2025-06-10 RX ORDER — CEFTRIAXONE 1 G/50ML
1 INJECTION, SOLUTION INTRAVENOUS EVERY 24 HOURS
Status: DISCONTINUED | OUTPATIENT
Start: 2025-06-11 | End: 2025-06-10 | Stop reason: HOSPADM

## 2025-06-10 RX ORDER — CEFTRIAXONE 2 G/50ML
2 INJECTION, SOLUTION INTRAVENOUS ONCE
Status: COMPLETED | OUTPATIENT
Start: 2025-06-10 | End: 2025-06-10

## 2025-06-10 RX ORDER — ASPIRIN 81 MG/1
81 TABLET ORAL DAILY
COMMUNITY

## 2025-06-10 RX ORDER — CILOSTAZOL 100 MG/1
100 TABLET ORAL
COMMUNITY

## 2025-06-10 RX ORDER — METOPROLOL TARTRATE 25 MG/1
0.5 TABLET, FILM COATED ORAL 2 TIMES DAILY
COMMUNITY

## 2025-06-10 RX ORDER — FUROSEMIDE 40 MG/1
40 TABLET ORAL DAILY PRN
COMMUNITY

## 2025-06-10 RX ORDER — AZITHROMYCIN 250 MG/1
500 TABLET, FILM COATED ORAL EVERY 24 HOURS
Status: DISCONTINUED | OUTPATIENT
Start: 2025-06-11 | End: 2025-06-10 | Stop reason: HOSPADM

## 2025-06-10 RX ORDER — OLANZAPINE 10 MG/2ML
2.5 INJECTION, POWDER, FOR SOLUTION INTRAMUSCULAR ONCE
Status: COMPLETED | OUTPATIENT
Start: 2025-06-10 | End: 2025-06-10

## 2025-06-10 RX ORDER — FINASTERIDE 5 MG/1
5 TABLET, FILM COATED ORAL DAILY
COMMUNITY

## 2025-06-10 RX ORDER — ONDANSETRON HYDROCHLORIDE 2 MG/ML
4 INJECTION, SOLUTION INTRAVENOUS EVERY 6 HOURS PRN
Status: DISCONTINUED | OUTPATIENT
Start: 2025-06-10 | End: 2025-06-10 | Stop reason: HOSPADM

## 2025-06-10 RX ORDER — DABIGATRAN ETEXILATE 150 MG/1
150 CAPSULE ORAL 2 TIMES DAILY
Status: CANCELLED | OUTPATIENT
Start: 2025-06-10

## 2025-06-10 RX ORDER — ACETAMINOPHEN 325 MG/1
650 TABLET ORAL EVERY 4 HOURS PRN
Status: DISCONTINUED | OUTPATIENT
Start: 2025-06-10 | End: 2025-06-10 | Stop reason: HOSPADM

## 2025-06-10 RX ORDER — ROSUVASTATIN CALCIUM 20 MG/1
20 TABLET, COATED ORAL NIGHTLY
COMMUNITY

## 2025-06-10 RX ORDER — PANTOPRAZOLE SODIUM 40 MG/1
40 TABLET, DELAYED RELEASE ORAL DAILY
Status: DISCONTINUED | OUTPATIENT
Start: 2025-06-10 | End: 2025-06-10 | Stop reason: HOSPADM

## 2025-06-10 RX ORDER — POLYETHYLENE GLYCOL 3350 17 G/17G
17 POWDER, FOR SOLUTION ORAL DAILY PRN
Status: DISCONTINUED | OUTPATIENT
Start: 2025-06-10 | End: 2025-06-10 | Stop reason: HOSPADM

## 2025-06-10 RX ADMIN — OLANZAPINE 2.5 MG: 10 INJECTION, POWDER, FOR SOLUTION INTRAMUSCULAR at 05:22

## 2025-06-10 RX ADMIN — AZITHROMYCIN MONOHYDRATE 500 MG: 500 INJECTION, POWDER, LYOPHILIZED, FOR SOLUTION INTRAVENOUS at 08:20

## 2025-06-10 RX ADMIN — IOHEXOL 100 ML: 350 INJECTION, SOLUTION INTRAVENOUS at 05:01

## 2025-06-10 RX ADMIN — SODIUM CHLORIDE, SODIUM LACTATE, POTASSIUM CHLORIDE, AND CALCIUM CHLORIDE 75 ML/HR: .6; .31; .03; .02 INJECTION, SOLUTION INTRAVENOUS at 10:23

## 2025-06-10 RX ADMIN — SODIUM CHLORIDE, POTASSIUM CHLORIDE, SODIUM LACTATE AND CALCIUM CHLORIDE 1000 ML: 600; 310; 30; 20 INJECTION, SOLUTION INTRAVENOUS at 11:50

## 2025-06-10 RX ADMIN — PANTOPRAZOLE SODIUM 40 MG: 40 TABLET, DELAYED RELEASE ORAL at 11:20

## 2025-06-10 RX ADMIN — APIXABAN 5 MG: 5 TABLET, FILM COATED ORAL at 11:20

## 2025-06-10 RX ADMIN — SODIUM CHLORIDE 1000 ML: 0.9 INJECTION, SOLUTION INTRAVENOUS at 09:22

## 2025-06-10 RX ADMIN — SODIUM CHLORIDE 1000 ML: 0.9 INJECTION, SOLUTION INTRAVENOUS at 05:22

## 2025-06-10 RX ADMIN — CEFTRIAXONE 2 G: 2 INJECTION, SOLUTION INTRAVENOUS at 06:49

## 2025-06-10 ASSESSMENT — LIFESTYLE VARIABLES
HAVE YOU EVER FELT YOU SHOULD CUT DOWN ON YOUR DRINKING: NO
EVER HAD A DRINK FIRST THING IN THE MORNING TO STEADY YOUR NERVES TO GET RID OF A HANGOVER: NO
HAVE PEOPLE ANNOYED YOU BY CRITICIZING YOUR DRINKING: NO
EVER FELT BAD OR GUILTY ABOUT YOUR DRINKING: NO
TOTAL SCORE: 0

## 2025-06-10 NOTE — PROGRESS NOTES
Emergency Medicine Transition of Care Note    I received Ivan Bravo in signout from Bennett Carrasco.  Please see the previous ED provider note for all HPI, PE and MDM up to the time of signout at 6:15 AM. This is in addition to the primary record.    In brief Ivan Bravo is an 69 y.o. male presenting for   Chief Complaint   Patient presents with    Fall     At the time of signout we were awaiting: Urinalysis and admission    Diagnoses as of 06/10/25 0700   Fall, initial encounter   Closed head injury, initial encounter   Acute renal injury   Generalized weakness   Dementia, unspecified dementia severity, unspecified dementia type, unspecified whether behavioral, psychotic, or mood disturbance or anxiety (Multi)   Sepsis, due to unspecified organism, unspecified whether acute organ dysfunction present (Multi)       Medical Decision Making  Appears patient has urinary tract infection with associated tachycardia concerning for sepsis.  Appears the sepsis timer was started about 1 hour ago.  I ordered IV Rocephin and Zithromax to cover for potential pneumonia as well as patient's urinary tract infection.  Patient already received IV fluids but I have ordered 1 more liter as patient continues to be tachycardic.  This is roughly 30 cc/kg.  patient's troponin negative, acute renal injury with a creatinine of 1.93 GFR 37, elevated white count with left shift.    Patient's CT study showed no acute intracranial hemorrhage, patient has patchy groundglass opacities Throughout the lungs concerning for infection.  There is no injury throughout the abdomen pelvis, negative C-spine, T-spine, L-spine    Diagnosis: Fall, closed head injury, acute renal injury, generalized weakness, sepsis    I discussed the case with Dr. Sue who agrees with the patient under service      Labs Reviewed   CBC WITH AUTO DIFFERENTIAL - Abnormal       Result Value    WBC 14.7 (*)     nRBC 0.0      RBC 4.52      Hemoglobin 13.1 (*)      Hematocrit 41.6      MCV 92      MCH 29.0      MCHC 31.5 (*)     RDW 16.7 (*)     Platelets 378      Neutrophils % 86.2      Immature Granulocytes %, Automated 0.6      Lymphocytes % 5.0      Monocytes % 6.6      Eosinophils % 1.4      Basophils % 0.2      Neutrophils Absolute 12.64 (*)     Immature Granulocytes Absolute, Automated 0.09      Lymphocytes Absolute 0.73 (*)     Monocytes Absolute 0.97 (*)     Eosinophils Absolute 0.20      Basophils Absolute 0.03     COMPREHENSIVE METABOLIC PANEL - Abnormal    Glucose 160 (*)     Sodium 135 (*)     Potassium 3.5      Chloride 98      Bicarbonate 28      Anion Gap 13      Urea Nitrogen 25 (*)     Creatinine 1.93 (*)     eGFR 37 (*)     Calcium 10.0      Albumin 4.2      Alkaline Phosphatase 76      Total Protein 8.9 (*)     AST 19      Bilirubin, Total 0.5      ALT 9 (*)    PROTIME-INR - Abnormal    Protime 21.1 (*)     INR 1.9 (*)    URINALYSIS WITH REFLEX CULTURE AND MICROSCOPIC - Abnormal    Color, Urine Light-Orange (*)     Appearance, Urine Ex.Turbid (*)     Specific Gravity, Urine 1.017      pH, Urine 7.5      Protein, Urine 70 (1+) (*)     Glucose, Urine 30 (TRACE) (*)     Blood, Urine 0.2 (2+) (*)     Ketones, Urine NEGATIVE      Bilirubin, Urine NEGATIVE      Urobilinogen, Urine Normal      Nitrite, Urine NEGATIVE      Leukocyte Esterase, Urine 500 Walter/uL (*)    MICROSCOPIC ONLY, URINE - Abnormal    WBC, Urine >50 (*)     WBC Clumps, Urine FEW      RBC, Urine >20 (*)     Bacteria, Urine 1+ (*)     Budding Yeast, Urine PRESENT (*)    TROPONIN I, HIGH SENSITIVITY - Normal    Troponin I, High Sensitivity 15      Narrative:     Less than 99th percentile of normal range cutoff-  Female and children under 18 years old <14 ng/L; Male <21 ng/L: Negative  Repeat testing should be performed if clinically indicated.     Female and children under 18 years old 14-50 ng/L; Male 21-50 ng/L:  Consistent with possible cardiac damage and possible increased clinical   risk.  Serial measurements may help to assess extent of myocardial damage.     >50 ng/L: Consistent with cardiac damage, increased clinical risk and  myocardial infarction. Serial measurements may help assess extent of   myocardial damage.      NOTE: Children less than 1 year old may have higher baseline troponin   levels and results should be interpreted in conjunction with the overall   clinical context.     NOTE: Troponin I testing is performed using a different   testing methodology at [facility] than at other   [facility]. Direct result comparisons should only   be made within the same method.   LACTATE - Normal    Lactate 2.0      Narrative:     Venipuncture immediately after or during the administration of Metamizole may lead to falsely low results. Testing should be performed immediately prior to Metamizole dosing.   URINE CULTURE   BLOOD CULTURE   BLOOD CULTURE   URINALYSIS WITH REFLEX CULTURE AND MICROSCOPIC    Narrative:     The following orders were created for panel order Urinalysis with Reflex Culture and Microscopic.  Procedure                               Abnormality         Status                     ---------                               -----------         ------                     Urinalysis with Reflex C...[[id number]]  Abnormal            Final result               Extra Urine Gray Tube[[id number]]                            In process                   Please view results for these tests on the individual orders.   EXTRA URINE GRAY TUBE        CT lumbar spine retrospective reconstruction protocol   Final Result        No acute intracranial hemorrhage, mass effect or midline shift.        Nonspecific scattered white matter hypodensities favored to represent   sequela of small vessel ischemia. Cervical spondylosis without acute   loss of vertebral body height or traumatic malalignment.        Patchy ground-glass opacities scattered throughout the lungs   concerning for an  infectious or inflammatory process. Right upper   lobe opacities are a bit more confluent in appearance and more   pronounced along the anterior/periphery of the lung for which a   component of pulmonary contusion in the setting of trauma is not   excluded.        No acute fracture or traumatic malalignment of the thoracic spine.        No acute traumatic injury throughout the abdomen or pelvis.        Cholelithiasis. No CT evidence for acute cholecystitis.        Colonic diverticulosis without CT evidence of acute diverticulitis.        Concentric bladder wall thickening with adjacent stranding along with   prominence of the distal ureters bilaterally. Findings are concerning   for cystitis and developing ascending urinary tract infection.   Correlate with urinalysis.        Focal ectasia of the infrarenal abdominal aorta measuring up to 2.5   cm in maximum AP dimension        Lumbar spondylosis without acute fracture or traumatic malalignment        MACRO:   None.        Signed by: Evan Finkelstein 6/10/2025 6:03 AM   Dictation workstation:   [id number]      CT thoracic spine retrospective reconstruction protocol   Final Result        No acute intracranial hemorrhage, mass effect or midline shift.        Nonspecific scattered white matter hypodensities favored to represent   sequela of small vessel ischemia. Cervical spondylosis without acute   loss of vertebral body height or traumatic malalignment.        Patchy ground-glass opacities scattered throughout the lungs   concerning for an infectious or inflammatory process. Right upper   lobe opacities are a bit more confluent in appearance and more   pronounced along the anterior/periphery of the lung for which a   component of pulmonary contusion in the setting of trauma is not   excluded.        No acute fracture or traumatic malalignment of the thoracic spine.        No acute traumatic injury throughout the abdomen or pelvis.        Cholelithiasis. No CT  evidence for acute cholecystitis.        Colonic diverticulosis without CT evidence of acute diverticulitis.        Concentric bladder wall thickening with adjacent stranding along with   prominence of the distal ureters bilaterally. Findings are concerning   for cystitis and developing ascending urinary tract infection.   Correlate with urinalysis.        Focal ectasia of the infrarenal abdominal aorta measuring up to 2.5   cm in maximum AP dimension        Lumbar spondylosis without acute fracture or traumatic malalignment        MACRO:   None.        Signed by: Evan Finkelstein 6/10/2025 6:03 AM   Dictation workstation:   [id number]      CT chest abdomen pelvis w IV contrast   Final Result        No acute intracranial hemorrhage, mass effect or midline shift.        Nonspecific scattered white matter hypodensities favored to represent   sequela of small vessel ischemia. Cervical spondylosis without acute   loss of vertebral body height or traumatic malalignment.        Patchy ground-glass opacities scattered throughout the lungs   concerning for an infectious or inflammatory process. Right upper   lobe opacities are a bit more confluent in appearance and more   pronounced along the anterior/periphery of the lung for which a   component of pulmonary contusion in the setting of trauma is not   excluded.        No acute fracture or traumatic malalignment of the thoracic spine.        No acute traumatic injury throughout the abdomen or pelvis.        Cholelithiasis. No CT evidence for acute cholecystitis.        Colonic diverticulosis without CT evidence of acute diverticulitis.        Concentric bladder wall thickening with adjacent stranding along with   prominence of the distal ureters bilaterally. Findings are concerning   for cystitis and developing ascending urinary tract infection.   Correlate with urinalysis.        Focal ectasia of the infrarenal abdominal aorta measuring up to 2.5   cm in maximum AP  dimension        Lumbar spondylosis without acute fracture or traumatic malalignment        MACRO:   None.        Signed by: Evan Finkelstein 6/10/2025 6:03 AM   Dictation workstation:   [id number]      CT head wo IV contrast   Final Result        No acute intracranial hemorrhage, mass effect or midline shift.        Nonspecific scattered white matter hypodensities favored to represent   sequela of small vessel ischemia. Cervical spondylosis without acute   loss of vertebral body height or traumatic malalignment.        Patchy ground-glass opacities scattered throughout the lungs   concerning for an infectious or inflammatory process. Right upper   lobe opacities are a bit more confluent in appearance and more   pronounced along the anterior/periphery of the lung for which a   component of pulmonary contusion in the setting of trauma is not   excluded.        No acute fracture or traumatic malalignment of the thoracic spine.        No acute traumatic injury throughout the abdomen or pelvis.        Cholelithiasis. No CT evidence for acute cholecystitis.        Colonic diverticulosis without CT evidence of acute diverticulitis.        Concentric bladder wall thickening with adjacent stranding along with   prominence of the distal ureters bilaterally. Findings are concerning   for cystitis and developing ascending urinary tract infection.   Correlate with urinalysis.        Focal ectasia of the infrarenal abdominal aorta measuring up to 2.5   cm in maximum AP dimension        Lumbar spondylosis without acute fracture or traumatic malalignment        MACRO:   None.        Signed by: Evan Finkelstein 6/10/2025 6:03 AM   Dictation workstation:   [id number]      CT cervical spine wo IV contrast   Final Result        No acute intracranial hemorrhage, mass effect or midline shift.        Nonspecific scattered white matter hypodensities favored to represent   sequela of small vessel ischemia. Cervical spondylosis without  acute   loss of vertebral body height or traumatic malalignment.        Patchy ground-glass opacities scattered throughout the lungs   concerning for an infectious or inflammatory process. Right upper   lobe opacities are a bit more confluent in appearance and more   pronounced along the anterior/periphery of the lung for which a   component of pulmonary contusion in the setting of trauma is not   excluded.        No acute fracture or traumatic malalignment of the thoracic spine.        No acute traumatic injury throughout the abdomen or pelvis.        Cholelithiasis. No CT evidence for acute cholecystitis.        Colonic diverticulosis without CT evidence of acute diverticulitis.        Concentric bladder wall thickening with adjacent stranding along with   prominence of the distal ureters bilaterally. Findings are concerning   for cystitis and developing ascending urinary tract infection.   Correlate with urinalysis.        Focal ectasia of the infrarenal abdominal aorta measuring up to 2.5   cm in maximum AP dimension        Lumbar spondylosis without acute fracture or traumatic malalignment        MACRO:   None.        Signed by: Evan Finkelstein 6/10/2025 6:03 AM   Dictation workstation:   [id number]            Final diagnoses:   [W19.XXXA] Fall, initial encounter   [S09.90XA] Closed head injury, initial encounter   [N17.9] Acute renal injury   [R53.1] Generalized weakness   [F03.90] Dementia, unspecified dementia severity, unspecified dementia type, unspecified whether behavioral, psychotic, or mood disturbance or anxiety (Multi)   [A41.9] Sepsis, due to unspecified organism, unspecified whether acute organ dysfunction present (Multi)           Procedure  ECG 12 lead    Performed by: Jose Leung PA-C  Authorized by: Bennett Carrasco PA-C    Interpretation:     Details:  My EKG interpretation  Rate:     ECG rate:  122    ECG rate assessment: tachycardic    Rhythm:     Rhythm: atrial fibrillation    ST  segments:     ST segments:  Normal  T waves:     T waves: normal        Jose Leung PA-C

## 2025-06-10 NOTE — H&P
Medical Group History and Physical    ASSESSMENT & PLAN:     69M with PMH of pAfib on Eliquis, AUD in remission, HFpEF, CAD s/p CABG, dysphagia with hx of aspiration PNA who presented with fall. Found to have sepsis d/t UTI and PNA.     Sepsis with YOLY and acute metabolic encephalopathy  Acute cystitis  Acute multifocal CAP, suspect aspiration given hx  Mechanical fall  Generalized weakness  pAfib on Eliquis  Chronic HFpEF  CAD s/p CABG  AUD in remission  Dysphagia    -sepsis as evidenced by tachycardia, leukocytosis, YOLY d/t likely PNA and UTI  -now improving already, mental status returned to baseline    Plan:  -cont empiric CTX, azithro for both PNA and UTI coverage  -fu bcx, ucx  -check sputum cx, procal, urine legionella, urine strep  -cont gentle IVF for soft bps  -trend labs  -tele  -pt/ot eval  -slp eval. Per wife, uses bite sized diet with mildly thickened liquids at home, will order here.   -other home meds as ordered    Vte ppx already on eliquis  Full code  Dispo tbd    Alvarado Ryan MD    HISTORY OF PRESENT ILLNESS:   Chief Complaint: fall    History Of Present Illness:    69M with PMH of pAfib on OAC, AUD in remission, HFpEF, CAD s/p CABG, dysphagia with hx of aspiration PNA who presented with fall. He was getting up from toilet around 3AM this morning, when he reached down to pick something up, and went down. No syncope. Denies any other sx currently. Denies CP, dyspnea, palpitations, HA, vision changes. He denies dysuria, but is unsure about frequency or urgency. Per the wife at bedside, he was confused when EMS came to get him, but has now returned to his mental baseline. Recently quit smoking cigarettes and drinking etoh. Uses medical [facility]. Lives with wife.     In the ED, afebrile, tachycardic to 110-130s, normotensive, on room air. Labs showed YOLY, leukocytosis. UA suggestive of UTI. Had extensive imaging to eval for trauma, neg for any acute traumatic findings, did show patchy GGO in both lungs  with some more confluence in RUL, as well as bladder wall thickening. Received IVF, IV abx, and IM Zyprexa presumably for agitation last night.     Review of systems: 10 point review of systems is otherwise negative except as mentioned above.    PAST HISTORIES:     Past Medical History:  He has no past medical history on file.    Past Surgical History:  He has no past surgical history on file.      Social History:  He reports current alcohol use of about 12.0 standard drinks of alcohol per week. He reports that he does not currently use drugs. No history on file for tobacco use.    Family History:  Family History[1]     Allergies:  Oxycodone, Simvastatin, and Tamsulosin    OBJECTIVE:     Last Recorded Vitals:  Vitals:    06/10/25 0505 06/10/25 0520 06/10/25 0530 06/10/25 0800   BP: (!) 118/98 (!) 129/93 122/89    Pulse: (!) 130 (!) 114 (!) 115 96   Resp: 18 18 (!) 23 (!) 22   SpO2: 94%  94% 95%   Weight:       Height:           Last I/O:  No intake/output data recorded.    Physical Exam:  GEN: ill appearing, appears stated age, NAD, disheveled  CV: rrr, no m/r/g, no LE edema  LUNGS: scattered crackles throughout b/l, no acute resp distress, on room air  ABD: soft, obese  NEURO: A+Ox3, no FND    Scheduled Medications  Scheduled Medications[2]    PRN Medications  PRN Medications[3]    Continuous Medications  Continuous Medications[4]    Outpatient Medications:  Prior to Admission medications    Medication Sig Start Date End Date Taking? Authorizing Provider   cyclobenzaprine (Flexeril) 10 mg tablet Take 1 tablet (10 mg) by mouth 2 times a day. 6/21/24   Historical Provider, MD   dabigatran etexilate (Pradaxa) 150 mg capsule Take 1 capsule (150 mg) by mouth. 6/26/24   Historical Provider, MD   gabapentin (Neurontin) 400 mg capsule Take 1 capsule (400 mg) by mouth. 2/14/24   Historical Provider, MD   metoprolol tartrate (Lopressor) 75 mg tablet Take 1 tablet (75 mg) by mouth 2 times a day. 11/25/24   Alvarado Ryan MD    multivitamin with minerals tablet Take 1 tablet by mouth once daily. 11/26/24   Alvarado Ryan MD   thiamine (Vitamin B-1) 100 mg tablet Take 1 tablet (100 mg) by mouth once daily. 11/26/24   Alvarado Ryan MD       LABS AND IMAGING:     Labs:  Results for orders placed or performed during the hospital encounter of 06/10/25 (from the past 24 hours)   CBC and Auto Differential   Result Value Ref Range    WBC 14.7 (H) 4.4 - 11.3 x10*3/uL    nRBC 0.0 0.0 - 0.0 /100 WBCs    RBC 4.52 4.50 - 5.90 x10*6/uL    Hemoglobin 13.1 (L) 13.5 - 17.5 g/dL    Hematocrit 41.6 41.0 - 52.0 %    MCV 92 80 - 100 fL    MCH 29.0 26.0 - 34.0 pg    MCHC 31.5 (L) 32.0 - 36.0 g/dL    RDW 16.7 (H) 11.5 - 14.5 %    Platelets 378 150 - 450 x10*3/uL    Neutrophils % 86.2 40.0 - 80.0 %    Immature Granulocytes %, Automated 0.6 0.0 - 0.9 %    Lymphocytes % 5.0 13.0 - 44.0 %    Monocytes % 6.6 2.0 - 10.0 %    Eosinophils % 1.4 0.0 - 6.0 %    Basophils % 0.2 0.0 - 2.0 %    Neutrophils Absolute 12.64 (H) 1.60 - 5.50 x10*3/uL    Immature Granulocytes Absolute, Automated 0.09 0.00 - 0.50 x10*3/uL    Lymphocytes Absolute 0.73 (L) 0.80 - 3.00 x10*3/uL    Monocytes Absolute 0.97 (H) 0.05 - 0.80 x10*3/uL    Eosinophils Absolute 0.20 0.00 - 0.40 x10*3/uL    Basophils Absolute 0.03 0.00 - 0.10 x10*3/uL   Comprehensive metabolic panel   Result Value Ref Range    Glucose 160 (H) 74 - 99 mg/dL    Sodium 135 (L) 136 - 145 mmol/L    Potassium 3.5 3.5 - 5.3 mmol/L    Chloride 98 98 - 107 mmol/L    Bicarbonate 28 21 - 32 mmol/L    Anion Gap 13 10 - 20 mmol/L    Urea Nitrogen 25 (H) 6 - 23 mg/dL    Creatinine 1.93 (H) 0.50 - 1.30 mg/dL    eGFR 37 (L) >60 mL/min/1.73m*2    Calcium 10.0 8.6 - 10.3 mg/dL    Albumin 4.2 3.4 - 5.0 g/dL    Alkaline Phosphatase 76 33 - 136 U/L    Total Protein 8.9 (H) 6.4 - 8.2 g/dL    AST 19 9 - 39 U/L    Bilirubin, Total 0.5 0.0 - 1.2 mg/dL    ALT 9 (L) 10 - 52 U/L   Protime-INR   Result Value Ref Range    Protime 21.1 (H) 9.8 - 12.4  seconds    INR 1.9 (H) 0.9 - 1.1   Troponin I, High Sensitivity   Result Value Ref Range    Troponin I, High Sensitivity 15 0 - 20 ng/L   Urinalysis with Reflex Culture and Microscopic   Result Value Ref Range    Color, Urine Light-Orange (N) Light-Yellow, Yellow, Dark-Yellow    Appearance, Urine Ex.Turbid (N) Clear    Specific Gravity, Urine 1.017 1.005 - 1.035    pH, Urine 7.5 5.0, 5.5, 6.0, 6.5, 7.0, 7.5, 8.0    Protein, Urine 70 (1+) (A) NEGATIVE, 10 (TRACE), 20 (TRACE) mg/dL    Glucose, Urine 30 (TRACE) (A) Normal mg/dL    Blood, Urine 0.2 (2+) (A) NEGATIVE mg/dL    Ketones, Urine NEGATIVE NEGATIVE mg/dL    Bilirubin, Urine NEGATIVE NEGATIVE mg/dL    Urobilinogen, Urine Normal Normal mg/dL    Nitrite, Urine NEGATIVE NEGATIVE    Leukocyte Esterase, Urine 500 Walter/uL (A) NEGATIVE   Microscopic Only, Urine   Result Value Ref Range    WBC, Urine >50 (A) 1-5, NONE /HPF    WBC Clumps, Urine FEW Reference range not established. /HPF    RBC, Urine >20 (A) NONE, 1-2, 3-5 /HPF    Bacteria, Urine 1+ (A) NONE SEEN /HPF    Budding Yeast, Urine PRESENT (A) NONE /HPF   ECG 12 lead   Result Value Ref Range    Ventricular Rate 122 BPM    Atrial Rate 122 BPM    QRS Duration 88 ms    QT Interval 292 ms    QTC Calculation(Bazett) 416 ms    R Axis 81 degrees    T Axis 123 degrees    QRS Count 21 beats    Q Onset 205 ms    T Offset 351 ms    QTC Fredericia 369 ms   Lactate   Result Value Ref Range    Lactate 2.0 0.4 - 2.0 mmol/L        Imaging:  ECG 12 lead  Atrial fibrillation with rapid ventricular response with premature ventricular or aberrantly conducted complexes  Septal infarct (cited on or before 09-MAY-2020)  Abnormal ECG  When compared with ECG of 27-FEB-2025 13:17,  Nonspecific T wave abnormality now evident in Lateral leads  CT head wo IV contrast, CT cervical spine wo IV contrast, CT chest abdomen pelvis w IV contrast, CT lumbar spine retrospective reconstruction protocol, CT thoracic spine retrospective reconstruction  protocol  Narrative: Interpreted By:  Finkelstein, Evan,   STUDY:  CT HEAD WO IV CONTRAST; CT THORACIC SPINE RETROSPECTIVE  RECONSTRUCTION PROTOCOL; CT LUMBAR SPINE RETROSPECTIVE RECONSTRUCTION  PROTOCOL; CT CHEST ABDOMEN PELVIS W IV CONTRAST; CT CERVICAL SPINE WO  IV CONTRAST;  6/10/2025 5:00 am; 6/10/2025 5:05 am; 6/10/2025 5:02 am      INDICATION:  Signs/Symptoms:At home, closed head injury, on apixaban.          COMPARISON:  None.      ACCESSION NUMBER(S):  [id number]; [id number]; [id number]; [id number]; [id number]      ORDERING CLINICIAN:  JORDY NUÑEZ      TECHNIQUE:  Noncontrast axial CT images of the head and cervical spine were  obtained. Sagittal and coronal reformatted images were created. Axial  CT images of the chest, abdomen and pelvis were then obtained with a  combined contrast administration of 100 cc Omnipaque 350. Coronal and  sagittal reconstructions were created. Reconstructions were created  of the thoracic and lumbar spine as well in the axial, coronal and  sagittal planes.      FINDINGS:  Head:      EXTRACRANIAL SOFT TISSUES: Unremarkable.      CALVARIUM: No depressed skull fracture. No destructive osseous lesion.      PARANASAL SINUSES/MASTOIDS: The visualized paranasal sinuses and  mastoid air cells are aerated.      HEMORRHAGE: No acute intracranial hemorrhage.      BRAIN PARENCHYMA: Gray-white matter interfaces are preserved. No mass  effect or midline shift. There are nonspecific scattered white matter  hypodensities.      VENTRICLES and EXTRA-AXIAL SPACES: Parenchymal atrophy with  prominence of the ventricles and cortical sulci.      OTHER FINDINGS: There are calcifications within the cavernous carotids      Brain Injury (BIG) guidelines CT values:      Skull fracture: No  SDH (subdural hematoma): None detected  EDH (epidural hematoma): None detected  IPH (intraparenchymal hemorrhage): None detected  SAH (subarachnoid hemorrhage): None detected  IVH (intraventricular  hemorrhage): No      Reference:  Demetri REYES, Kati RS, Monica M, et al. The BIG (brain injury  guidelines) project: defining the management of traumatic brain  injury by acute care surgeons. J Trauma Acute Care Surg.  2014;76:020m646.      CERVICAL SPINE:      ALIGNMENT: No traumatic malalignment  VERTEBRAE: No acute loss of vertebral body height. Bones are  demineralized DISC SPACE: No traumatic disc space narrowing.  SPINAL CANAL: Multilevel facet and uncovertebral arthropathy with  varying degrees of neural foraminal stenosis. No severe central  narrowing PREVERTEBRAL SOFT TISSUES: No prevertebral soft tissue  swelling.      OTHER FINDINGS: Posterior left upper neck swelling      CHEST:  Image quality is degraded by motion artifact.  CHEST WALL AND LOWER NECK: Median sternotomy wires are present.. No  acute osseous abnormality. VESSELS: Aorta is normal caliber.  Atherosclerotic changes in the aorta and coronary arteries. No  traumatic injury. HEART: Normal size. No pericardial effusion.  MEDIASTINUM AND EVE: No pathologically enlarged thoracic lymph  nodes. No retrosternal hematoma. Small hiatal hernia. LUNG, PLEURA,  LARGE AIRWAYS: There are patchy ground-glass opacities scattered  throughout the lungs bilaterally. A component in the right upper lobe  is a bit more confluent and more pronounced along the  anterior/periphery of the lung. Left basilar atelectasis. 5 mm  nodular opacity in the right lower lobe is stable compared to prior  imaging 02/27/2025. No pneumothorax or hemothorax. No pulmonary  contusion or laceration.      ABDOMEN/PELVIS:      LIVER: No laceration or subcapsular hematoma.  BILE DUCTS: Normal caliber.  GALLBLADDER: There are calcified gallstones. No wall thickening or  pericholecystic fluid. PANCREAS: Unremarkable.  SPLEEN: No traumatic injury. Calcifications scattered throughout the  spleen suggesting sequela of prior granulomatous disease. ADRENALS:  Unremarkable. KIDNEYS, URETERS and  BLADDER: Symmetric renal  enhancement. No perinephric fluid collection. Prominence of the  distal ureters bilaterally.. Nonobstructing stones in the kidneys  bilaterally measuring up to approximately 4 mm. Concentric bladder  wall thickening with adjacent stranding. REPRODUCTIVE ORGANS: No  pelvic mass or significant free pelvic fluid.      ABDOMINAL WALL: Within normal limits.  PERITONEUM: No ascites, free air or fluid collection.      BOWEL: Normal caliber. Scattered colonic diverticula without  pericolonic inflammatory stranding. Normal appendix.      VESSELS: Moderate atherosclerotic changes throughout the aorta and  iliac vessels. Focal ectasia of the infrarenal abdominal aorta  measuring up to 2.5 cm in maximum AP dimension. RETROPERITONEUM:  Within normal limits.      BONES: No acute osseous abnormality. Partially imaged intramedullary  nail and screw of the right proximal femur.      Thoracic Spine:      ALIGNMENT: Normal.  VERTEBRAE: No acute loss of vertebral body height.  DISC SPACES: No significant disc space narrowing  SPINAL CANAL: No critical spinal canal stenosis.  PREVERTEBRAL SOFT TISSUES: No prevertebral soft tissue swelling.      LUMBAR SPINE:      ALIGNMENT: Normal.  VERTEBRAE: No acute loss of vertebral body height. Age-indeterminate  right L1 transverse process fracture. DISC SPACES: Disc space  narrowing with vacuum phenomenon L5/S1ee SPINAL CANAL: No critical  spinal canal stenosis. PREVERTEBRAL SOFT TISSUES: No prevertebral  soft tissue swelling.      OTHER FINDINGS: None.          Impression:     No acute intracranial hemorrhage, mass effect or midline shift.      Nonspecific scattered white matter hypodensities favored to represent  sequela of small vessel ischemia. Cervical spondylosis without acute  loss of vertebral body height or traumatic malalignment.      Patchy ground-glass opacities scattered throughout the lungs  concerning for an infectious or inflammatory process. Right  upper  lobe opacities are a bit more confluent in appearance and more  pronounced along the anterior/periphery of the lung for which a  component of pulmonary contusion in the setting of trauma is not  excluded.      No acute fracture or traumatic malalignment of the thoracic spine.      No acute traumatic injury throughout the abdomen or pelvis.      Cholelithiasis. No CT evidence for acute cholecystitis.      Colonic diverticulosis without CT evidence of acute diverticulitis.      Concentric bladder wall thickening with adjacent stranding along with  prominence of the distal ureters bilaterally. Findings are concerning  for cystitis and developing ascending urinary tract infection.  Correlate with urinalysis.      Focal ectasia of the infrarenal abdominal aorta measuring up to 2.5  cm in maximum AP dimension      Lumbar spondylosis without acute fracture or traumatic malalignment      MACRO:  None.      Signed by: Evan Finkelstein 6/10/2025 6:03 AM  Dictation workstation:   [id number]            [1] No family history on file.  [2] azithromycin, 500 mg, intravenous, Once  sodium chloride, 1,000 mL, intravenous, Once  [3] [4]

## 2025-06-10 NOTE — DISCHARGE SUMMARY
DISCHARGE DIAGNOSIS     Sepsis with YOLY and acute metabolic encephalopathy  Acute cystitis  Acute multifocal CAP, suspect aspiration given hx  Mechanical fall  Generalized weakness    HOSPITAL COURSE AND DETAILS     69M with PMH of pAfib on Eliquis, AUD in remission, HFpEF, CAD s/p CABG, dysphagia with hx of aspiration PNA who presented with fall. Found to have sepsis d/t UTI and PNA. He was being treated with IVF and IV abx, and admitted to the hospital. While awaiting a bed in the ED, pt and spouse decided to leave AMA due to the time needed to get a hospital bed. [facility]     Pt was unable to demonstrate decision making capacity. Discussed with proxy decision maker in his spouse. Discussed risks including further deterioration of sepsis, shock, multiorgan dysfunction, death. Spouse verbalized understanding, communicated a choice, appreciate the consequences, and provided rationale. Pt was discharged AMA on 6/10/25.     DISCHARGE PHYSICAL EXAM     Last Recorded Vitals:  Vitals:    06/10/25 1105 06/10/25 1130 06/10/25 1200 06/10/25 1230   BP: 86/64 97/76 121/82 117/80   BP Location:  Left arm     Patient Position:  Sitting     Pulse: 92 90 92 86   Resp: (!) 24 (!) 23 20 20   Temp:  36.3 °C (97.3 °F)     TempSrc:  Temporal     SpO2: 94% 97% 96% 97%   Weight:       Height:           Physical Exam:  GEN: ill appearing, appears stated age, NAD, disheveled  CV: rrr, no m/r/g, no LE edema  LUNGS: scattered crackles throughout b/l, no acute resp distress, on room air  ABD: soft, obese  NEURO: A+Ox3, no FND      PERTINENT LABS AND IMAGING     Results for orders placed or performed during the hospital encounter of 06/10/25 (from the past 96 hours)   CBC and Auto Differential   Result Value Ref Range    WBC 14.7 (H) 4.4 - 11.3 x10*3/uL    nRBC 0.0 0.0 - 0.0 /100 WBCs    RBC 4.52 4.50 - 5.90 x10*6/uL    Hemoglobin 13.1 (L) 13.5 - 17.5 g/dL    Hematocrit 41.6 41.0 - 52.0 %    MCV 92 80 - 100 fL     MCH 29.0 26.0 - 34.0 pg    MCHC 31.5 (L) 32.0 - 36.0 g/dL    RDW 16.7 (H) 11.5 - 14.5 %    Platelets 378 150 - 450 x10*3/uL    Neutrophils % 86.2 40.0 - 80.0 %    Immature Granulocytes %, Automated 0.6 0.0 - 0.9 %    Lymphocytes % 5.0 13.0 - 44.0 %    Monocytes % 6.6 2.0 - 10.0 %    Eosinophils % 1.4 0.0 - 6.0 %    Basophils % 0.2 0.0 - 2.0 %    Neutrophils Absolute 12.64 (H) 1.60 - 5.50 x10*3/uL    Immature Granulocytes Absolute, Automated 0.09 0.00 - 0.50 x10*3/uL    Lymphocytes Absolute 0.73 (L) 0.80 - 3.00 x10*3/uL    Monocytes Absolute 0.97 (H) 0.05 - 0.80 x10*3/uL    Eosinophils Absolute 0.20 0.00 - 0.40 x10*3/uL    Basophils Absolute 0.03 0.00 - 0.10 x10*3/uL   Comprehensive metabolic panel   Result Value Ref Range    Glucose 160 (H) 74 - 99 mg/dL    Sodium 135 (L) 136 - 145 mmol/L    Potassium 3.5 3.5 - 5.3 mmol/L    Chloride 98 98 - 107 mmol/L    Bicarbonate 28 21 - 32 mmol/L    Anion Gap 13 10 - 20 mmol/L    Urea Nitrogen 25 (H) 6 - 23 mg/dL    Creatinine 1.93 (H) 0.50 - 1.30 mg/dL    eGFR 37 (L) >60 mL/min/1.73m*2    Calcium 10.0 8.6 - 10.3 mg/dL    Albumin 4.2 3.4 - 5.0 g/dL    Alkaline Phosphatase 76 33 - 136 U/L    Total Protein 8.9 (H) 6.4 - 8.2 g/dL    AST 19 9 - 39 U/L    Bilirubin, Total 0.5 0.0 - 1.2 mg/dL    ALT 9 (L) 10 - 52 U/L   Protime-INR   Result Value Ref Range    Protime 21.1 (H) 9.8 - 12.4 seconds    INR 1.9 (H) 0.9 - 1.1   Troponin I, High Sensitivity   Result Value Ref Range    Troponin I, High Sensitivity 15 0 - 20 ng/L   B-type natriuretic peptide   Result Value Ref Range     (H) 0 - 99 pg/mL   Creatine Kinase   Result Value Ref Range    Creatine Kinase 51 0 - 325 U/L   Urinalysis with Reflex Culture and Microscopic   Result Value Ref Range    Color, Urine Light-Orange (N) Light-Yellow, Yellow, Dark-Yellow    Appearance, Urine Ex.Turbid (N) Clear    Specific Gravity, Urine 1.017 1.005 - 1.035    pH, Urine 7.5 5.0, 5.5, 6.0, 6.5, 7.0, 7.5, 8.0    Protein, Urine 70 (1+) (A)  NEGATIVE, 10 (TRACE), 20 (TRACE) mg/dL    Glucose, Urine 30 (TRACE) (A) Normal mg/dL    Blood, Urine 0.2 (2+) (A) NEGATIVE mg/dL    Ketones, Urine NEGATIVE NEGATIVE mg/dL    Bilirubin, Urine NEGATIVE NEGATIVE mg/dL    Urobilinogen, Urine Normal Normal mg/dL    Nitrite, Urine NEGATIVE NEGATIVE    Leukocyte Esterase, Urine 500 Walter/uL (A) NEGATIVE   Microscopic Only, Urine   Result Value Ref Range    WBC, Urine >50 (A) 1-5, NONE /HPF    WBC Clumps, Urine FEW Reference range not established. /HPF    RBC, Urine >20 (A) NONE, 1-2, 3-5 /HPF    Bacteria, Urine 1+ (A) NONE SEEN /HPF    Budding Yeast, Urine PRESENT (A) NONE /HPF   ECG 12 lead   Result Value Ref Range    Ventricular Rate 122 BPM    Atrial Rate 122 BPM    QRS Duration 88 ms    QT Interval 292 ms    QTC Calculation(Bazett) 416 ms    R Axis 81 degrees    T Axis 123 degrees    QRS Count 21 beats    Q Onset 205 ms    T Offset 351 ms    QTC Fredericia 369 ms   Lactate   Result Value Ref Range    Lactate 2.0 0.4 - 2.0 mmol/L        CT lumbar spine retrospective reconstruction protocol   Final Result        No acute intracranial hemorrhage, mass effect or midline shift.        Nonspecific scattered white matter hypodensities favored to represent   sequela of small vessel ischemia. Cervical spondylosis without acute   loss of vertebral body height or traumatic malalignment.        Patchy ground-glass opacities scattered throughout the lungs   concerning for an infectious or inflammatory process. Right upper   lobe opacities are a bit more confluent in appearance and more   pronounced along the anterior/periphery of the lung for which a   component of pulmonary contusion in the setting of trauma is not   excluded.        No acute fracture or traumatic malalignment of the thoracic spine.        No acute traumatic injury throughout the abdomen or pelvis.        Cholelithiasis. No CT evidence for acute cholecystitis.        Colonic diverticulosis without CT evidence of  acute diverticulitis.        Concentric bladder wall thickening with adjacent stranding along with   prominence of the distal ureters bilaterally. Findings are concerning   for cystitis and developing ascending urinary tract infection.   Correlate with urinalysis.        Focal ectasia of the infrarenal abdominal aorta measuring up to 2.5   cm in maximum AP dimension        Lumbar spondylosis without acute fracture or traumatic malalignment        MACRO:   None.        Signed by: Evan Finkelstein 6/10/2025 6:03 AM   Dictation workstation:   [id number]      CT thoracic spine retrospective reconstruction protocol   Final Result        No acute intracranial hemorrhage, mass effect or midline shift.        Nonspecific scattered white matter hypodensities favored to represent   sequela of small vessel ischemia. Cervical spondylosis without acute   loss of vertebral body height or traumatic malalignment.        Patchy ground-glass opacities scattered throughout the lungs   concerning for an infectious or inflammatory process. Right upper   lobe opacities are a bit more confluent in appearance and more   pronounced along the anterior/periphery of the lung for which a   component of pulmonary contusion in the setting of trauma is not   excluded.        No acute fracture or traumatic malalignment of the thoracic spine.        No acute traumatic injury throughout the abdomen or pelvis.        Cholelithiasis. No CT evidence for acute cholecystitis.        Colonic diverticulosis without CT evidence of acute diverticulitis.        Concentric bladder wall thickening with adjacent stranding along with   prominence of the distal ureters bilaterally. Findings are concerning   for cystitis and developing ascending urinary tract infection.   Correlate with urinalysis.        Focal ectasia of the infrarenal abdominal aorta measuring up to 2.5   cm in maximum AP dimension        Lumbar spondylosis without acute fracture or traumatic  malalignment        MACRO:   None.        Signed by: Evan Finkelstein 6/10/2025 6:03 AM   Dictation workstation:   [id number]      CT chest abdomen pelvis w IV contrast   Final Result        No acute intracranial hemorrhage, mass effect or midline shift.        Nonspecific scattered white matter hypodensities favored to represent   sequela of small vessel ischemia. Cervical spondylosis without acute   loss of vertebral body height or traumatic malalignment.        Patchy ground-glass opacities scattered throughout the lungs   concerning for an infectious or inflammatory process. Right upper   lobe opacities are a bit more confluent in appearance and more   pronounced along the anterior/periphery of the lung for which a   component of pulmonary contusion in the setting of trauma is not   excluded.        No acute fracture or traumatic malalignment of the thoracic spine.        No acute traumatic injury throughout the abdomen or pelvis.        Cholelithiasis. No CT evidence for acute cholecystitis.        Colonic diverticulosis without CT evidence of acute diverticulitis.        Concentric bladder wall thickening with adjacent stranding along with   prominence of the distal ureters bilaterally. Findings are concerning   for cystitis and developing ascending urinary tract infection.   Correlate with urinalysis.        Focal ectasia of the infrarenal abdominal aorta measuring up to 2.5   cm in maximum AP dimension        Lumbar spondylosis without acute fracture or traumatic malalignment        MACRO:   None.        Signed by: Evan Finkelstein 6/10/2025 6:03 AM   Dictation workstation:   [id number]      CT head wo IV contrast   Final Result        No acute intracranial hemorrhage, mass effect or midline shift.        Nonspecific scattered white matter hypodensities favored to represent   sequela of small vessel ischemia. Cervical spondylosis without acute   loss of vertebral body height or traumatic malalignment.         Patchy ground-glass opacities scattered throughout the lungs   concerning for an infectious or inflammatory process. Right upper   lobe opacities are a bit more confluent in appearance and more   pronounced along the anterior/periphery of the lung for which a   component of pulmonary contusion in the setting of trauma is not   excluded.        No acute fracture or traumatic malalignment of the thoracic spine.        No acute traumatic injury throughout the abdomen or pelvis.        Cholelithiasis. No CT evidence for acute cholecystitis.        Colonic diverticulosis without CT evidence of acute diverticulitis.        Concentric bladder wall thickening with adjacent stranding along with   prominence of the distal ureters bilaterally. Findings are concerning   for cystitis and developing ascending urinary tract infection.   Correlate with urinalysis.        Focal ectasia of the infrarenal abdominal aorta measuring up to 2.5   cm in maximum AP dimension        Lumbar spondylosis without acute fracture or traumatic malalignment        MACRO:   None.        Signed by: Evan Finkelstein 6/10/2025 6:03 AM   Dictation workstation:   [id number]      CT cervical spine wo IV contrast   Final Result        No acute intracranial hemorrhage, mass effect or midline shift.        Nonspecific scattered white matter hypodensities favored to represent   sequela of small vessel ischemia. Cervical spondylosis without acute   loss of vertebral body height or traumatic malalignment.        Patchy ground-glass opacities scattered throughout the lungs   concerning for an infectious or inflammatory process. Right upper   lobe opacities are a bit more confluent in appearance and more   pronounced along the anterior/periphery of the lung for which a   component of pulmonary contusion in the setting of trauma is not   excluded.        No acute fracture or traumatic malalignment of the thoracic spine.        No acute traumatic injury  throughout the abdomen or pelvis.        Cholelithiasis. No CT evidence for acute cholecystitis.        Colonic diverticulosis without CT evidence of acute diverticulitis.        Concentric bladder wall thickening with adjacent stranding along with   prominence of the distal ureters bilaterally. Findings are concerning   for cystitis and developing ascending urinary tract infection.   Correlate with urinalysis.        Focal ectasia of the infrarenal abdominal aorta measuring up to 2.5   cm in maximum AP dimension        Lumbar spondylosis without acute fracture or traumatic malalignment        MACRO:   None.        Signed by: Evan Finkelstein 6/10/2025 6:03 AM   Dictation workstation:   [id number]          No echocardiogram results found for the past 14 days    DISCHARGE MEDICATIONS        Your medication list        ASK your doctor about these medications        Instructions Last Dose Given Next Dose Due   apixaban 5 mg tablet  Commonly known as: Eliquis           aspirin 81 mg EC tablet           cilostazol 100 mg tablet  Commonly known as: Pletal           cyclobenzaprine 10 mg tablet  Commonly known as: Flexeril           dabigatran etexilate 150 mg capsule  Commonly known as: Pradaxa           finasteride 5 mg tablet  Commonly known as: Proscar           furosemide 40 mg tablet  Commonly known as: Lasix           gabapentin 400 mg capsule  Commonly known as: Neurontin           metoprolol tartrate 25 mg tablet  Commonly known as: Lopressor           metoprolol tartrate 75 mg tablet  Commonly known as: Lopressor      Take 1 tablet (75 mg) by mouth 2 times a day.       multivitamin with minerals tablet      Take 1 tablet by mouth once daily.       Protonix 40 mg EC tablet  Generic drug: pantoprazole           rosuvastatin 20 mg tablet  Commonly known as: Crestor           thiamine 100 mg tablet  Commonly known as: Vitamin B-1      Take 1 tablet (100 mg) by mouth once daily.                OUTPATIENT FOLLOW-UP      No future appointments.

## 2025-06-10 NOTE — ED PROVIDER NOTES
HPI   Chief Complaint   Patient presents with    Fall       This is a 69-year-old male with a past medical history of atrial fibrillation with RVR, dysphagia, dementia, pneumonia, heart failure sent in from home by EMS after wife discovered he had fallen.  The wife states that she did not see him fall but heard him fall.  He is on blood thinners.  The patient has dementia and is a very poor historian.  He is unable to say exactly what made him fall.  He does not believe he passed out.  He denies any headache, chest pain, palpitations, cough or shortness of breath.  He does report generalized weakness.      History provided by:  Patient, EMS personnel, medical records and spouse  History limited by:  Dementia          Patient History   Medical History[1]  Surgical History[2]  Family History[3]  Social History[4]    Physical Exam   ED Triage Vitals   Temp Heart Rate Respirations BP   -- 06/10/25 0444 06/10/25 0444 06/10/25 0440    (!) 110 19 111/67      Pulse Ox Temp src Heart Rate Source Patient Position   06/10/25 0440 -- -- --   94 %         BP Location FiO2 (%)     -- --             Physical Exam  Vitals and nursing note reviewed.   Constitutional:       General: He is not in acute distress.     Appearance: Normal appearance. He is underweight. He is ill-appearing. He is not toxic-appearing or diaphoretic.   HENT:      Head: Normocephalic and atraumatic.      Jaw: There is normal jaw occlusion.      Right Ear: Hearing, tympanic membrane, ear canal and external ear normal.      Left Ear: Hearing, tympanic membrane, ear canal and external ear normal.      Nose: Nose normal.      Mouth/Throat:      Lips: Pink.      Mouth: Mucous membranes are dry.      Pharynx: Oropharynx is clear.   Eyes:      Extraocular Movements: Extraocular movements intact.      Conjunctiva/sclera: Conjunctivae normal.      Pupils: Pupils are equal, round, and reactive to light.   Cardiovascular:      Rate and Rhythm: Regular rhythm.  Tachycardia present.      Pulses: Normal pulses.      Heart sounds: Normal heart sounds.   Pulmonary:      Effort: Pulmonary effort is normal.      Breath sounds: Normal breath sounds. No wheezing, rhonchi or rales.   Abdominal:      General: Abdomen is flat. Bowel sounds are normal.      Palpations: Abdomen is soft. There is no mass.      Tenderness: There is no abdominal tenderness. There is no guarding.   Musculoskeletal:         General: No swelling, tenderness or signs of injury. Normal range of motion.      Cervical back: Normal range of motion and neck supple.   Skin:     General: Skin is warm and dry.      Capillary Refill: Capillary refill takes less than 2 seconds.      Findings: No rash.   Neurological:      General: No focal deficit present.      Mental Status: He is alert. Mental status is at baseline. He is disoriented.      GCS: GCS eye subscore is 4. GCS verbal subscore is 5. GCS motor subscore is 6.      Comments: The wife states he is at his mental baseline   Psychiatric:         Attention and Perception: He is inattentive.         Mood and Affect: Mood normal. Affect is flat.         Speech: Speech normal.         Behavior: Behavior normal. Behavior is cooperative.         Thought Content: Thought content normal.         Cognition and Memory: Cognition is impaired. Memory is impaired. He exhibits impaired recent memory and impaired remote memory.         Judgment: Judgment normal.           ED Course & MDM   Diagnoses as of 06/10/25 0533   Fall, initial encounter   Closed head injury, initial encounter   Acute renal injury   Generalized weakness   Dementia, unspecified dementia severity, unspecified dementia type, unspecified whether behavioral, psychotic, or mood disturbance or anxiety (Multi)                 No data recorded                                 Medical Decision Making  This is a 69-year-old male with a past medical history of atrial fibrillation with RVR, dysphagia, dementia,  pneumonia, heart failure sent in from home by EMS after wife discovered he had fallen.  The wife states that she did not see him fall but heard him fall.  He is on blood thinners.  The patient has dementia and is a very poor historian.  He is unable to say exactly what made him fall.  He does not believe he passed out.  He denies any headache, chest pain, palpitations, cough or shortness of breath.  He does report generalized weakness.  /67 heart rate 110 respirations 19 pulse ox is 94% on room air  Patient was given a liter normal saline wide open, Zyprexa 2.5 mg IM.  CBC shows a white count of 14.7, hemoglobin 13.1 hematocrit 41.6 platelet count was 378 metabolic glucose 160 sodium 135 BUN 25 creatinine 1.93 with a GFR of 37.  Acute renal failure, 3 months ago his GFR was 51 with a creatinine of 1.46.  PT 21.1 INR 1.9 troponin is 15.  0065 hrs. patient's care turned over to Jose Leung PA-C pending results of the workup.        Procedure  Procedures       [1] No past medical history on file.  [2] No past surgical history on file.  [3] No family history on file.  [4]   Social History  Tobacco Use    Smoking status: Unknown    Smokeless tobacco: Not on file   Substance Use Topics    Alcohol use: Yes     Alcohol/week: 12.0 standard drinks of alcohol     Types: 12 Cans of beer per week    Drug use: Not Currently        Bennett Carrasco PA-C  06/10/25 0549

## 2025-06-11 LAB
BACTERIA UR CULT: ABNORMAL
LEGIONELLA AG UR QL: NEGATIVE
S PNEUM AG UR QL: NEGATIVE

## 2025-06-14 LAB
BACTERIA BLD CULT: NORMAL
BACTERIA BLD CULT: NORMAL

## 2025-07-05 LAB
ATRIAL RATE: 122 BPM
Q ONSET: 205 MS
QRS COUNT: 21 BEATS
QRS DURATION: 88 MS
QT INTERVAL: 292 MS
QTC CALCULATION(BAZETT): 416 MS
QTC FREDERICIA: 369 MS
R AXIS: 81 DEGREES
T AXIS: 123 DEGREES
T OFFSET: 351 MS
VENTRICULAR RATE: 122 BPM